# Patient Record
Sex: FEMALE | Race: ASIAN | NOT HISPANIC OR LATINO | Employment: FULL TIME | URBAN - METROPOLITAN AREA
[De-identification: names, ages, dates, MRNs, and addresses within clinical notes are randomized per-mention and may not be internally consistent; named-entity substitution may affect disease eponyms.]

---

## 2020-06-25 ENCOUNTER — OFFICE VISIT (OUTPATIENT)
Dept: OBSTETRICS AND GYNECOLOGY | Facility: CLINIC | Age: 45
End: 2020-06-25
Payer: COMMERCIAL

## 2020-06-25 VITALS
HEIGHT: 61 IN | SYSTOLIC BLOOD PRESSURE: 128 MMHG | WEIGHT: 166 LBS | BODY MASS INDEX: 31.34 KG/M2 | DIASTOLIC BLOOD PRESSURE: 86 MMHG

## 2020-06-25 DIAGNOSIS — Z01.419 ROUTINE GYNECOLOGICAL EXAMINATION: Primary | ICD-10-CM

## 2020-06-25 DIAGNOSIS — E13.9 DIABETES 1.5, MANAGED AS TYPE 2: ICD-10-CM

## 2020-06-25 DIAGNOSIS — I10 HYPERTENSION, UNSPECIFIED TYPE: ICD-10-CM

## 2020-06-25 PROCEDURE — 99386 PREV VISIT NEW AGE 40-64: CPT | Mod: S$GLB,,, | Performed by: OBSTETRICS & GYNECOLOGY

## 2020-06-25 PROCEDURE — 3074F PR MOST RECENT SYSTOLIC BLOOD PRESSURE < 130 MM HG: ICD-10-PCS | Mod: CPTII,S$GLB,, | Performed by: OBSTETRICS & GYNECOLOGY

## 2020-06-25 PROCEDURE — 3074F SYST BP LT 130 MM HG: CPT | Mod: CPTII,S$GLB,, | Performed by: OBSTETRICS & GYNECOLOGY

## 2020-06-25 PROCEDURE — 99386 PR PREVENTIVE VISIT,NEW,40-64: ICD-10-PCS | Mod: S$GLB,,, | Performed by: OBSTETRICS & GYNECOLOGY

## 2020-06-25 PROCEDURE — 3079F PR MOST RECENT DIASTOLIC BLOOD PRESSURE 80-89 MM HG: ICD-10-PCS | Mod: CPTII,S$GLB,, | Performed by: OBSTETRICS & GYNECOLOGY

## 2020-06-25 PROCEDURE — 3079F DIAST BP 80-89 MM HG: CPT | Mod: CPTII,S$GLB,, | Performed by: OBSTETRICS & GYNECOLOGY

## 2020-06-25 RX ORDER — METFORMIN HYDROCHLORIDE 1000 MG/1
TABLET ORAL
COMMUNITY
Start: 2020-06-10

## 2020-06-25 RX ORDER — LOSARTAN POTASSIUM 50 MG/1
TABLET ORAL
COMMUNITY
Start: 2020-06-15

## 2020-06-25 NOTE — PROGRESS NOTES
Subjective:       Patient ID: Reyna Hernandez is a 44 y.o. female.    Chief Complaint:  Well Woman      History of Present Illness  Happy with IUD. My menstruation is light.    Annual Exam-Premenopausal  Patient presents for annual exam. The patient has no complaints today. The patient is sexually active. . The patient wears seatbelts: yes. The patient participates in regular exercise: no. Has the patient ever been transfused or tattooed?: no. The patient reports that there is not domestic violence in her life.    On Losartan, had an episode of hypertensive urgency.      GYN & OB History  Patient's last menstrual period was 2020.   Date of Last Pap: No result found    OB History    Para Term  AB Living   2         2   SAB TAB Ectopic Multiple Live Births                  # Outcome Date GA Lbr Travis/2nd Weight Sex Delivery Anes PTL Lv   2             1                 Review of Systems  Review of Systems   Constitutional: Negative for activity change, appetite change, fatigue, fever and unexpected weight change.   HENT: Negative for nasal congestion and tinnitus.    Eyes: Negative for visual disturbance.   Respiratory: Negative for cough and shortness of breath.    Cardiovascular: Negative for chest pain and leg swelling.   Gastrointestinal: Negative for abdominal pain, bloating, blood in stool, constipation and diarrhea.   Genitourinary: Negative for bladder incontinence, decreased libido, dysmenorrhea, dyspareunia, dysuria, vaginal discharge, vaginal pain, vaginal dryness and vaginal odor.   Musculoskeletal: Negative for arthralgias, back pain and joint swelling.   Integumentary:  Negative for acne.   Neurological: Negative for headaches.   Psychiatric/Behavioral: Negative for depression and sleep disturbance. The patient is not nervous/anxious.            Objective:    Physical Exam:   Constitutional: She is oriented to person, place, and time. She appears well-developed and  well-nourished. She is cooperative.      Neck: No thyroid mass and no thyromegaly present.    Cardiovascular: Normal rate and normal pulses.     Pulmonary/Chest: Effort normal. No respiratory distress. Chest wall is not dull to percussion. She exhibits no mass, no bony tenderness, no laceration, no crepitus, no edema, no deformity, no swelling and no retraction. Right breast exhibits no inverted nipple, no mass, no nipple discharge, no skin change, no tenderness, presence, no bleeding and no swelling. Left breast exhibits no inverted nipple, no mass, no nipple discharge, no skin change, no tenderness, presence, no bleeding and no swelling.        Abdominal: Soft. Normal appearance. She exhibits no distension. There is no abdominal tenderness. No hernia.     Genitourinary:    Vagina, uterus and rectum normal.      Pelvic exam was performed with patient supine.   There is no rash, tenderness, lesion or injury on the right labia. There is no rash, tenderness, lesion or injury on the left labia. Cervix is normal. Right adnexum displays no mass, no tenderness and no fullness. Left adnexum displays no mass, no tenderness and no fullness. No rectocele, cystocele or unspecified prolapse of vaginal walls in the vagina. Labial bartholins normal.          Musculoskeletal: Moves all extremeties.       Neurological: She is alert and oriented to person, place, and time.    Skin: Skin is warm and dry. No rash noted.    Psychiatric: She has a normal mood and affect. Her speech is normal and behavior is normal. Judgment and thought content normal.          Assessment:       Wellness Exam        Plan:    Mammogram  Pap smear  RTC 1 year

## 2020-07-02 ENCOUNTER — TELEPHONE (OUTPATIENT)
Dept: OBSTETRICS AND GYNECOLOGY | Facility: CLINIC | Age: 45
End: 2020-07-02

## 2020-07-02 NOTE — TELEPHONE ENCOUNTER
----- Message from Rosa Taveras NP sent at 7/2/2020 12:48 PM CDT -----  Regarding: Pap results  Please call patient within 24-48 hrs and let them know their pap smear results were normal. Thank you!    -Rosa Taveras NP

## 2020-07-07 ENCOUNTER — TELEPHONE (OUTPATIENT)
Dept: OBSTETRICS AND GYNECOLOGY | Facility: CLINIC | Age: 45
End: 2020-07-07

## 2020-07-07 NOTE — TELEPHONE ENCOUNTER
----- Message from Rosa Taveras NP sent at 7/7/2020  8:44 AM CDT -----  Please call and inform patient recent pap smear testing came back within normal limits.

## 2020-08-13 ENCOUNTER — TELEPHONE (OUTPATIENT)
Dept: OBSTETRICS AND GYNECOLOGY | Facility: CLINIC | Age: 45
End: 2020-08-13

## 2020-08-13 NOTE — TELEPHONE ENCOUNTER
Advised pt mammogram was within normal limits and continue annual screening. Pt verbalized understanding. Bone Density letter was mailed.  AF

## 2020-08-13 NOTE — TELEPHONE ENCOUNTER
----- Message from Rosa Taveras NP sent at 8/13/2020 11:12 AM CDT -----  Please call and inform patient her recent mammogram results were within normal limits and instruct her to continue with annual screening. Be sure breast density letter that was printed is mailed to patient.

## 2021-06-30 ENCOUNTER — OFFICE VISIT (OUTPATIENT)
Dept: OBSTETRICS AND GYNECOLOGY | Facility: CLINIC | Age: 46
End: 2021-06-30
Payer: COMMERCIAL

## 2021-06-30 VITALS
HEIGHT: 61 IN | HEART RATE: 99 BPM | SYSTOLIC BLOOD PRESSURE: 128 MMHG | BODY MASS INDEX: 31.15 KG/M2 | DIASTOLIC BLOOD PRESSURE: 87 MMHG | WEIGHT: 165 LBS

## 2021-06-30 DIAGNOSIS — Z01.419 ROUTINE GYNECOLOGICAL EXAMINATION: Primary | ICD-10-CM

## 2021-06-30 PROCEDURE — 1126F AMNT PAIN NOTED NONE PRSNT: CPT | Mod: S$GLB,,, | Performed by: OBSTETRICS & GYNECOLOGY

## 2021-06-30 PROCEDURE — 99396 PR PREVENTIVE VISIT,EST,40-64: ICD-10-PCS | Mod: S$GLB,,, | Performed by: OBSTETRICS & GYNECOLOGY

## 2021-06-30 PROCEDURE — 3008F PR BODY MASS INDEX (BMI) DOCUMENTED: ICD-10-PCS | Mod: CPTII,S$GLB,, | Performed by: OBSTETRICS & GYNECOLOGY

## 2021-06-30 PROCEDURE — 99396 PREV VISIT EST AGE 40-64: CPT | Mod: S$GLB,,, | Performed by: OBSTETRICS & GYNECOLOGY

## 2021-06-30 PROCEDURE — 3008F BODY MASS INDEX DOCD: CPT | Mod: CPTII,S$GLB,, | Performed by: OBSTETRICS & GYNECOLOGY

## 2021-06-30 PROCEDURE — 1126F PR PAIN SEVERITY QUANTIFIED, NO PAIN PRESENT: ICD-10-PCS | Mod: S$GLB,,, | Performed by: OBSTETRICS & GYNECOLOGY

## 2022-07-05 ENCOUNTER — OFFICE VISIT (OUTPATIENT)
Dept: OBSTETRICS AND GYNECOLOGY | Facility: CLINIC | Age: 47
End: 2022-07-05
Payer: COMMERCIAL

## 2022-07-05 VITALS
SYSTOLIC BLOOD PRESSURE: 134 MMHG | HEIGHT: 61 IN | WEIGHT: 152.69 LBS | DIASTOLIC BLOOD PRESSURE: 83 MMHG | HEART RATE: 83 BPM | BODY MASS INDEX: 28.83 KG/M2

## 2022-07-05 DIAGNOSIS — Z12.31 SCREENING MAMMOGRAM FOR HIGH-RISK PATIENT: Primary | ICD-10-CM

## 2022-07-05 DIAGNOSIS — Z01.419 ROUTINE GYNECOLOGICAL EXAMINATION: ICD-10-CM

## 2022-07-05 PROCEDURE — 99396 PR PREVENTIVE VISIT,EST,40-64: ICD-10-PCS | Mod: S$GLB,,, | Performed by: OBSTETRICS & GYNECOLOGY

## 2022-07-05 PROCEDURE — 4010F ACE/ARB THERAPY RXD/TAKEN: CPT | Mod: CPTII,S$GLB,, | Performed by: OBSTETRICS & GYNECOLOGY

## 2022-07-05 PROCEDURE — 99396 PREV VISIT EST AGE 40-64: CPT | Mod: S$GLB,,, | Performed by: OBSTETRICS & GYNECOLOGY

## 2022-07-05 PROCEDURE — 4010F PR ACE/ARB THEARPY RXD/TAKEN: ICD-10-PCS | Mod: CPTII,S$GLB,, | Performed by: OBSTETRICS & GYNECOLOGY

## 2022-07-05 RX ORDER — ATORVASTATIN CALCIUM 10 MG/1
10 TABLET, FILM COATED ORAL DAILY
COMMUNITY
Start: 2022-05-27

## 2022-07-05 RX ORDER — SEMAGLUTIDE 1.34 MG/ML
INJECTION, SOLUTION SUBCUTANEOUS
COMMUNITY
Start: 2022-06-19

## 2022-07-05 NOTE — PROGRESS NOTES
Subjective:       Patient ID: Reyna Hernandez is a 46 y.o. female.    Chief Complaint:  Well Woman      History of Present Illness  HPI  Annual Exam-Premenopausal  Patient presents for annual exam. The patient has no complaints today.    GYN & OB History  No LMP recorded. (Menstrual status: Birth Control).   Date of Last Pap: No result found    OB History    Para Term  AB Living   2 2       2   SAB IAB Ectopic Multiple Live Births                  # Outcome Date GA Lbr Travis/2nd Weight Sex Delivery Anes PTL Lv   2 Para            1 Para                Review of Systems  Review of Systems   Constitutional: Negative for activity change, appetite change, fatigue, fever and unexpected weight change.   HENT: Negative for nasal congestion and tinnitus.    Eyes: Negative for visual disturbance.   Respiratory: Negative for cough and shortness of breath.    Cardiovascular: Negative for chest pain and leg swelling.   Gastrointestinal: Negative for abdominal pain, bloating, blood in stool, constipation and diarrhea.   Genitourinary: Negative for bladder incontinence, dysuria, vaginal bleeding, vaginal discharge, vaginal pain, vaginal dryness and vaginal odor.   Musculoskeletal: Negative for arthralgias, back pain and joint swelling.   Integumentary:  Negative for acne.   Neurological: Negative for headaches.   Psychiatric/Behavioral: Negative for depression and sleep disturbance. The patient is not nervous/anxious.            Objective:    Physical Exam:   Constitutional: She is oriented to person, place, and time. Vital signs are normal. She appears well-developed and well-nourished. She is cooperative.      Neck: No thyroid mass and no thyromegaly present.    Cardiovascular: Normal rate, regular rhythm and normal pulses.     Pulmonary/Chest: Effort normal. No respiratory distress. Chest wall is not dull to percussion. She exhibits no mass, no bony tenderness, no laceration, no crepitus, no edema, no deformity, no  swelling and no retraction. Right breast exhibits no inverted nipple, no mass, no nipple discharge, no skin change, no tenderness, presence, no bleeding and no swelling. Left breast exhibits no inverted nipple, no mass, no nipple discharge, no skin change, no tenderness, presence, no bleeding and no swelling.        Abdominal: Soft. She exhibits no distension. There is no abdominal tenderness. No hernia.     Genitourinary:    Vagina, uterus and rectum normal.      Pelvic exam was performed with patient supine.   Labial bartholins normal.There is no rash, tenderness, lesion or injury on the right labia. There is no rash, tenderness, lesion or injury on the left labia. Cervix is normal. Right adnexum displays no mass, no tenderness and no fullness. Left adnexum displays no mass, no tenderness and no fullness. No  no vaginal discharge, rectocele, cystocele or unspecified prolapse of vaginal walls in the vagina.           Musculoskeletal: Moves all extremeties.       Neurological: She is alert and oriented to person, place, and time.    Skin: Skin is warm and dry. No rash noted.    Psychiatric: She has a normal mood and affect. Her speech is normal and behavior is normal. Judgment and thought content normal.          Assessment:        1. Screening mammogram for high-risk patient    Well Woman Exam         Plan:      Routine care  Patient starting Ozempic for diabetes.

## 2022-08-17 ENCOUNTER — TELEPHONE (OUTPATIENT)
Dept: OBSTETRICS AND GYNECOLOGY | Facility: CLINIC | Age: 47
End: 2022-08-17
Payer: COMMERCIAL

## 2022-08-17 ENCOUNTER — PATIENT MESSAGE (OUTPATIENT)
Dept: OBSTETRICS AND GYNECOLOGY | Facility: CLINIC | Age: 47
End: 2022-08-17
Payer: COMMERCIAL

## 2022-08-17 DIAGNOSIS — N63.0 BREAST LUMP: Primary | ICD-10-CM

## 2022-08-17 NOTE — TELEPHONE ENCOUNTER
Called to inform patient that additional views are being requested by the radiologist, and advised patient that the provider has sent the order over. Patient understood         kt

## 2022-08-17 NOTE — TELEPHONE ENCOUNTER
----- Message from Shira Pollock MD sent at 8/17/2022  2:09 PM CDT -----  Please notify patient of the need for additional imaging in order as well.

## 2022-08-24 ENCOUNTER — TELEPHONE (OUTPATIENT)
Dept: OBSTETRICS AND GYNECOLOGY | Facility: CLINIC | Age: 47
End: 2022-08-24
Payer: COMMERCIAL

## 2022-08-24 DIAGNOSIS — N63.0 BREAST LUMP: Primary | ICD-10-CM

## 2022-08-29 ENCOUNTER — OFFICE VISIT (OUTPATIENT)
Dept: SURGERY | Facility: CLINIC | Age: 47
End: 2022-08-29
Payer: COMMERCIAL

## 2022-08-29 DIAGNOSIS — N63.20 MASS OF BREAST, LEFT: Primary | ICD-10-CM

## 2022-08-29 DIAGNOSIS — N63.0 BREAST LUMP: ICD-10-CM

## 2022-08-29 PROCEDURE — 1159F PR MEDICATION LIST DOCUMENTED IN MEDICAL RECORD: ICD-10-PCS | Mod: CPTII,S$GLB,, | Performed by: SURGERY

## 2022-08-29 PROCEDURE — 1160F PR REVIEW ALL MEDS BY PRESCRIBER/CLIN PHARMACIST DOCUMENTED: ICD-10-PCS | Mod: CPTII,S$GLB,, | Performed by: SURGERY

## 2022-08-29 PROCEDURE — 99203 PR OFFICE/OUTPT VISIT, NEW, LEVL III, 30-44 MIN: ICD-10-PCS | Mod: S$GLB,,, | Performed by: SURGERY

## 2022-08-29 PROCEDURE — 4010F PR ACE/ARB THEARPY RXD/TAKEN: ICD-10-PCS | Mod: CPTII,S$GLB,, | Performed by: SURGERY

## 2022-08-29 PROCEDURE — 1159F MED LIST DOCD IN RCRD: CPT | Mod: CPTII,S$GLB,, | Performed by: SURGERY

## 2022-08-29 PROCEDURE — 4010F ACE/ARB THERAPY RXD/TAKEN: CPT | Mod: CPTII,S$GLB,, | Performed by: SURGERY

## 2022-08-29 PROCEDURE — 99203 OFFICE O/P NEW LOW 30 MIN: CPT | Mod: S$GLB,,, | Performed by: SURGERY

## 2022-08-29 PROCEDURE — 1160F RVW MEDS BY RX/DR IN RCRD: CPT | Mod: CPTII,S$GLB,, | Performed by: SURGERY

## 2022-08-29 NOTE — PROGRESS NOTES
History & Physical    SUBJECTIVE:     History of Present Illness:    46-year-old female referred by Dr. Shira Pollock for an abnormal screening mammogram and ultrasound.  Patient noted to have 2 lesions in the left breast, the 1st 9 mm left upper outer quadrant with irregular margins at 3:00 a.m..  Ultrasound also identified a 10 mm lesion at the 11 o'clock position.  This was classified as BI-RADS category 4 suspicious findings.  Patient does not take hormonal therapy.  No family history of breast cancer.  Patient relates no suspicious masses or changes in either breast    Chief Complaint   Patient presents with    Breast Problem     Left breast          Review of patient's allergies indicates:  Review of patient's allergies indicates:  No Known Allergies    Current Outpatient Medications on File Prior to Visit   Medication Sig Dispense Refill    atorvastatin (LIPITOR) 10 MG tablet Take 10 mg by mouth once daily.      losartan (COZAAR) 50 MG tablet       metFORMIN (GLUCOPHAGE) 1000 MG tablet       OZEMPIC 0.25 mg or 0.5 mg(2 mg/1.5 mL) pen injector INJECT 0.5 MG UNDER THE SKIN ONCE A WEEK       No current facility-administered medications on file prior to visit.       Past Medical History:   Diagnosis Date    Depression     Diabetes mellitus     DM (diabetes mellitus), type 2     Elderly multigravida     Hypertension      Past Surgical History:   Procedure Laterality Date    LEFT OOPHORECTOMY Left 2012    left ovary removed     Family History   Problem Relation Age of Onset    Hypertension Mother     Diabetes Mother     Hyperlipidemia Mother     Hyperthyroidism Mother     Stroke Maternal Uncle     No Known Problems Father     No Known Problems Sister     No Known Problems Brother     No Known Problems Maternal Grandmother     No Known Problems Maternal Grandfather     No Known Problems Paternal Grandmother     No Known Problems Paternal Grandfather        Social History     Socioeconomic History    Marital status:     Tobacco Use    Smoking status: Never    Smokeless tobacco: Never   Substance and Sexual Activity    Alcohol use: Not Currently    Drug use: Never    Sexual activity: Yes     Partners: Male     Birth control/protection: None     Comment: history of IUD          Review of Systems   Constitutional: Negative.    Respiratory: Negative.     Cardiovascular: Negative.    Gastrointestinal: Negative.    Genitourinary: Negative.    Musculoskeletal: Negative.    Endo/Heme/Allergies: Negative.    Psychiatric/Behavioral: Negative.       OBJECTIVE:     There were no vitals filed for this visit.              Physical Exam:  Physical Exam  Constitutional:       Appearance: She is normal weight.   HENT:      Head: Normocephalic.   Cardiovascular:      Rate and Rhythm: Regular rhythm.      Heart sounds: Normal heart sounds.   Pulmonary:      Effort: Pulmonary effort is normal.   Chest:      Comments: Bilateral breast examination performed.  In the right breast there are no palpable masses or noted skin changes.  Nipple areola complex normal.  Axilla negative.  Left breast shows no abnormal masses palpable.  Skin of the breast is normal.  Nipple areola complex normal.  Axilla negative  Abdominal:      General: Abdomen is flat. Bowel sounds are normal.      Palpations: Abdomen is soft.   Musculoskeletal:         General: Normal range of motion.      Cervical back: Normal range of motion.   Skin:     General: Skin is warm and dry.      Coloration: Skin is not jaundiced.   Neurological:      General: No focal deficit present.      Mental Status: She is alert and oriented to person, place, and time.   Psychiatric:         Mood and Affect: Mood normal.         Behavior: Behavior normal.           ASSESSMENT/PLAN:   BI-RADS category 4 suspicious findings left breast x2.  9 mm 3 o'clock position and 10 mm 11 o'clock position  PLAN:  Recommend ultrasound-guided biopsies of both the left breast lesions noted on the ultrasound.   Further treatment pending pathological diagnosis.

## 2022-09-19 DIAGNOSIS — C50.912 MALIGNANT NEOPLASM OF LEFT FEMALE BREAST, UNSPECIFIED ESTROGEN RECEPTOR STATUS, UNSPECIFIED SITE OF BREAST: Primary | ICD-10-CM

## 2022-09-20 ENCOUNTER — TELEPHONE (OUTPATIENT)
Dept: OBSTETRICS AND GYNECOLOGY | Facility: CLINIC | Age: 47
End: 2022-09-20
Payer: COMMERCIAL

## 2022-09-20 LAB — SPECIMEN TO PATHOLOGY: NORMAL

## 2022-09-20 NOTE — TELEPHONE ENCOUNTER
----- Message from Mira Herron sent at 9/20/2022  8:12 AM CDT -----  Contact: Patient  Patient need the nurse to call her regarding her procedure       #  [phone]

## 2022-09-20 NOTE — TELEPHONE ENCOUNTER
Patient's call was returned, she is inquiring about the status of the referral to Dr. Huerta, gave the patient their office contact information to speak with them regarding status.         Xochitl

## 2022-09-21 ENCOUNTER — TELEPHONE (OUTPATIENT)
Dept: OBSTETRICS AND GYNECOLOGY | Facility: CLINIC | Age: 47
End: 2022-09-21
Payer: COMMERCIAL

## 2022-09-21 NOTE — TELEPHONE ENCOUNTER
Patient with concern regards to follow-up with General surgery.  Feels that she be sooner.  Discussed with patient the next Monday should be okay.  Case discussed with Dr. Huerta and he will advise her at that time.

## 2022-09-23 LAB — SPECIMEN TO PATHOLOGY: NORMAL

## 2022-09-26 ENCOUNTER — OFFICE VISIT (OUTPATIENT)
Dept: SURGERY | Facility: CLINIC | Age: 47
End: 2022-09-26
Payer: COMMERCIAL

## 2022-09-26 DIAGNOSIS — C50.912 MALIGNANT NEOPLASM OF LEFT FEMALE BREAST, UNSPECIFIED ESTROGEN RECEPTOR STATUS, UNSPECIFIED SITE OF BREAST: Primary | ICD-10-CM

## 2022-09-26 LAB
ANION GAP SERPL CALC-SCNC: 10 MMOL/L (ref 3–11)
BASOPHILS NFR BLD: 0.3 % (ref 0–3)
BUN SERPL-MCNC: 8 MG/DL (ref 7–18)
BUN/CREAT SERPL: 12.69 RATIO (ref 7–18)
CALCIUM SERPL-MCNC: 9.1 MG/DL (ref 8.8–10.5)
CHLORIDE SERPL-SCNC: 105 MMOL/L (ref 100–108)
CO2 SERPL-SCNC: 25 MMOL/L (ref 21–32)
CREAT SERPL-MCNC: 0.63 MG/DL (ref 0.55–1.02)
EOSINOPHIL NFR BLD: 0.9 % (ref 1–3)
ERYTHROCYTE [DISTWIDTH] IN BLOOD BY AUTOMATED COUNT: 13.1 % (ref 12.5–18)
GFR ESTIMATION: > 60
GLUCOSE SERPL-MCNC: 91 MG/DL (ref 70–110)
HCG QUALITATIVE: NEGATIVE
HCT VFR BLD AUTO: 39.6 % (ref 37–47)
HGB BLD-MCNC: 13.7 G/DL (ref 12–16)
LYMPHOCYTES NFR BLD: 25.5 % (ref 25–40)
MCH RBC QN AUTO: 29.2 PG (ref 27–31.2)
MCHC RBC AUTO-ENTMCNC: 34.6 G/DL (ref 31.8–35.4)
MCV RBC AUTO: 84.4 FL (ref 80–97)
MONOCYTES NFR BLD: 7 % (ref 1–15)
NEUTROPHILS # BLD AUTO: 6.15 10*3/UL (ref 1.8–7.7)
NEUTROPHILS NFR BLD: 65.9 % (ref 37–80)
NUCLEATED RED BLOOD CELLS: 0 %
PLATELETS: 357 10*3/UL (ref 142–424)
POTASSIUM SERPL-SCNC: 3.9 MMOL/L (ref 3.6–5.2)
RBC # BLD AUTO: 4.69 10*6/UL (ref 4.2–5.4)
SODIUM BLD-SCNC: 140 MMOL/L (ref 135–145)
WBC # BLD: 9.3 10*3/UL (ref 4.6–10.2)

## 2022-09-26 PROCEDURE — 1160F RVW MEDS BY RX/DR IN RCRD: CPT | Mod: CPTII,S$GLB,, | Performed by: SURGERY

## 2022-09-26 PROCEDURE — 4010F PR ACE/ARB THEARPY RXD/TAKEN: ICD-10-PCS | Mod: CPTII,S$GLB,, | Performed by: SURGERY

## 2022-09-26 PROCEDURE — 1160F PR REVIEW ALL MEDS BY PRESCRIBER/CLIN PHARMACIST DOCUMENTED: ICD-10-PCS | Mod: CPTII,S$GLB,, | Performed by: SURGERY

## 2022-09-26 PROCEDURE — 99213 OFFICE O/P EST LOW 20 MIN: CPT | Mod: S$GLB,,, | Performed by: SURGERY

## 2022-09-26 PROCEDURE — 1159F PR MEDICATION LIST DOCUMENTED IN MEDICAL RECORD: ICD-10-PCS | Mod: CPTII,S$GLB,, | Performed by: SURGERY

## 2022-09-26 PROCEDURE — 4010F ACE/ARB THERAPY RXD/TAKEN: CPT | Mod: CPTII,S$GLB,, | Performed by: SURGERY

## 2022-09-26 PROCEDURE — 99213 PR OFFICE/OUTPT VISIT, EST, LEVL III, 20-29 MIN: ICD-10-PCS | Mod: S$GLB,,, | Performed by: SURGERY

## 2022-09-26 PROCEDURE — 1159F MED LIST DOCD IN RCRD: CPT | Mod: CPTII,S$GLB,, | Performed by: SURGERY

## 2022-09-26 NOTE — PROGRESS NOTES
History & Physical    SUBJECTIVE:     History of Present Illness:    [age] year old female returns today after recent ultrasound-guided left breast biopsy of a suspicious lesion 11:00 a.m. left breast showed to be invasive ductal carcinoma, ERPR negative, HER2 negative.  The lesion size was 10 mm on previous ultrasound.  The left axilla was negative on ultrasound.  She is here today to discuss therapy.    Chief Complaint   Patient presents with    Breast Problem     Left breast         Review of patient's allergies indicates:  Review of patient's allergies indicates:  No Known Allergies    Current Outpatient Medications on File Prior to Visit   Medication Sig Dispense Refill    atorvastatin (LIPITOR) 10 MG tablet Take 10 mg by mouth once daily.      losartan (COZAAR) 50 MG tablet       metFORMIN (GLUCOPHAGE) 1000 MG tablet       OZEMPIC 0.25 mg or 0.5 mg(2 mg/1.5 mL) pen injector INJECT 0.5 MG UNDER THE SKIN ONCE A WEEK       No current facility-administered medications on file prior to visit.       Past Medical History:   Diagnosis Date    Depression     Diabetes mellitus     DM (diabetes mellitus), type 2     Elderly multigravida     Hypertension      Past Surgical History:   Procedure Laterality Date    LEFT OOPHORECTOMY Left 2012    left ovary removed     Family History   Problem Relation Age of Onset    Hypertension Mother     Diabetes Mother     Hyperlipidemia Mother     Hyperthyroidism Mother     Stroke Maternal Uncle     No Known Problems Father     No Known Problems Sister     No Known Problems Brother     No Known Problems Maternal Grandmother     No Known Problems Maternal Grandfather     No Known Problems Paternal Grandmother     No Known Problems Paternal Grandfather        Social History     Socioeconomic History    Marital status:    Tobacco Use    Smoking status: Never    Smokeless tobacco: Never   Substance and Sexual Activity    Alcohol use: Not Currently    Drug use: Never    Sexual  activity: Yes     Partners: Male     Birth control/protection: None     Comment: history of IUD          Review of Systems   Constitutional: Negative.    Respiratory: Negative.     Cardiovascular: Negative.    Gastrointestinal: Negative.    Genitourinary: Negative.    Musculoskeletal: Negative.    Neurological: Negative.    Endo/Heme/Allergies: Negative.    Psychiatric/Behavioral: Negative.       OBJECTIVE:     There were no vitals filed for this visit.              Physical Exam:  Physical Exam  Constitutional:       Appearance: Normal appearance. She is normal weight.   HENT:      Head: Normocephalic.   Eyes:      Pupils: Pupils are equal, round, and reactive to light.   Cardiovascular:      Rate and Rhythm: Normal rate and regular rhythm.   Pulmonary:      Effort: Pulmonary effort is normal.      Breath sounds: Normal breath sounds.   Chest:          Comments: The left breast shows the area of the biopsy as marked above.  No masses palpable in the left breast at the site of the lesion.  Overlying skin normal.  Axilla negative  Abdominal:      General: Abdomen is flat. Bowel sounds are normal.      Palpations: Abdomen is soft.   Musculoskeletal:         General: No swelling. Normal range of motion.      Cervical back: Normal range of motion.   Skin:     General: Skin is warm and dry.      Coloration: Skin is not jaundiced.   Neurological:      General: No focal deficit present.      Mental Status: She is alert and oriented to person, place, and time.      Cranial Nerves: No cranial nerve deficit.           ASSESSMENT/PLAN:   Triple negative left breast invasive ductal carcinoma, 10 mm 11:00 a.m. left breast  PLAN:  Recommend left partial mastectomy seed localization with left axillary sentinel lymph node biopsy.  Surgery scheduled for next week.  Procedure, risk and benefits discussed with patient and all questions answered.  Referral to Medical and Radiation Oncology postoperatively.

## 2022-10-04 ENCOUNTER — OUTSIDE PLACE OF SERVICE (OUTPATIENT)
Dept: ADMINISTRATIVE | Facility: OTHER | Age: 47
End: 2022-10-04
Payer: COMMERCIAL

## 2022-10-04 LAB
GLUCOSE SERPL-MCNC: 92 MG/DL (ref 70–105)
GLUCOSE SERPL-MCNC: 96 MG/DL (ref 70–105)

## 2022-10-04 PROCEDURE — 38900 PR INTRAOPERATIVE SENTINEL LYMPH NODE ID W DYE INJECTION: ICD-10-PCS | Mod: LT,,, | Performed by: SURGERY

## 2022-10-04 PROCEDURE — 38900 IO MAP OF SENT LYMPH NODE: CPT | Mod: LT,,, | Performed by: SURGERY

## 2022-10-04 PROCEDURE — 19301 PR MASTECTOMY, PARTIAL: ICD-10-PCS | Mod: LT,,, | Performed by: SURGERY

## 2022-10-04 PROCEDURE — 38525 BIOPSY/REMOVAL LYMPH NODES: CPT | Mod: 51,LT,, | Performed by: SURGERY

## 2022-10-04 PROCEDURE — 19301 PARTIAL MASTECTOMY: CPT | Mod: LT,,, | Performed by: SURGERY

## 2022-10-04 PROCEDURE — 38525 PR BIOPSY/REM LYMPH NODES, AXILLARY: ICD-10-PCS | Mod: 51,LT,, | Performed by: SURGERY

## 2022-10-12 ENCOUNTER — OFFICE VISIT (OUTPATIENT)
Dept: SURGERY | Facility: CLINIC | Age: 47
End: 2022-10-12
Payer: COMMERCIAL

## 2022-10-12 ENCOUNTER — OFFICE VISIT (OUTPATIENT)
Dept: HEMATOLOGY/ONCOLOGY | Facility: CLINIC | Age: 47
End: 2022-10-12
Payer: COMMERCIAL

## 2022-10-12 ENCOUNTER — TELEPHONE (OUTPATIENT)
Dept: HEMATOLOGY/ONCOLOGY | Facility: CLINIC | Age: 47
End: 2022-10-12

## 2022-10-12 VITALS
DIASTOLIC BLOOD PRESSURE: 85 MMHG | BODY MASS INDEX: 29.07 KG/M2 | OXYGEN SATURATION: 98 % | HEIGHT: 61 IN | WEIGHT: 154 LBS | HEART RATE: 94 BPM | RESPIRATION RATE: 15 BRPM | SYSTOLIC BLOOD PRESSURE: 145 MMHG

## 2022-10-12 DIAGNOSIS — Z17.0 MALIGNANT NEOPLASM OF UPPER-INNER QUADRANT OF LEFT BREAST IN FEMALE, ESTROGEN RECEPTOR POSITIVE: ICD-10-CM

## 2022-10-12 DIAGNOSIS — Z98.890 POST-OPERATIVE STATE: ICD-10-CM

## 2022-10-12 DIAGNOSIS — C50.212 MALIGNANT NEOPLASM OF UPPER-INNER QUADRANT OF LEFT BREAST IN FEMALE, ESTROGEN RECEPTOR POSITIVE: ICD-10-CM

## 2022-10-12 DIAGNOSIS — Z17.1 MALIGNANT NEOPLASM OF LEFT BREAST IN FEMALE, ESTROGEN RECEPTOR NEGATIVE, UNSPECIFIED SITE OF BREAST: Primary | ICD-10-CM

## 2022-10-12 DIAGNOSIS — C50.912 MALIGNANT NEOPLASM OF LEFT BREAST IN FEMALE, ESTROGEN RECEPTOR NEGATIVE, UNSPECIFIED SITE OF BREAST: Primary | ICD-10-CM

## 2022-10-12 DIAGNOSIS — Z98.890 POST-OPERATIVE STATE: Primary | ICD-10-CM

## 2022-10-12 PROCEDURE — 1159F MED LIST DOCD IN RCRD: CPT | Mod: CPTII,S$GLB,, | Performed by: SURGERY

## 2022-10-12 PROCEDURE — 1160F RVW MEDS BY RX/DR IN RCRD: CPT | Mod: CPTII,S$GLB,, | Performed by: SURGERY

## 2022-10-12 PROCEDURE — 99024 PR POST-OP FOLLOW-UP VISIT: ICD-10-PCS | Mod: S$GLB,,, | Performed by: SURGERY

## 2022-10-12 PROCEDURE — 4010F PR ACE/ARB THEARPY RXD/TAKEN: ICD-10-PCS | Mod: CPTII,S$GLB,, | Performed by: SURGERY

## 2022-10-12 PROCEDURE — 99205 OFFICE O/P NEW HI 60 MIN: CPT | Mod: S$GLB,,, | Performed by: INTERNAL MEDICINE

## 2022-10-12 PROCEDURE — 3077F PR MOST RECENT SYSTOLIC BLOOD PRESSURE >= 140 MM HG: ICD-10-PCS | Mod: CPTII,S$GLB,, | Performed by: INTERNAL MEDICINE

## 2022-10-12 PROCEDURE — 3079F PR MOST RECENT DIASTOLIC BLOOD PRESSURE 80-89 MM HG: ICD-10-PCS | Mod: CPTII,S$GLB,, | Performed by: INTERNAL MEDICINE

## 2022-10-12 PROCEDURE — 99024 POSTOP FOLLOW-UP VISIT: CPT | Mod: S$GLB,,, | Performed by: SURGERY

## 2022-10-12 PROCEDURE — 4010F PR ACE/ARB THEARPY RXD/TAKEN: ICD-10-PCS | Mod: CPTII,S$GLB,, | Performed by: INTERNAL MEDICINE

## 2022-10-12 PROCEDURE — 1159F PR MEDICATION LIST DOCUMENTED IN MEDICAL RECORD: ICD-10-PCS | Mod: CPTII,S$GLB,, | Performed by: INTERNAL MEDICINE

## 2022-10-12 PROCEDURE — 4010F ACE/ARB THERAPY RXD/TAKEN: CPT | Mod: CPTII,S$GLB,, | Performed by: INTERNAL MEDICINE

## 2022-10-12 PROCEDURE — 1159F MED LIST DOCD IN RCRD: CPT | Mod: CPTII,S$GLB,, | Performed by: INTERNAL MEDICINE

## 2022-10-12 PROCEDURE — 3079F DIAST BP 80-89 MM HG: CPT | Mod: CPTII,S$GLB,, | Performed by: INTERNAL MEDICINE

## 2022-10-12 PROCEDURE — 1160F PR REVIEW ALL MEDS BY PRESCRIBER/CLIN PHARMACIST DOCUMENTED: ICD-10-PCS | Mod: CPTII,S$GLB,, | Performed by: SURGERY

## 2022-10-12 PROCEDURE — 4010F ACE/ARB THERAPY RXD/TAKEN: CPT | Mod: CPTII,S$GLB,, | Performed by: SURGERY

## 2022-10-12 PROCEDURE — 3077F SYST BP >= 140 MM HG: CPT | Mod: CPTII,S$GLB,, | Performed by: INTERNAL MEDICINE

## 2022-10-12 PROCEDURE — 99205 PR OFFICE/OUTPT VISIT, NEW, LEVL V, 60-74 MIN: ICD-10-PCS | Mod: S$GLB,,, | Performed by: INTERNAL MEDICINE

## 2022-10-12 PROCEDURE — 1159F PR MEDICATION LIST DOCUMENTED IN MEDICAL RECORD: ICD-10-PCS | Mod: CPTII,S$GLB,, | Performed by: SURGERY

## 2022-10-12 NOTE — PROGRESS NOTES
HPI:  Postop revisit status post left partial mastectomy and sentinel lymph node biopsy for triple negative invasive ductal breast cancer.  Tumor size was 13 mm and margins were clear.  Dutton lymph node was negative for metastatic carcinoma.  She is doing well with no complaints.    PHYSICAL EXAM:  Left breast examination shows healing left axillary and left breast incisions.  Subcuticular sutures are removed.  No redness or drainage noted.  ASSESSMENT:    Stable status post left partial mastectomy with sentinel lymph node biopsy for triple negative T1 invasive ductal breast cancer  PLAN:      Patient would like to return to work and I am fine with that but we will restrict her from heavy lifting and encouraged her to lift with her right arm at least for another week.  We will also refer her to both Medical and Radiation Oncology for consultation.  Patient has also decided that she would like to go ahead with genetic testing that we had talked about preoperatively.  This will be done today in the office.

## 2022-10-12 NOTE — PROGRESS NOTES
MEDICAL ONCOLOGY INITIAL CONSULTATION NOTE    Patient ID: Tequila Hernandez is a 46 y.o. female.    Chief Complaint:  Breast cancer    HPI:  Patient is a 46-year-old female with past medical history of diabetes mellitus, hypertension with recent diagnosis of triple negative breast cancer for which she underwent left breast partial mastectomy and sentinel lymph node biopsy.  Patient reports recovering well from recent surgery and presents to our clinic today for further evaluation.  Voices no acute complaints.  She also has undergone genetic testing with surgery does of her young age and triple negative status.      Pathology:     10/7/22:  1. LEFT AXILLARY SENTINEL LYMPH NODE, EXCISIONAL BIOPSY:   - BENIGN REACTIVE SENTINEL LYMPH NODE EXAMINED, AND IT IS NEGATIVE FOR   METASTATIC CARCINOMA (0/1).   2. LEFT BREAST, LEFT SIMPLE MASTECTOMY:   - RESIDUAL INFILTRATING DUCTAL CARCINOMA, BOO GRADE 3, SIZE 13 MM, ASSOCIATED WITH PRIOR    BIOPSY SCAR/SEED MARKER, MARGINS UNINVOLVED IN THIS SPECIMEN, SEE TUMOR SUMMARY.   - NO EVIDENCE OF DCIS.   - NONPROLIFERATIVE FIBROCYSTIC CHANGES AND PSEUDOANGIOMATOUS   HYPERPLASIA(PASH) PRESENT      COMPRISING A GROSSLY EVIDENT 8 MM NODULE AS WELL INCIDENTAL SMALL   FIBROADENOMA..   3. BREAST, ADDITIONAL SUPERIOR MARGIN, MARGIN EXCISION:   - BENIGN MAMMARY TISSUE AND ADIPOSE TISSUE, NEGATIVE FOR TUMOR, MARGIN   CLEAR.   4. LEFT BREAST, ADDITIONAL MEDIAL MARGIN, MARGIN EXCISION:   - BENIGN MAMMARY TISSUE AND ADIPOSE TISSUE, NEGATIVE FOR TUMOR, MARGIN   CLEAR.   LEFT  BREAST TUMOR SUMMARY(INCORPORATES PARTS 1 TO 4):   SPECIMEN TYPE AND PROCEDURE:      Simple mastectomy with additional margin   excisions and sentinel node biopsy.   SPECIMEN LATERALITY:         Left.   TUMOR SITE (QUADRANT):         Not specified.   SPECIMEN INTEGRITY:         Intact    HISTOLOGIC TYPE:         Infiltrating ductal carcinoma.   COMBINED HISTOLOGIC GRADE:      Boo Grade 3   BOO HISTOLOGIC  "SCORE:      3,3,,3= total score 9 of 9    SIZE OF INVASIVE COMPONENT:      13 mm   TUMOR FOCALITY:         Unifocal.   TUMOR NECROSIS:         Not identified.   LYMPHOVASCULAR INVASION:      Not identified.   PERINEURAL INVASION:         Not identified   SKIN:               Not applicable (Skin is present and uninvolved.)   NIPPLE:               Not applicable (Nipple is not present.)   SIZE/TYPE/EXTENT INTRADUCTAL CANCER:  Not identified.        EXTENSIVE INTRADUCTAL COMPONENT (EIC): Not applicable (No DCIS).        NECROSIS:            Not applicable.   MICROCALCIFICATION:         Not identified   SURGICAL MARGINS:             INVASIVE:      Negative                IN SITU:      Not applicable.   DISTANCE TO CLOSEST MARGIN:      Invasive carcinoma extends to 3.5 mm of   superior margin.   LYMPH NODES:                 # TOTAL LYMPH NODES EXAMINED:   1  (1 sentinel in part 1)                 # SENTINEL NODES EXAMINED:   1                 # NODES WITH MACROMETASTASIS:   0                 # NODES WITH MICROMETASTASIS:   0    METASTASIS:            Not applicable.   TREATMENT EFFECT:         No known prior therapy.   TNM CODE:            pT1c  pN0(sn)  M-n/a  G3   ANCILLARY STUDIES: BREAST PROGNOSTIC PANEL: Per prior biopsy [id number], the   tumor is ER-, VA-, HER2 negative ("Triple Negative").   ___________________________________________________________________________    Imaging:     Past Medical History:   Diagnosis Date    Depression     Diabetes mellitus     DM (diabetes mellitus), type 2     Elderly multigravida     Hypertension      Family History   Problem Relation Age of Onset    Hypertension Mother     Diabetes Mother     Hyperlipidemia Mother     Hyperthyroidism Mother     Stroke Maternal Uncle     No Known Problems Father     No Known Problems Sister     No Known Problems Brother     No Known Problems Maternal Grandmother     No Known Problems Maternal Grandfather     No Known Problems Paternal Grandmother  "    No Known Problems Paternal Grandfather      Social History     Socioeconomic History    Marital status:    Tobacco Use    Smoking status: Never    Smokeless tobacco: Never   Substance and Sexual Activity    Alcohol use: Not Currently    Drug use: Never    Sexual activity: Yes     Partners: Male     Birth control/protection: None     Comment: history of IUD     Past Surgical History:   Procedure Laterality Date    LEFT OOPHORECTOMY Left 2012    left ovary removed         Review of systems:  Review of Systems   Constitutional:  Negative for activity change, appetite change, chills, diaphoresis, fatigue and unexpected weight change.   HENT:  Negative for congestion, facial swelling, hearing loss, mouth sores, trouble swallowing and voice change.    Eyes:  Negative for photophobia, pain, discharge and itching.   Respiratory:  Negative for apnea, cough, choking, chest tightness and shortness of breath.    Cardiovascular:  Negative for chest pain, palpitations and leg swelling.   Gastrointestinal:  Negative for abdominal distention, abdominal pain, anal bleeding and blood in stool.   Endocrine: Negative for cold intolerance, heat intolerance, polydipsia and polyphagia.   Genitourinary:  Negative for difficulty urinating, dysuria, flank pain and hematuria.   Musculoskeletal:  Negative for arthralgias, back pain, joint swelling, myalgias, neck pain and neck stiffness.        Positive for breast mass. See HPI   Skin:  Negative for color change, pallor and wound.   Allergic/Immunologic: Negative for environmental allergies, food allergies and immunocompromised state.   Neurological:  Negative for dizziness, seizures, facial asymmetry, speech difficulty, light-headedness, numbness and headaches.   Hematological:  Negative for adenopathy. Does not bruise/bleed easily.   Psychiatric/Behavioral:  Negative for agitation, behavioral problems, confusion, decreased concentration and sleep disturbance.        Physical  Exam  Vitals and nursing note reviewed.   Constitutional:       General: She is not in acute distress.     Appearance: Normal appearance. She is not ill-appearing.   HENT:      Head: Normocephalic and atraumatic.      Nose: No congestion or rhinorrhea.   Eyes:      General: No scleral icterus.     Extraocular Movements: Extraocular movements intact.      Pupils: Pupils are equal, round, and reactive to light.   Cardiovascular:      Rate and Rhythm: Normal rate and regular rhythm.      Pulses: Normal pulses.      Heart sounds: Normal heart sounds. No murmur heard.    No gallop.   Pulmonary:      Effort: Pulmonary effort is normal. No respiratory distress.      Breath sounds: Normal breath sounds. No stridor. No wheezing or rhonchi.   Abdominal:      General: Bowel sounds are normal. There is no distension.      Palpations: There is no mass.      Tenderness: There is no abdominal tenderness. There is no guarding.   Musculoskeletal:         General: No swelling, tenderness, deformity or signs of injury. Normal range of motion.      Cervical back: Normal range of motion and neck supple. No rigidity. No muscular tenderness.      Right lower leg: No edema.      Left lower leg: No edema.   Skin:     General: Skin is warm.      Coloration: Skin is not jaundiced or pale.      Findings: No bruising or lesion.      Comments: Status post left breast lumpectomy.  Healing well   Neurological:      General: No focal deficit present.      Mental Status: She is alert and oriented to person, place, and time.      Cranial Nerves: No cranial nerve deficit.      Sensory: No sensory deficit.      Motor: No weakness.      Gait: Gait normal.   Psychiatric:         Mood and Affect: Mood normal.         Behavior: Behavior normal.         Thought Content: Thought content normal.                       Vitals:    10/12/22 1309   BP: (!) 145/85   Pulse: 94   Resp: 15   Body surface area is 1.73 meters squared.    Assessment/Plan:      ECOG  1    Infiltrating ductal carcinoma arising from the left breast in female:  == Triple negative.  Status post left breast lumpectomy and sentinel lymph node evaluation  ==  pT1c  pN0(sn)  M-n/a  G3.  Size of invasive component is more than 1 cm (13 mm).  Based on her triple negative status and young age my recommendation would be for adjuvant chemotherapy with dose dense AC x4 followed by weekly paclitaxel for 12 cycles.  Post lumpectomy and after completion of chemotherapy patient will be followed by radiation oncology for evaluation of radiation treatment.   == Patient will also need genetic testing and based on documentation from surgery she is agreeable to that.  == will obtain prior authorization request and obtain echocardiogram prior.  Patient will also need port placement and referral has been made in this regard     Plan:    Return to clinic in 1-2 weeks for chemo education and to start treatment  Port placement  2D echocardiogram        Total time spent in counseling and discussion about further management options including relevant lab work, treatment,  prognosis, medications and intended side effects was more than 60 minutes. More than 50% of the time was spent on counseling and coordination of care.  I spent a total of 60 minutes on the day of the visit.This includes face to face time and non-face to face time preparing to see the patient (eg, review of tests), Obtaining and/or reviewing separately obtained history, Documenting clinical information in the electronic or other health record, Independently interpreting resultsand communicating results to the patient/family/caregiver, or Care coordination.

## 2022-10-14 PROBLEM — Z17.0 MALIGNANT NEOPLASM OF UPPER-INNER QUADRANT OF LEFT BREAST IN FEMALE, ESTROGEN RECEPTOR POSITIVE: Status: ACTIVE | Noted: 2022-10-14

## 2022-10-14 PROBLEM — C50.212 MALIGNANT NEOPLASM OF UPPER-INNER QUADRANT OF LEFT BREAST IN FEMALE, ESTROGEN RECEPTOR POSITIVE: Status: ACTIVE | Noted: 2022-10-14

## 2022-10-18 DIAGNOSIS — Z17.1 MALIGNANT NEOPLASM OF LEFT BREAST IN FEMALE, ESTROGEN RECEPTOR NEGATIVE, UNSPECIFIED SITE OF BREAST: Primary | ICD-10-CM

## 2022-10-18 DIAGNOSIS — C50.912 MALIGNANT NEOPLASM OF LEFT BREAST IN FEMALE, ESTROGEN RECEPTOR NEGATIVE, UNSPECIFIED SITE OF BREAST: Primary | ICD-10-CM

## 2022-10-19 ENCOUNTER — OFFICE VISIT (OUTPATIENT)
Dept: HEMATOLOGY/ONCOLOGY | Facility: CLINIC | Age: 47
End: 2022-10-19
Payer: COMMERCIAL

## 2022-10-19 ENCOUNTER — PATIENT MESSAGE (OUTPATIENT)
Dept: HEMATOLOGY/ONCOLOGY | Facility: CLINIC | Age: 47
End: 2022-10-19

## 2022-10-19 VITALS
DIASTOLIC BLOOD PRESSURE: 90 MMHG | TEMPERATURE: 99 F | WEIGHT: 154 LBS | BODY MASS INDEX: 29.07 KG/M2 | OXYGEN SATURATION: 98 % | HEIGHT: 61 IN | HEART RATE: 91 BPM | RESPIRATION RATE: 16 BRPM | SYSTOLIC BLOOD PRESSURE: 162 MMHG

## 2022-10-19 DIAGNOSIS — C50.919 TRIPLE NEGATIVE BREAST CANCER: ICD-10-CM

## 2022-10-19 DIAGNOSIS — Z71.9 ENCOUNTER FOR EDUCATION: ICD-10-CM

## 2022-10-19 DIAGNOSIS — R11.2 CINV (CHEMOTHERAPY-INDUCED NAUSEA AND VOMITING): Primary | ICD-10-CM

## 2022-10-19 DIAGNOSIS — T45.1X5A CINV (CHEMOTHERAPY-INDUCED NAUSEA AND VOMITING): Primary | ICD-10-CM

## 2022-10-19 PROCEDURE — 1160F RVW MEDS BY RX/DR IN RCRD: CPT | Mod: CPTII,S$GLB,, | Performed by: NURSE PRACTITIONER

## 2022-10-19 PROCEDURE — 99215 PR OFFICE/OUTPT VISIT, EST, LEVL V, 40-54 MIN: ICD-10-PCS | Mod: S$GLB,,, | Performed by: NURSE PRACTITIONER

## 2022-10-19 PROCEDURE — 99215 OFFICE O/P EST HI 40 MIN: CPT | Mod: S$GLB,,, | Performed by: NURSE PRACTITIONER

## 2022-10-19 PROCEDURE — 1159F MED LIST DOCD IN RCRD: CPT | Mod: CPTII,S$GLB,, | Performed by: NURSE PRACTITIONER

## 2022-10-19 PROCEDURE — 1160F PR REVIEW ALL MEDS BY PRESCRIBER/CLIN PHARMACIST DOCUMENTED: ICD-10-PCS | Mod: CPTII,S$GLB,, | Performed by: NURSE PRACTITIONER

## 2022-10-19 PROCEDURE — 1159F PR MEDICATION LIST DOCUMENTED IN MEDICAL RECORD: ICD-10-PCS | Mod: CPTII,S$GLB,, | Performed by: NURSE PRACTITIONER

## 2022-10-19 PROCEDURE — 3080F DIAST BP >= 90 MM HG: CPT | Mod: CPTII,S$GLB,, | Performed by: NURSE PRACTITIONER

## 2022-10-19 PROCEDURE — 3077F SYST BP >= 140 MM HG: CPT | Mod: CPTII,S$GLB,, | Performed by: NURSE PRACTITIONER

## 2022-10-19 PROCEDURE — 3080F PR MOST RECENT DIASTOLIC BLOOD PRESSURE >= 90 MM HG: ICD-10-PCS | Mod: CPTII,S$GLB,, | Performed by: NURSE PRACTITIONER

## 2022-10-19 PROCEDURE — 4010F PR ACE/ARB THEARPY RXD/TAKEN: ICD-10-PCS | Mod: CPTII,S$GLB,, | Performed by: NURSE PRACTITIONER

## 2022-10-19 PROCEDURE — 3077F PR MOST RECENT SYSTOLIC BLOOD PRESSURE >= 140 MM HG: ICD-10-PCS | Mod: CPTII,S$GLB,, | Performed by: NURSE PRACTITIONER

## 2022-10-19 PROCEDURE — 4010F ACE/ARB THERAPY RXD/TAKEN: CPT | Mod: CPTII,S$GLB,, | Performed by: NURSE PRACTITIONER

## 2022-10-19 RX ORDER — ONDANSETRON 8 MG/1
8 TABLET, ORALLY DISINTEGRATING ORAL EVERY 6 HOURS PRN
Qty: 30 TABLET | Refills: 3 | Status: SHIPPED | OUTPATIENT
Start: 2022-10-19 | End: 2023-10-19

## 2022-10-19 RX ORDER — PROMETHAZINE HYDROCHLORIDE 25 MG/1
25 TABLET ORAL EVERY 8 HOURS
Qty: 30 TABLET | Refills: 3 | Status: SHIPPED | OUTPATIENT
Start: 2022-10-19

## 2022-10-19 NOTE — PROGRESS NOTES
MEDICAL ONCOLOGY INITIAL CONSULTATION NOTE    Patient ID: Tequila Hernandez is a 46 y.o. female.    Chief Complaint:  Breast cancer    HPI:  Patient is a 46-year-old female with past medical history of diabetes mellitus, hypertension with recent diagnosis of triple negative breast cancer for which she underwent left breast partial mastectomy and sentinel lymph node biopsy.  Patient reports recovering well from recent surgery and presents to our clinic today for further evaluation.  Voices no acute complaints.  She also has undergone genetic testing with surgery does of her young age and triple negative status.      Pathology:     10/7/22:  1. LEFT AXILLARY SENTINEL LYMPH NODE, EXCISIONAL BIOPSY:   - BENIGN REACTIVE SENTINEL LYMPH NODE EXAMINED, AND IT IS NEGATIVE FOR   METASTATIC CARCINOMA (0/1).   2. LEFT BREAST, LEFT SIMPLE MASTECTOMY:   - RESIDUAL INFILTRATING DUCTAL CARCINOMA, BOO GRADE 3, SIZE 13 MM, ASSOCIATED WITH PRIOR    BIOPSY SCAR/SEED MARKER, MARGINS UNINVOLVED IN THIS SPECIMEN, SEE TUMOR SUMMARY.   - NO EVIDENCE OF DCIS.   - NONPROLIFERATIVE FIBROCYSTIC CHANGES AND PSEUDOANGIOMATOUS   HYPERPLASIA(PASH) PRESENT      COMPRISING A GROSSLY EVIDENT 8 MM NODULE AS WELL INCIDENTAL SMALL   FIBROADENOMA..   3. BREAST, ADDITIONAL SUPERIOR MARGIN, MARGIN EXCISION:   - BENIGN MAMMARY TISSUE AND ADIPOSE TISSUE, NEGATIVE FOR TUMOR, MARGIN   CLEAR.   4. LEFT BREAST, ADDITIONAL MEDIAL MARGIN, MARGIN EXCISION:   - BENIGN MAMMARY TISSUE AND ADIPOSE TISSUE, NEGATIVE FOR TUMOR, MARGIN   CLEAR.   LEFT  BREAST TUMOR SUMMARY(INCORPORATES PARTS 1 TO 4):   SPECIMEN TYPE AND PROCEDURE:      Simple mastectomy with additional margin   excisions and sentinel node biopsy.   SPECIMEN LATERALITY:         Left.   TUMOR SITE (QUADRANT):         Not specified.   SPECIMEN INTEGRITY:         Intact    HISTOLOGIC TYPE:         Infiltrating ductal carcinoma.   COMBINED HISTOLOGIC GRADE:      Boo Grade 3   BOO HISTOLOGIC  "SCORE:      3,3,,3= total score 9 of 9    SIZE OF INVASIVE COMPONENT:      13 mm   TUMOR FOCALITY:         Unifocal.   TUMOR NECROSIS:         Not identified.   LYMPHOVASCULAR INVASION:      Not identified.   PERINEURAL INVASION:         Not identified   SKIN:               Not applicable (Skin is present and uninvolved.)   NIPPLE:               Not applicable (Nipple is not present.)   SIZE/TYPE/EXTENT INTRADUCTAL CANCER:  Not identified.        EXTENSIVE INTRADUCTAL COMPONENT (EIC): Not applicable (No DCIS).        NECROSIS:            Not applicable.   MICROCALCIFICATION:         Not identified   SURGICAL MARGINS:             INVASIVE:      Negative                IN SITU:      Not applicable.   DISTANCE TO CLOSEST MARGIN:      Invasive carcinoma extends to 3.5 mm of   superior margin.   LYMPH NODES:                 # TOTAL LYMPH NODES EXAMINED:   1  (1 sentinel in part 1)                 # SENTINEL NODES EXAMINED:   1                 # NODES WITH MACROMETASTASIS:   0                 # NODES WITH MICROMETASTASIS:   0    METASTASIS:            Not applicable.   TREATMENT EFFECT:         No known prior therapy.   TNM CODE:            pT1c  pN0(sn)  M-n/a  G3   ANCILLARY STUDIES: BREAST PROGNOSTIC PANEL: Per prior biopsy [id number], the   tumor is ER-, SC-, HER2 negative ("Triple Negative").   ___________________________________________________________________________    Imaging:     Past Medical History:   Diagnosis Date    Depression     Diabetes mellitus     DM (diabetes mellitus), type 2     Elderly multigravida     Hypertension      Family History   Problem Relation Age of Onset    Hypertension Mother     Diabetes Mother     Hyperlipidemia Mother     Hyperthyroidism Mother     Stroke Maternal Uncle     No Known Problems Father     No Known Problems Sister     No Known Problems Brother     No Known Problems Maternal Grandmother     No Known Problems Maternal Grandfather     No Known Problems Paternal Grandmother  "    No Known Problems Paternal Grandfather      Social History     Socioeconomic History    Marital status:    Tobacco Use    Smoking status: Never    Smokeless tobacco: Never   Substance and Sexual Activity    Alcohol use: Not Currently    Drug use: Never    Sexual activity: Yes     Partners: Male     Birth control/protection: None     Comment: history of IUD     Past Surgical History:   Procedure Laterality Date    LEFT OOPHORECTOMY Left 2012    left ovary removed         Review of systems:  Review of Systems   Constitutional:  Negative for activity change, appetite change, chills, diaphoresis, fatigue and unexpected weight change.   HENT:  Negative for congestion, facial swelling, hearing loss, mouth sores, trouble swallowing and voice change.    Eyes:  Negative for photophobia, pain, discharge and itching.   Respiratory:  Negative for apnea, cough, choking, chest tightness and shortness of breath.    Cardiovascular:  Negative for chest pain, palpitations and leg swelling.   Gastrointestinal:  Negative for abdominal distention, abdominal pain, anal bleeding and blood in stool.   Endocrine: Negative for cold intolerance, heat intolerance, polydipsia and polyphagia.   Genitourinary:  Negative for difficulty urinating, dysuria, flank pain and hematuria.   Musculoskeletal:  Negative for arthralgias, back pain, joint swelling, myalgias, neck pain and neck stiffness.        Positive for breast mass. See HPI   Skin:  Negative for color change, pallor and wound.   Allergic/Immunologic: Negative for environmental allergies, food allergies and immunocompromised state.   Neurological:  Negative for dizziness, seizures, facial asymmetry, speech difficulty, light-headedness, numbness and headaches.   Hematological:  Negative for adenopathy. Does not bruise/bleed easily.   Psychiatric/Behavioral:  Negative for agitation, behavioral problems, confusion, decreased concentration and sleep disturbance.        Physical  Exam  Vitals and nursing note reviewed.   Constitutional:       General: She is not in acute distress.     Appearance: Normal appearance. She is not ill-appearing.   HENT:      Head: Normocephalic and atraumatic.      Nose: No congestion or rhinorrhea.   Eyes:      General: No scleral icterus.     Extraocular Movements: Extraocular movements intact.      Pupils: Pupils are equal, round, and reactive to light.   Cardiovascular:      Rate and Rhythm: Normal rate and regular rhythm.      Pulses: Normal pulses.      Heart sounds: Normal heart sounds. No murmur heard.    No gallop.   Pulmonary:      Effort: Pulmonary effort is normal. No respiratory distress.      Breath sounds: Normal breath sounds. No stridor. No wheezing or rhonchi.   Abdominal:      General: Bowel sounds are normal. There is no distension.      Palpations: There is no mass.      Tenderness: There is no abdominal tenderness. There is no guarding.   Musculoskeletal:         General: No swelling, tenderness, deformity or signs of injury. Normal range of motion.      Cervical back: Normal range of motion and neck supple. No rigidity. No muscular tenderness.      Right lower leg: No edema.      Left lower leg: No edema.   Skin:     General: Skin is warm.      Coloration: Skin is not jaundiced or pale.      Findings: No bruising or lesion.      Comments: Status post left breast lumpectomy.  Healing well   Neurological:      General: No focal deficit present.      Mental Status: She is alert and oriented to person, place, and time.      Cranial Nerves: No cranial nerve deficit.      Sensory: No sensory deficit.      Motor: No weakness.      Gait: Gait normal.   Psychiatric:         Mood and Affect: Mood normal.         Behavior: Behavior normal.         Thought Content: Thought content normal.                       Vitals:    10/19/22 0928   BP: (!) 162/90   Pulse: 91   Resp: 16   Temp: 99.3 °F (37.4 °C)   Body surface area is 1.73 meters  squared.    Assessment/Plan:      ECOG 1    Infiltrating ductal carcinoma arising from the left breast in female: Stage IB  == Triple negative.  Status post left breast lumpectomy and sentinel lymph node evaluation  ==  pT1c  pN0(sn)  M-n/a  G3.  Size of invasive component is more than 1 cm (13 mm).  Based on her triple negative status and young age my recommendation would be for adjuvant chemotherapy with dose dense AC x4 followed by weekly paclitaxel for 12 cycles.  Post lumpectomy and after completion of chemotherapy patient will be followed by radiation oncology for evaluation of radiation treatment.   == Patient will also need genetic testing and based on documentation from surgery she is agreeable to that.  == will obtain prior authorization request and obtain echocardiogram prior.  Patient will also need port placement and referral has been made in this regard   10/19/22:  Patient here today to discuss upcoming chemotherapy, side effect profile, risk versus benefits, and expected outcomes have been discussed today. All questions and concerns answered and consents have been signed. Echo planned for tomorrow, port placement TBD. Plan to start treatment on 11/3/22 as she had lumpectomy 10/4/22.       Plan:  To start treatment 11/3/2022  Port placement IV with [facility] on Monday   2D echocardiogram tomorrow   Genetic testing completed in [facility] clinic last week    RTC on 113/322 with [facility] cmp upt to start treatmetnt    Total time spent in counseling and discussion about further management options including relevant lab work, treatment,  prognosis, medications and intended side effects was more than 60 minutes. More than 50% of the time was spent on counseling and coordination of care.  I spent a total of 60 minutes on the day of the visit.This includes face to face time and non-face to face time preparing to see the patient (eg, review of tests), Obtaining and/or reviewing separately obtained history, Documenting  clinical information in the electronic or other health record, Independently interpreting resultsand communicating results to the patient/family/caregiver, or Care coordination.

## 2022-10-21 LAB — SPECIMEN TO PATHOLOGY: NORMAL

## 2022-10-24 ENCOUNTER — OFFICE VISIT (OUTPATIENT)
Dept: SURGERY | Facility: CLINIC | Age: 47
End: 2022-10-24
Payer: COMMERCIAL

## 2022-10-24 DIAGNOSIS — C50.912 MALIGNANT NEOPLASM OF LEFT BREAST IN FEMALE, ESTROGEN RECEPTOR NEGATIVE, UNSPECIFIED SITE OF BREAST: Primary | ICD-10-CM

## 2022-10-24 DIAGNOSIS — Z17.1 MALIGNANT NEOPLASM OF LEFT BREAST IN FEMALE, ESTROGEN RECEPTOR NEGATIVE, UNSPECIFIED SITE OF BREAST: Primary | ICD-10-CM

## 2022-10-24 PROCEDURE — 1160F RVW MEDS BY RX/DR IN RCRD: CPT | Mod: CPTII,S$GLB,, | Performed by: SURGERY

## 2022-10-24 PROCEDURE — 4010F PR ACE/ARB THEARPY RXD/TAKEN: ICD-10-PCS | Mod: CPTII,S$GLB,, | Performed by: SURGERY

## 2022-10-24 PROCEDURE — 1160F PR REVIEW ALL MEDS BY PRESCRIBER/CLIN PHARMACIST DOCUMENTED: ICD-10-PCS | Mod: CPTII,S$GLB,, | Performed by: SURGERY

## 2022-10-24 PROCEDURE — 99213 OFFICE O/P EST LOW 20 MIN: CPT | Mod: 24,S$GLB,, | Performed by: SURGERY

## 2022-10-24 PROCEDURE — 99213 PR OFFICE/OUTPT VISIT, EST, LEVL III, 20-29 MIN: ICD-10-PCS | Mod: 24,S$GLB,, | Performed by: SURGERY

## 2022-10-24 PROCEDURE — 4010F ACE/ARB THERAPY RXD/TAKEN: CPT | Mod: CPTII,S$GLB,, | Performed by: SURGERY

## 2022-10-24 PROCEDURE — 1159F PR MEDICATION LIST DOCUMENTED IN MEDICAL RECORD: ICD-10-PCS | Mod: CPTII,S$GLB,, | Performed by: SURGERY

## 2022-10-24 PROCEDURE — 1159F MED LIST DOCD IN RCRD: CPT | Mod: CPTII,S$GLB,, | Performed by: SURGERY

## 2022-10-24 NOTE — PROGRESS NOTES
History & Physical    SUBJECTIVE:     History of Present Illness:    46-year-old female known to me from previous left partial mastectomy with sentinel node biopsy for triple negative invasive ductal breast cancer, stage I B.  She has seen Medical Oncology with plans for starting chemotherapy November 3, 2022.  I have been asked for placement of MediPort for venous access    Chief Complaint   Patient presents with    Other     Port placement          Review of patient's allergies indicates:  Review of patient's allergies indicates:  No Known Allergies    Current Outpatient Medications on File Prior to Visit   Medication Sig Dispense Refill    atorvastatin (LIPITOR) 10 MG tablet Take 10 mg by mouth once daily.      losartan (COZAAR) 50 MG tablet       metFORMIN (GLUCOPHAGE) 1000 MG tablet       ondansetron (ZOFRAN-ODT) 8 MG TbDL Take 1 tablet (8 mg total) by mouth every 6 (six) hours as needed. 30 tablet 3    OZEMPIC 0.25 mg or 0.5 mg(2 mg/1.5 mL) pen injector INJECT 0.5 MG UNDER THE SKIN ONCE A WEEK      promethazine (PHENERGAN) 25 MG tablet Take 1 tablet (25 mg total) by mouth every 8 (eight) hours. 30 tablet 3     No current facility-administered medications on file prior to visit.       Past Medical History:   Diagnosis Date    Depression     Diabetes mellitus     DM (diabetes mellitus), type 2     Elderly multigravida     Hypertension      Past Surgical History:   Procedure Laterality Date    LEFT OOPHORECTOMY Left 2012    left ovary removed     Family History   Problem Relation Age of Onset    Hypertension Mother     Diabetes Mother     Hyperlipidemia Mother     Hyperthyroidism Mother     Stroke Maternal Uncle     No Known Problems Father     No Known Problems Sister     No Known Problems Brother     No Known Problems Maternal Grandmother     No Known Problems Maternal Grandfather     No Known Problems Paternal Grandmother     No Known Problems Paternal Grandfather        Social History     Socioeconomic  History    Marital status:    Tobacco Use    Smoking status: Never    Smokeless tobacco: Never   Substance and Sexual Activity    Alcohol use: Not Currently    Drug use: Never    Sexual activity: Yes     Partners: Male     Birth control/protection: None     Comment: history of IUD          Review of Systems   Constitutional: Negative.    Cardiovascular: Negative.    Gastrointestinal: Negative.    Musculoskeletal: Negative.    Neurological: Negative.    Endo/Heme/Allergies: Negative.    Psychiatric/Behavioral: Negative.       OBJECTIVE:     There were no vitals filed for this visit.              Physical Exam:  Physical Exam  HENT:      Head: Normocephalic.   Eyes:      Pupils: Pupils are equal, round, and reactive to light.   Cardiovascular:      Rate and Rhythm: Normal rate and regular rhythm.   Pulmonary:      Breath sounds: Normal breath sounds.   Abdominal:      General: Abdomen is flat. Bowel sounds are normal.      Palpations: Abdomen is soft.   Musculoskeletal:         General: No swelling. Normal range of motion.      Cervical back: Normal range of motion.   Skin:     General: Skin is warm and dry.   Neurological:      General: No focal deficit present.      Mental Status: She is alert and oriented to person, place, and time.      Cranial Nerves: No cranial nerve deficit.   Psychiatric:         Mood and Affect: Mood normal.         Behavior: Behavior normal.           ASSESSMENT/PLAN:   Infiltrating ductal carcinoma left breast, triple negative, stage I B preparing for chemotherapy  PLAN:  Discussed placement of MediPort right subclavian vein, procedure risk and benefits all discussed.  Surgery scheduled for November 1, 2022.  Chemo scheduled for November 3rd.

## 2022-10-25 LAB
ANION GAP SERPL CALC-SCNC: 12 MMOL/L (ref 3–11)
BASOPHILS NFR BLD: 0.3 % (ref 0–3)
BUN SERPL-MCNC: 11 MG/DL (ref 7–18)
BUN/CREAT SERPL: 18.03 RATIO (ref 7–18)
CALCIUM SERPL-MCNC: 9.1 MG/DL (ref 8.8–10.5)
CHLORIDE SERPL-SCNC: 101 MMOL/L (ref 100–108)
CO2 SERPL-SCNC: 25 MMOL/L (ref 21–32)
CREAT SERPL-MCNC: 0.61 MG/DL (ref 0.55–1.02)
EOSINOPHIL NFR BLD: 3.8 % (ref 1–3)
ERYTHROCYTE [DISTWIDTH] IN BLOOD BY AUTOMATED COUNT: 13.2 % (ref 12.5–18)
GFR ESTIMATION: > 60
GLUCOSE SERPL-MCNC: 161 MG/DL (ref 70–110)
HCG QUALITATIVE: NEGATIVE
HCT VFR BLD AUTO: 39.2 % (ref 37–47)
HGB BLD-MCNC: 13.5 G/DL (ref 12–16)
LYMPHOCYTES NFR BLD: 18.4 % (ref 25–40)
MCH RBC QN AUTO: 29.5 PG (ref 27–31.2)
MCHC RBC AUTO-ENTMCNC: 34.4 G/DL (ref 31.8–35.4)
MCV RBC AUTO: 85.6 FL (ref 80–97)
MONOCYTES NFR BLD: 8.9 % (ref 1–15)
NEUTROPHILS # BLD AUTO: 4.84 10*3/UL (ref 1.8–7.7)
NEUTROPHILS NFR BLD: 68.3 % (ref 37–80)
NUCLEATED RED BLOOD CELLS: 0 %
PLATELETS: 354 10*3/UL (ref 142–424)
POTASSIUM SERPL-SCNC: 3.7 MMOL/L (ref 3.6–5.2)
RBC # BLD AUTO: 4.58 10*6/UL (ref 4.2–5.4)
SODIUM BLD-SCNC: 138 MMOL/L (ref 135–145)
WBC # BLD: 7.1 10*3/UL (ref 4.6–10.2)

## 2022-10-26 PROBLEM — Z17.421 TRIPLE NEGATIVE BREAST CANCER: Status: ACTIVE | Noted: 2022-10-26

## 2022-10-26 PROBLEM — C50.919 TRIPLE NEGATIVE BREAST CANCER: Status: ACTIVE | Noted: 2022-10-26

## 2022-11-01 ENCOUNTER — OUTSIDE PLACE OF SERVICE (OUTPATIENT)
Dept: ADMINISTRATIVE | Facility: OTHER | Age: 47
End: 2022-11-01
Payer: COMMERCIAL

## 2022-11-01 LAB
GLUCOSE SERPL-MCNC: 101 MG/DL (ref 70–105)
GLUCOSE SERPL-MCNC: 103 MG/DL (ref 70–105)

## 2022-11-01 PROCEDURE — 36561 PR INSERT TUNNELED CV CATH WITH PORT: ICD-10-PCS | Mod: 79,RT,, | Performed by: SURGERY

## 2022-11-01 PROCEDURE — 77001 FLUOROGUIDE FOR VEIN DEVICE: CPT | Mod: 26,,, | Performed by: SURGERY

## 2022-11-01 PROCEDURE — 77001 CHG FLUOROGUIDE CNTRL VEN ACCESS,PLACE,REPLACE,REMOVE: ICD-10-PCS | Mod: 26,,, | Performed by: SURGERY

## 2022-11-01 PROCEDURE — 36561 INSERT TUNNELED CV CATH: CPT | Mod: 79,RT,, | Performed by: SURGERY

## 2022-11-03 ENCOUNTER — OFFICE VISIT (OUTPATIENT)
Dept: HEMATOLOGY/ONCOLOGY | Facility: CLINIC | Age: 47
End: 2022-11-03
Payer: COMMERCIAL

## 2022-11-03 VITALS
DIASTOLIC BLOOD PRESSURE: 88 MMHG | HEART RATE: 77 BPM | SYSTOLIC BLOOD PRESSURE: 139 MMHG | TEMPERATURE: 99 F | HEIGHT: 61 IN | OXYGEN SATURATION: 98 % | RESPIRATION RATE: 15 BRPM | BODY MASS INDEX: 29.64 KG/M2 | WEIGHT: 157 LBS

## 2022-11-03 DIAGNOSIS — C50.919 TRIPLE NEGATIVE BREAST CANCER: Primary | ICD-10-CM

## 2022-11-03 PROCEDURE — 3079F DIAST BP 80-89 MM HG: CPT | Mod: CPTII,S$GLB,, | Performed by: NURSE PRACTITIONER

## 2022-11-03 PROCEDURE — 3008F BODY MASS INDEX DOCD: CPT | Mod: CPTII,S$GLB,, | Performed by: NURSE PRACTITIONER

## 2022-11-03 PROCEDURE — 1160F RVW MEDS BY RX/DR IN RCRD: CPT | Mod: CPTII,S$GLB,, | Performed by: NURSE PRACTITIONER

## 2022-11-03 PROCEDURE — 1159F PR MEDICATION LIST DOCUMENTED IN MEDICAL RECORD: ICD-10-PCS | Mod: CPTII,S$GLB,, | Performed by: NURSE PRACTITIONER

## 2022-11-03 PROCEDURE — 99214 OFFICE O/P EST MOD 30 MIN: CPT | Mod: S$GLB,,, | Performed by: NURSE PRACTITIONER

## 2022-11-03 PROCEDURE — 3075F PR MOST RECENT SYSTOLIC BLOOD PRESS GE 130-139MM HG: ICD-10-PCS | Mod: CPTII,S$GLB,, | Performed by: NURSE PRACTITIONER

## 2022-11-03 PROCEDURE — 99214 PR OFFICE/OUTPT VISIT, EST, LEVL IV, 30-39 MIN: ICD-10-PCS | Mod: S$GLB,,, | Performed by: NURSE PRACTITIONER

## 2022-11-03 PROCEDURE — 4010F ACE/ARB THERAPY RXD/TAKEN: CPT | Mod: CPTII,S$GLB,, | Performed by: NURSE PRACTITIONER

## 2022-11-03 PROCEDURE — 3075F SYST BP GE 130 - 139MM HG: CPT | Mod: CPTII,S$GLB,, | Performed by: NURSE PRACTITIONER

## 2022-11-03 PROCEDURE — 1159F MED LIST DOCD IN RCRD: CPT | Mod: CPTII,S$GLB,, | Performed by: NURSE PRACTITIONER

## 2022-11-03 PROCEDURE — 3079F PR MOST RECENT DIASTOLIC BLOOD PRESSURE 80-89 MM HG: ICD-10-PCS | Mod: CPTII,S$GLB,, | Performed by: NURSE PRACTITIONER

## 2022-11-03 PROCEDURE — 3008F PR BODY MASS INDEX (BMI) DOCUMENTED: ICD-10-PCS | Mod: CPTII,S$GLB,, | Performed by: NURSE PRACTITIONER

## 2022-11-03 PROCEDURE — 4010F PR ACE/ARB THEARPY RXD/TAKEN: ICD-10-PCS | Mod: CPTII,S$GLB,, | Performed by: NURSE PRACTITIONER

## 2022-11-03 PROCEDURE — 1160F PR REVIEW ALL MEDS BY PRESCRIBER/CLIN PHARMACIST DOCUMENTED: ICD-10-PCS | Mod: CPTII,S$GLB,, | Performed by: NURSE PRACTITIONER

## 2022-11-03 RX ORDER — DOXORUBICIN HYDROCHLORIDE 2 MG/ML
60 INJECTION, SOLUTION INTRAVENOUS
Status: CANCELLED | OUTPATIENT
Start: 2022-11-03

## 2022-11-03 RX ORDER — HEPARIN 100 UNIT/ML
500 SYRINGE INTRAVENOUS
Status: CANCELLED | OUTPATIENT
Start: 2022-11-03

## 2022-11-03 RX ORDER — SODIUM CHLORIDE 0.9 % (FLUSH) 0.9 %
10 SYRINGE (ML) INJECTION
Status: CANCELLED | OUTPATIENT
Start: 2022-11-03

## 2022-11-03 NOTE — PROGRESS NOTES
MEDICAL ONCOLOGY INITIAL CONSULTATION NOTE    Patient ID: Tequila Hernandez is a 46 y.o. female.    Chief Complaint:  Breast cancer    HPI:  Patient is a 46-year-old female with past medical history of diabetes mellitus, hypertension with recent diagnosis of triple negative breast cancer for which she underwent left breast partial mastectomy and sentinel lymph node biopsy.  Patient reports recovering well from recent surgery and presents to our clinic today for further evaluation.  Voices no acute complaints.  She also has undergone genetic testing with surgery does of her young age and triple negative status.      Pathology:     10/7/22:  1. LEFT AXILLARY SENTINEL LYMPH NODE, EXCISIONAL BIOPSY:   - BENIGN REACTIVE SENTINEL LYMPH NODE EXAMINED, AND IT IS NEGATIVE FOR   METASTATIC CARCINOMA (0/1).   2. LEFT BREAST, LEFT SIMPLE MASTECTOMY:   - RESIDUAL INFILTRATING DUCTAL CARCINOMA, BOO GRADE 3, SIZE 13 MM, ASSOCIATED WITH PRIOR    BIOPSY SCAR/SEED MARKER, MARGINS UNINVOLVED IN THIS SPECIMEN, SEE TUMOR SUMMARY.   - NO EVIDENCE OF DCIS.   - NONPROLIFERATIVE FIBROCYSTIC CHANGES AND PSEUDOANGIOMATOUS   HYPERPLASIA(PASH) PRESENT      COMPRISING A GROSSLY EVIDENT 8 MM NODULE AS WELL INCIDENTAL SMALL   FIBROADENOMA..   3. BREAST, ADDITIONAL SUPERIOR MARGIN, MARGIN EXCISION:   - BENIGN MAMMARY TISSUE AND ADIPOSE TISSUE, NEGATIVE FOR TUMOR, MARGIN   CLEAR.   4. LEFT BREAST, ADDITIONAL MEDIAL MARGIN, MARGIN EXCISION:   - BENIGN MAMMARY TISSUE AND ADIPOSE TISSUE, NEGATIVE FOR TUMOR, MARGIN   CLEAR.   LEFT  BREAST TUMOR SUMMARY(INCORPORATES PARTS 1 TO 4):   SPECIMEN TYPE AND PROCEDURE:      Simple mastectomy with additional margin   excisions and sentinel node biopsy.   SPECIMEN LATERALITY:         Left.   TUMOR SITE (QUADRANT):         Not specified.   SPECIMEN INTEGRITY:         Intact    HISTOLOGIC TYPE:         Infiltrating ductal carcinoma.   COMBINED HISTOLOGIC GRADE:      Boo Grade 3   BOO HISTOLOGIC  "SCORE:      3,3,,3= total score 9 of 9    SIZE OF INVASIVE COMPONENT:      13 mm   TUMOR FOCALITY:         Unifocal.   TUMOR NECROSIS:         Not identified.   LYMPHOVASCULAR INVASION:      Not identified.   PERINEURAL INVASION:         Not identified   SKIN:               Not applicable (Skin is present and uninvolved.)   NIPPLE:               Not applicable (Nipple is not present.)   SIZE/TYPE/EXTENT INTRADUCTAL CANCER:  Not identified.        EXTENSIVE INTRADUCTAL COMPONENT (EIC): Not applicable (No DCIS).        NECROSIS:            Not applicable.   MICROCALCIFICATION:         Not identified   SURGICAL MARGINS:             INVASIVE:      Negative                IN SITU:      Not applicable.   DISTANCE TO CLOSEST MARGIN:      Invasive carcinoma extends to 3.5 mm of   superior margin.   LYMPH NODES:                 # TOTAL LYMPH NODES EXAMINED:   1  (1 sentinel in part 1)                 # SENTINEL NODES EXAMINED:   1                 # NODES WITH MACROMETASTASIS:   0                 # NODES WITH MICROMETASTASIS:   0    METASTASIS:            Not applicable.   TREATMENT EFFECT:         No known prior therapy.   TNM CODE:            pT1c  pN0(sn)  M-n/a  G3   ANCILLARY STUDIES: BREAST PROGNOSTIC PANEL: Per prior biopsy [id number], the   tumor is ER-, CT-, HER2 negative ("Triple Negative").   ___________________________________________________________________________    Imaging:     Past Medical History:   Diagnosis Date    Depression     Diabetes mellitus     DM (diabetes mellitus), type 2     Elderly multigravida     Hypertension      Family History   Problem Relation Age of Onset    Hypertension Mother     Diabetes Mother     Hyperlipidemia Mother     Hyperthyroidism Mother     Stroke Maternal Uncle     No Known Problems Father     No Known Problems Sister     No Known Problems Brother     No Known Problems Maternal Grandmother     No Known Problems Maternal Grandfather     No Known Problems Paternal Grandmother  "    No Known Problems Paternal Grandfather      Social History     Socioeconomic History    Marital status:    Tobacco Use    Smoking status: Never    Smokeless tobacco: Never   Substance and Sexual Activity    Alcohol use: Not Currently    Drug use: Never    Sexual activity: Yes     Partners: Male     Birth control/protection: None     Comment: history of IUD     Past Surgical History:   Procedure Laterality Date    LEFT OOPHORECTOMY Left 2012    left ovary removed         Review of systems:  Review of Systems   Constitutional:  Negative for activity change, appetite change, chills, diaphoresis, fatigue and unexpected weight change.   HENT:  Negative for congestion, facial swelling, hearing loss, mouth sores, trouble swallowing and voice change.    Eyes:  Negative for photophobia, pain, discharge and itching.   Respiratory:  Negative for apnea, cough, choking, chest tightness and shortness of breath.    Cardiovascular:  Negative for chest pain, palpitations and leg swelling.   Gastrointestinal:  Negative for abdominal distention, abdominal pain, anal bleeding and blood in stool.   Endocrine: Negative for cold intolerance, heat intolerance, polydipsia and polyphagia.   Genitourinary:  Negative for difficulty urinating, dysuria, flank pain and hematuria.   Musculoskeletal:  Negative for arthralgias, back pain, joint swelling, myalgias, neck pain and neck stiffness.        Positive for breast mass. See HPI   Skin:  Negative for color change, pallor and wound.   Allergic/Immunologic: Negative for environmental allergies, food allergies and immunocompromised state.   Neurological:  Negative for dizziness, seizures, facial asymmetry, speech difficulty, light-headedness, numbness and headaches.   Hematological:  Negative for adenopathy. Does not bruise/bleed easily.   Psychiatric/Behavioral:  Negative for agitation, behavioral problems, confusion, decreased concentration and sleep disturbance.        Physical  Exam  Vitals and nursing note reviewed.   Constitutional:       General: She is not in acute distress.     Appearance: Normal appearance. She is not ill-appearing.   HENT:      Head: Normocephalic and atraumatic.      Nose: No congestion or rhinorrhea.   Eyes:      General: No scleral icterus.     Extraocular Movements: Extraocular movements intact.      Pupils: Pupils are equal, round, and reactive to light.   Cardiovascular:      Rate and Rhythm: Normal rate and regular rhythm.      Pulses: Normal pulses.      Heart sounds: Normal heart sounds. No murmur heard.    No gallop.   Pulmonary:      Effort: Pulmonary effort is normal. No respiratory distress.      Breath sounds: Normal breath sounds. No stridor. No wheezing or rhonchi.   Abdominal:      General: Bowel sounds are normal. There is no distension.      Palpations: There is no mass.      Tenderness: There is no abdominal tenderness. There is no guarding.   Musculoskeletal:         General: No swelling, tenderness, deformity or signs of injury. Normal range of motion.      Cervical back: Normal range of motion and neck supple. No rigidity. No muscular tenderness.      Right lower leg: No edema.      Left lower leg: No edema.   Skin:     General: Skin is warm.      Coloration: Skin is not jaundiced or pale.      Findings: No bruising or lesion.      Comments: Status post left breast lumpectomy.  Healing well   Neurological:      General: No focal deficit present.      Mental Status: She is alert and oriented to person, place, and time.      Cranial Nerves: No cranial nerve deficit.      Sensory: No sensory deficit.      Motor: No weakness.      Gait: Gait normal.   Psychiatric:         Mood and Affect: Mood normal.         Behavior: Behavior normal.         Thought Content: Thought content normal.                       Vitals:    11/03/22 0812   BP: 139/88   Pulse: 77   Resp: 15   Temp: 98.7 °F (37.1 °C)   Body surface area is 1.75 meters  squared.    Assessment/Plan:      ECOG 1    Infiltrating ductal carcinoma arising from the left breast in female: Stage IB  == Triple negative.  Status post left breast lumpectomy and sentinel lymph node evaluation  ==  pT1c  pN0(sn)  M-n/a  G3.  Size of invasive component is more than 1 cm (13 mm).  Based on her triple negative status and young age my recommendation would be for adjuvant chemotherapy with dose dense AC x4 followed by weekly paclitaxel for 12 cycles.  Post lumpectomy and after completion of chemotherapy patient will be followed by radiation oncology for evaluation of radiation treatment.   == Patient will also need genetic testing and based on documentation from surgery she is agreeable to that.  == will obtain prior authorization request and obtain echocardiogram prior.  Patient will also need port placement and referral has been made in this regard   10/19/22:  Patient here today to discuss upcoming chemotherapy, side effect profile, risk versus benefits, and expected outcomes have been discussed today. All questions and concerns answered and consents have been signed. Echo planned for tomorrow, port placement TBD. Plan to start treatment on 11/3/22 as she had lumpectomy 10/4/22.   11/3/22:  Status post port placement on Tuesday with Dr. Huerta, review of echo on 10/22/2022 shows EF> 55%, labs reviewed and patient is cleared to begin cycle 1 dose dense AC with Neulasta support as planned for today    Plan:  Okay to proceed with cycle 1 dose dense AC  Genetic testing completed with Dr Huerta PENDING    RTC in 2 weeks with cbc cmp upt     Total time spent in counseling and discussion about further management options including relevant lab work, treatment,  prognosis, medications and intended side effects was more than 35 minutes. More than 50% of the time was spent on counseling and coordination of care.  I spent a total of 35 minutes on the day of the visit.This includes face to face time and non-face  to face time preparing to see the patient (eg, review of tests), Obtaining and/or reviewing separately obtained history, Documenting clinical information in the electronic or other health record, Independently interpreting resultsand communicating results to the patient/family/caregiver, or Care coordination.

## 2022-11-09 ENCOUNTER — OFFICE VISIT (OUTPATIENT)
Dept: SURGERY | Facility: CLINIC | Age: 47
End: 2022-11-09
Payer: COMMERCIAL

## 2022-11-09 DIAGNOSIS — Z98.890 POST-OPERATIVE STATE: Primary | ICD-10-CM

## 2022-11-09 PROCEDURE — 99024 POSTOP FOLLOW-UP VISIT: CPT | Mod: S$GLB,,, | Performed by: SURGERY

## 2022-11-09 PROCEDURE — 1160F PR REVIEW ALL MEDS BY PRESCRIBER/CLIN PHARMACIST DOCUMENTED: ICD-10-PCS | Mod: CPTII,S$GLB,, | Performed by: SURGERY

## 2022-11-09 PROCEDURE — 4010F ACE/ARB THERAPY RXD/TAKEN: CPT | Mod: CPTII,S$GLB,, | Performed by: SURGERY

## 2022-11-09 PROCEDURE — 1159F MED LIST DOCD IN RCRD: CPT | Mod: CPTII,S$GLB,, | Performed by: SURGERY

## 2022-11-09 PROCEDURE — 99024 PR POST-OP FOLLOW-UP VISIT: ICD-10-PCS | Mod: S$GLB,,, | Performed by: SURGERY

## 2022-11-09 PROCEDURE — 4010F PR ACE/ARB THEARPY RXD/TAKEN: ICD-10-PCS | Mod: CPTII,S$GLB,, | Performed by: SURGERY

## 2022-11-09 PROCEDURE — 1159F PR MEDICATION LIST DOCUMENTED IN MEDICAL RECORD: ICD-10-PCS | Mod: CPTII,S$GLB,, | Performed by: SURGERY

## 2022-11-09 PROCEDURE — 1160F RVW MEDS BY RX/DR IN RCRD: CPT | Mod: CPTII,S$GLB,, | Performed by: SURGERY

## 2022-11-09 NOTE — PROGRESS NOTES
HPI:  Postop revisit status post right subclavian venous access port for chemotherapy.  Port has been used and functioned appropriately.    PHYSICAL EXAM:  Incisions are healing well.  All sutures are removed.  Steri-Strips remain intact  ASSESSMENT:    Stable status post right subclavian venous access port  PLAN:      Revisit as needed

## 2022-11-17 ENCOUNTER — OFFICE VISIT (OUTPATIENT)
Dept: HEMATOLOGY/ONCOLOGY | Facility: CLINIC | Age: 47
End: 2022-11-17
Payer: COMMERCIAL

## 2022-11-17 VITALS
HEART RATE: 83 BPM | TEMPERATURE: 98 F | BODY MASS INDEX: 28.92 KG/M2 | WEIGHT: 153.19 LBS | SYSTOLIC BLOOD PRESSURE: 154 MMHG | OXYGEN SATURATION: 99 % | HEIGHT: 61 IN | DIASTOLIC BLOOD PRESSURE: 92 MMHG | RESPIRATION RATE: 16 BRPM

## 2022-11-17 DIAGNOSIS — C50.919 TRIPLE NEGATIVE BREAST CANCER: Primary | ICD-10-CM

## 2022-11-17 PROCEDURE — 4010F PR ACE/ARB THEARPY RXD/TAKEN: ICD-10-PCS | Mod: CPTII,S$GLB,, | Performed by: NURSE PRACTITIONER

## 2022-11-17 PROCEDURE — 3080F PR MOST RECENT DIASTOLIC BLOOD PRESSURE >= 90 MM HG: ICD-10-PCS | Mod: CPTII,S$GLB,, | Performed by: NURSE PRACTITIONER

## 2022-11-17 PROCEDURE — 4010F ACE/ARB THERAPY RXD/TAKEN: CPT | Mod: CPTII,S$GLB,, | Performed by: NURSE PRACTITIONER

## 2022-11-17 PROCEDURE — 99214 PR OFFICE/OUTPT VISIT, EST, LEVL IV, 30-39 MIN: ICD-10-PCS | Mod: S$GLB,,, | Performed by: NURSE PRACTITIONER

## 2022-11-17 PROCEDURE — 1159F PR MEDICATION LIST DOCUMENTED IN MEDICAL RECORD: ICD-10-PCS | Mod: CPTII,S$GLB,, | Performed by: NURSE PRACTITIONER

## 2022-11-17 PROCEDURE — 1160F PR REVIEW ALL MEDS BY PRESCRIBER/CLIN PHARMACIST DOCUMENTED: ICD-10-PCS | Mod: CPTII,S$GLB,, | Performed by: NURSE PRACTITIONER

## 2022-11-17 PROCEDURE — 3008F PR BODY MASS INDEX (BMI) DOCUMENTED: ICD-10-PCS | Mod: CPTII,S$GLB,, | Performed by: NURSE PRACTITIONER

## 2022-11-17 PROCEDURE — 3080F DIAST BP >= 90 MM HG: CPT | Mod: CPTII,S$GLB,, | Performed by: NURSE PRACTITIONER

## 2022-11-17 PROCEDURE — 3077F PR MOST RECENT SYSTOLIC BLOOD PRESSURE >= 140 MM HG: ICD-10-PCS | Mod: CPTII,S$GLB,, | Performed by: NURSE PRACTITIONER

## 2022-11-17 PROCEDURE — 3077F SYST BP >= 140 MM HG: CPT | Mod: CPTII,S$GLB,, | Performed by: NURSE PRACTITIONER

## 2022-11-17 PROCEDURE — 1160F RVW MEDS BY RX/DR IN RCRD: CPT | Mod: CPTII,S$GLB,, | Performed by: NURSE PRACTITIONER

## 2022-11-17 PROCEDURE — 99214 OFFICE O/P EST MOD 30 MIN: CPT | Mod: S$GLB,,, | Performed by: NURSE PRACTITIONER

## 2022-11-17 PROCEDURE — 1159F MED LIST DOCD IN RCRD: CPT | Mod: CPTII,S$GLB,, | Performed by: NURSE PRACTITIONER

## 2022-11-17 PROCEDURE — 3008F BODY MASS INDEX DOCD: CPT | Mod: CPTII,S$GLB,, | Performed by: NURSE PRACTITIONER

## 2022-11-17 RX ORDER — HEPARIN 100 UNIT/ML
500 SYRINGE INTRAVENOUS
Status: CANCELLED | OUTPATIENT
Start: 2022-11-17

## 2022-11-17 RX ORDER — DOXORUBICIN HYDROCHLORIDE 2 MG/ML
60 INJECTION, SOLUTION INTRAVENOUS
Status: CANCELLED | OUTPATIENT
Start: 2022-11-17

## 2022-11-17 RX ORDER — SODIUM CHLORIDE 0.9 % (FLUSH) 0.9 %
10 SYRINGE (ML) INJECTION
Status: CANCELLED | OUTPATIENT
Start: 2022-11-17

## 2022-11-17 NOTE — PROGRESS NOTES
MEDICAL ONCOLOGY INITIAL CONSULTATION NOTE    Patient ID: Tequila Hernandez is a 46 y.o. female.    Chief Complaint:  Breast cancer    HPI:  Patient is a 46-year-old female with past medical history of diabetes mellitus, hypertension with recent diagnosis of triple negative breast cancer for which she underwent left breast partial mastectomy and sentinel lymph node biopsy.  Patient reports recovering well from recent surgery and presents to our clinic today for further evaluation.  Voices no acute complaints.  She also has undergone genetic testing with surgery does of her young age and triple negative status.      Pathology:     10/7/22:  1. LEFT AXILLARY SENTINEL LYMPH NODE, EXCISIONAL BIOPSY:   - BENIGN REACTIVE SENTINEL LYMPH NODE EXAMINED, AND IT IS NEGATIVE FOR   METASTATIC CARCINOMA (0/1).   2. LEFT BREAST, LEFT SIMPLE MASTECTOMY:   - RESIDUAL INFILTRATING DUCTAL CARCINOMA, BOO GRADE 3, SIZE 13 MM, ASSOCIATED WITH PRIOR    BIOPSY SCAR/SEED MARKER, MARGINS UNINVOLVED IN THIS SPECIMEN, SEE TUMOR SUMMARY.   - NO EVIDENCE OF DCIS.   - NONPROLIFERATIVE FIBROCYSTIC CHANGES AND PSEUDOANGIOMATOUS   HYPERPLASIA(PASH) PRESENT      COMPRISING A GROSSLY EVIDENT 8 MM NODULE AS WELL INCIDENTAL SMALL   FIBROADENOMA..   3. BREAST, ADDITIONAL SUPERIOR MARGIN, MARGIN EXCISION:   - BENIGN MAMMARY TISSUE AND ADIPOSE TISSUE, NEGATIVE FOR TUMOR, MARGIN   CLEAR.   4. LEFT BREAST, ADDITIONAL MEDIAL MARGIN, MARGIN EXCISION:   - BENIGN MAMMARY TISSUE AND ADIPOSE TISSUE, NEGATIVE FOR TUMOR, MARGIN   CLEAR.   LEFT  BREAST TUMOR SUMMARY(INCORPORATES PARTS 1 TO 4):   SPECIMEN TYPE AND PROCEDURE:      Simple mastectomy with additional margin   excisions and sentinel node biopsy.   SPECIMEN LATERALITY:         Left.   TUMOR SITE (QUADRANT):         Not specified.   SPECIMEN INTEGRITY:         Intact    HISTOLOGIC TYPE:         Infiltrating ductal carcinoma.   COMBINED HISTOLOGIC GRADE:      Boo Grade 3   BOO HISTOLOGIC  "SCORE:      3,3,,3= total score 9 of 9    SIZE OF INVASIVE COMPONENT:      13 mm   TUMOR FOCALITY:         Unifocal.   TUMOR NECROSIS:         Not identified.   LYMPHOVASCULAR INVASION:      Not identified.   PERINEURAL INVASION:         Not identified   SKIN:               Not applicable (Skin is present and uninvolved.)   NIPPLE:               Not applicable (Nipple is not present.)   SIZE/TYPE/EXTENT INTRADUCTAL CANCER:  Not identified.        EXTENSIVE INTRADUCTAL COMPONENT (EIC): Not applicable (No DCIS).        NECROSIS:            Not applicable.   MICROCALCIFICATION:         Not identified   SURGICAL MARGINS:             INVASIVE:      Negative                IN SITU:      Not applicable.   DISTANCE TO CLOSEST MARGIN:      Invasive carcinoma extends to 3.5 mm of   superior margin.   LYMPH NODES:                 # TOTAL LYMPH NODES EXAMINED:   1  (1 sentinel in part 1)                 # SENTINEL NODES EXAMINED:   1                 # NODES WITH MACROMETASTASIS:   0                 # NODES WITH MICROMETASTASIS:   0    METASTASIS:            Not applicable.   TREATMENT EFFECT:         No known prior therapy.   TNM CODE:            pT1c  pN0(sn)  M-n/a  G3   ANCILLARY STUDIES: BREAST PROGNOSTIC PANEL: Per prior biopsy [id number], the   tumor is ER-, AL-, HER2 negative ("Triple Negative").   ___________________________________________________________________________    Imaging:     Past Medical History:   Diagnosis Date    Depression     Diabetes mellitus     DM (diabetes mellitus), type 2     Elderly multigravida     Hypertension      Family History   Problem Relation Age of Onset    Hypertension Mother     Diabetes Mother     Hyperlipidemia Mother     Hyperthyroidism Mother     Stroke Maternal Uncle     No Known Problems Father     No Known Problems Sister     No Known Problems Brother     No Known Problems Maternal Grandmother     No Known Problems Maternal Grandfather     No Known Problems Paternal Grandmother  "    No Known Problems Paternal Grandfather      Social History     Socioeconomic History    Marital status:    Tobacco Use    Smoking status: Never    Smokeless tobacco: Never   Substance and Sexual Activity    Alcohol use: Not Currently    Drug use: Never    Sexual activity: Yes     Partners: Male     Birth control/protection: None     Comment: history of IUD     Past Surgical History:   Procedure Laterality Date    LEFT OOPHORECTOMY Left 2012    left ovary removed         Review of systems:  Review of Systems   Constitutional:  Negative for activity change, appetite change, chills, diaphoresis, fatigue and unexpected weight change.   HENT:  Negative for congestion, facial swelling, hearing loss, mouth sores, trouble swallowing and voice change.    Eyes:  Negative for photophobia, pain, discharge and itching.   Respiratory:  Negative for apnea, cough, choking, chest tightness and shortness of breath.    Cardiovascular:  Negative for chest pain, palpitations and leg swelling.   Gastrointestinal:  Negative for abdominal distention, abdominal pain, anal bleeding and blood in stool.   Endocrine: Negative for cold intolerance, heat intolerance, polydipsia and polyphagia.   Genitourinary:  Negative for difficulty urinating, dysuria, flank pain and hematuria.   Musculoskeletal:  Negative for arthralgias, back pain, joint swelling, myalgias, neck pain and neck stiffness.        Positive for breast mass. See HPI   Skin:  Negative for color change, pallor and wound.   Allergic/Immunologic: Negative for environmental allergies, food allergies and immunocompromised state.   Neurological:  Negative for dizziness, seizures, facial asymmetry, speech difficulty, light-headedness, numbness and headaches.   Hematological:  Negative for adenopathy. Does not bruise/bleed easily.   Psychiatric/Behavioral:  Negative for agitation, behavioral problems, confusion, decreased concentration and sleep disturbance.        Physical  Exam  Vitals and nursing note reviewed.   Constitutional:       General: She is not in acute distress.     Appearance: Normal appearance. She is not ill-appearing.   HENT:      Head: Normocephalic and atraumatic.      Nose: No congestion or rhinorrhea.   Eyes:      General: No scleral icterus.     Extraocular Movements: Extraocular movements intact.      Pupils: Pupils are equal, round, and reactive to light.   Cardiovascular:      Rate and Rhythm: Normal rate and regular rhythm.      Pulses: Normal pulses.      Heart sounds: Normal heart sounds. No murmur heard.    No gallop.   Pulmonary:      Effort: Pulmonary effort is normal. No respiratory distress.      Breath sounds: Normal breath sounds. No stridor. No wheezing or rhonchi.   Abdominal:      General: Bowel sounds are normal. There is no distension.      Palpations: There is no mass.      Tenderness: There is no abdominal tenderness. There is no guarding.   Musculoskeletal:         General: No swelling, tenderness, deformity or signs of injury. Normal range of motion.      Cervical back: Normal range of motion and neck supple. No rigidity. No muscular tenderness.      Right lower leg: No edema.      Left lower leg: No edema.   Skin:     General: Skin is warm.      Coloration: Skin is not jaundiced or pale.      Findings: No bruising or lesion.      Comments: Status post left breast lumpectomy.  Healing well   Neurological:      General: No focal deficit present.      Mental Status: She is alert and oriented to person, place, and time.      Cranial Nerves: No cranial nerve deficit.      Sensory: No sensory deficit.      Motor: No weakness.      Gait: Gait normal.   Psychiatric:         Mood and Affect: Mood normal.         Behavior: Behavior normal.         Thought Content: Thought content normal.                       Vitals:    11/17/22 0916   BP: (!) 154/92   Pulse: 83   Resp: 16   Temp: 97.7 °F (36.5 °C)   Body surface area is 1.73 meters  squared.    Assessment/Plan:      ECOG 1    Infiltrating ductal carcinoma arising from the left breast in female: Stage IB  == Triple negative.  Status post left breast lumpectomy and sentinel lymph node evaluation  ==  pT1c  pN0(sn)  M-n/a  G3.  Size of invasive component is more than 1 cm (13 mm).  Based on her triple negative status and young age my recommendation would be for adjuvant chemotherapy with dose dense AC x4 followed by weekly paclitaxel for 12 cycles.  Post lumpectomy and after completion of chemotherapy patient will be followed by radiation oncology for evaluation of radiation treatment.   == Patient will also need genetic testing and based on documentation from surgery she is agreeable to that.  == will obtain prior authorization request and obtain echocardiogram prior.  Patient will also need port placement and referral has been made in this regard   10/19/22:  Patient here today to discuss upcoming chemotherapy, side effect profile, risk versus benefits, and expected outcomes have been discussed today. All questions and concerns answered and consents have been signed. Echo planned for tomorrow, port placement TBD. Plan to start treatment on 11/3/22 as she had lumpectomy 10/4/22.   11/3/22:  Status post port placement on Tuesday with Dr. Huerta, review of echo on 10/22/2022 shows EF> 55%, labs reviewed and patient is cleared to begin cycle 1 dose dense AC with Neulasta support as planned for today  11/17/22:  Tolerated cycle 1 of ddAC very well with no complaints other than mild nausea x1 day.  Patient was able to take Phenergan without any lingering effects.  Labs reviewed and patient is cleared for for today review of recent genetic testing showing no mutation identified.  Plan:  Okay to proceed with cycle 2 dose dense AC  Genetic testing completed with Dr Huerta NEGATIVE   RTC in 2 weeks with cbc cmp upt     Total time spent in counseling and discussion about further management options including  relevant lab work, treatment,  prognosis, medications and intended side effects was more than 35 minutes. More than 50% of the time was spent on counseling and coordination of care.  I spent a total of 35 minutes on the day of the visit.This includes face to face time and non-face to face time preparing to see the patient (eg, review of tests), Obtaining and/or reviewing separately obtained history, Documenting clinical information in the electronic or other health record, Independently interpreting resultsand communicating results to the patient/family/caregiver, or Care coordination.

## 2022-11-29 DIAGNOSIS — C50.919 TRIPLE NEGATIVE BREAST CANCER: Primary | ICD-10-CM

## 2022-11-30 LAB
ALBUMIN SERPL BCP-MCNC: 4.1 G/DL (ref 3.4–5)
ALBUMIN/GLOBULIN RATIO: 1.37 RATIO (ref 1.1–1.8)
ALP SERPL-CCNC: 114 U/L (ref 46–116)
ALT SERPL W P-5'-P-CCNC: 31 U/L (ref 12–78)
ANION GAP SERPL CALC-SCNC: 6 MMOL/L (ref 3–11)
AST SERPL-CCNC: 21 U/L (ref 15–37)
B-HCG UR QL: NEGATIVE
BANDS: 15 % (ref 0–5)
BILIRUB SERPL-MCNC: 0.3 MG/DL (ref 0–1)
BUN SERPL-MCNC: 9 MG/DL (ref 7–18)
BUN/CREAT SERPL: 15.78 RATIO (ref 7–18)
CALCIUM SERPL-MCNC: 8.4 MG/DL (ref 8.8–10.5)
CELLS COUNTED: 100
CHLORIDE SERPL-SCNC: 105 MMOL/L (ref 100–108)
CO2 SERPL-SCNC: 27 MMOL/L (ref 21–32)
CREAT SERPL-MCNC: 0.57 MG/DL (ref 0.55–1.02)
ERYTHROCYTE [DISTWIDTH] IN BLOOD BY AUTOMATED COUNT: 13.4 % (ref 12.5–18)
GFR ESTIMATION: > 60
GLOBULIN: 3 G/DL (ref 2.3–3.5)
GLUCOSE SERPL-MCNC: 125 MG/DL (ref 70–110)
HCT VFR BLD AUTO: 35.9 % (ref 37–47)
HGB BLD-MCNC: 12.1 G/DL (ref 12–16)
LYMPHOCYTES NFR BLD: 10 % (ref 25–40)
MCH RBC QN AUTO: 29.7 PG (ref 27–31.2)
MCHC RBC AUTO-ENTMCNC: 33.7 G/DL (ref 31.8–35.4)
MCV RBC AUTO: 88 FL (ref 80–97)
METAMYELOCYTES # BLD MANUAL: 3 % (ref 0–0)
MONOCYTES NFR BLD: 5 % (ref 1–15)
MYELOCYTES: 4 % (ref 0–0)
NEUTROPHILS # BLD AUTO: 20.4 10*3/UL (ref 1.8–7.7)
NEUTROPHILS NFR BLD: 63 % (ref 37–80)
NUCLEATED RED BLOOD CELLS: 0.3 %
PLATELETS: 243 10*3/UL (ref 142–424)
POTASSIUM SERPL-SCNC: 3.4 MMOL/L (ref 3.6–5.2)
PROT SERPL-MCNC: 7.1 G/DL (ref 6.4–8.2)
RBC # BLD AUTO: 4.08 10*6/UL (ref 4.2–5.4)
RBC MORPH BLD: ABNORMAL
SMALL PLATELETS BLD QL SMEAR: ADEQUATE
SODIUM BLD-SCNC: 138 MMOL/L (ref 135–145)
WBC # BLD: 26.2 10*3/UL (ref 4.6–10.2)

## 2022-12-01 ENCOUNTER — OFFICE VISIT (OUTPATIENT)
Dept: HEMATOLOGY/ONCOLOGY | Facility: CLINIC | Age: 47
End: 2022-12-01
Payer: COMMERCIAL

## 2022-12-01 VITALS
DIASTOLIC BLOOD PRESSURE: 95 MMHG | RESPIRATION RATE: 18 BRPM | WEIGHT: 155.31 LBS | SYSTOLIC BLOOD PRESSURE: 175 MMHG | HEART RATE: 85 BPM | HEIGHT: 61 IN | OXYGEN SATURATION: 98 % | BODY MASS INDEX: 29.32 KG/M2

## 2022-12-01 DIAGNOSIS — C50.212 MALIGNANT NEOPLASM OF UPPER-INNER QUADRANT OF LEFT BREAST IN FEMALE, ESTROGEN RECEPTOR POSITIVE: ICD-10-CM

## 2022-12-01 DIAGNOSIS — C50.919 TRIPLE NEGATIVE BREAST CANCER: Primary | ICD-10-CM

## 2022-12-01 DIAGNOSIS — Z17.0 MALIGNANT NEOPLASM OF UPPER-INNER QUADRANT OF LEFT BREAST IN FEMALE, ESTROGEN RECEPTOR POSITIVE: ICD-10-CM

## 2022-12-01 PROCEDURE — 3008F BODY MASS INDEX DOCD: CPT | Mod: CPTII,S$GLB,, | Performed by: NURSE PRACTITIONER

## 2022-12-01 PROCEDURE — 4010F PR ACE/ARB THEARPY RXD/TAKEN: ICD-10-PCS | Mod: CPTII,S$GLB,, | Performed by: NURSE PRACTITIONER

## 2022-12-01 PROCEDURE — 3080F PR MOST RECENT DIASTOLIC BLOOD PRESSURE >= 90 MM HG: ICD-10-PCS | Mod: CPTII,S$GLB,, | Performed by: NURSE PRACTITIONER

## 2022-12-01 PROCEDURE — 3077F SYST BP >= 140 MM HG: CPT | Mod: CPTII,S$GLB,, | Performed by: NURSE PRACTITIONER

## 2022-12-01 PROCEDURE — 1160F RVW MEDS BY RX/DR IN RCRD: CPT | Mod: CPTII,S$GLB,, | Performed by: NURSE PRACTITIONER

## 2022-12-01 PROCEDURE — 1159F PR MEDICATION LIST DOCUMENTED IN MEDICAL RECORD: ICD-10-PCS | Mod: CPTII,S$GLB,, | Performed by: NURSE PRACTITIONER

## 2022-12-01 PROCEDURE — 4010F ACE/ARB THERAPY RXD/TAKEN: CPT | Mod: CPTII,S$GLB,, | Performed by: NURSE PRACTITIONER

## 2022-12-01 PROCEDURE — 3008F PR BODY MASS INDEX (BMI) DOCUMENTED: ICD-10-PCS | Mod: CPTII,S$GLB,, | Performed by: NURSE PRACTITIONER

## 2022-12-01 PROCEDURE — 3077F PR MOST RECENT SYSTOLIC BLOOD PRESSURE >= 140 MM HG: ICD-10-PCS | Mod: CPTII,S$GLB,, | Performed by: NURSE PRACTITIONER

## 2022-12-01 PROCEDURE — 3080F DIAST BP >= 90 MM HG: CPT | Mod: CPTII,S$GLB,, | Performed by: NURSE PRACTITIONER

## 2022-12-01 PROCEDURE — 99214 PR OFFICE/OUTPT VISIT, EST, LEVL IV, 30-39 MIN: ICD-10-PCS | Mod: S$GLB,,, | Performed by: NURSE PRACTITIONER

## 2022-12-01 PROCEDURE — 1160F PR REVIEW ALL MEDS BY PRESCRIBER/CLIN PHARMACIST DOCUMENTED: ICD-10-PCS | Mod: CPTII,S$GLB,, | Performed by: NURSE PRACTITIONER

## 2022-12-01 PROCEDURE — 99214 OFFICE O/P EST MOD 30 MIN: CPT | Mod: S$GLB,,, | Performed by: NURSE PRACTITIONER

## 2022-12-01 PROCEDURE — 1159F MED LIST DOCD IN RCRD: CPT | Mod: CPTII,S$GLB,, | Performed by: NURSE PRACTITIONER

## 2022-12-01 RX ORDER — HEPARIN 100 UNIT/ML
500 SYRINGE INTRAVENOUS
Status: CANCELLED | OUTPATIENT
Start: 2022-12-01

## 2022-12-01 RX ORDER — SODIUM CHLORIDE 0.9 % (FLUSH) 0.9 %
10 SYRINGE (ML) INJECTION
Status: CANCELLED | OUTPATIENT
Start: 2022-12-01

## 2022-12-01 RX ORDER — DOXORUBICIN HYDROCHLORIDE 2 MG/ML
60 INJECTION, SOLUTION INTRAVENOUS
Status: CANCELLED | OUTPATIENT
Start: 2022-12-01

## 2022-12-01 NOTE — PROGRESS NOTES
MEDICAL ONCOLOGY INITIAL CONSULTATION NOTE    Patient ID: Tequila Hernandez is a 46 y.o. female.    Chief Complaint:  Breast cancer    HPI:  Patient is a 46-year-old female with past medical history of diabetes mellitus, hypertension with recent diagnosis of triple negative breast cancer for which she underwent left breast partial mastectomy and sentinel lymph node biopsy.  Patient reports recovering well from recent surgery and presents to our clinic today for further evaluation.  Voices no acute complaints.  She also has undergone genetic testing with surgery does of her young age and triple negative status.      Pathology:     10/7/22:  1. LEFT AXILLARY SENTINEL LYMPH NODE, EXCISIONAL BIOPSY:   - BENIGN REACTIVE SENTINEL LYMPH NODE EXAMINED, AND IT IS NEGATIVE FOR   METASTATIC CARCINOMA (0/1).   2. LEFT BREAST, LEFT SIMPLE MASTECTOMY:   - RESIDUAL INFILTRATING DUCTAL CARCINOMA, BOO GRADE 3, SIZE 13 MM, ASSOCIATED WITH PRIOR    BIOPSY SCAR/SEED MARKER, MARGINS UNINVOLVED IN THIS SPECIMEN, SEE TUMOR SUMMARY.   - NO EVIDENCE OF DCIS.   - NONPROLIFERATIVE FIBROCYSTIC CHANGES AND PSEUDOANGIOMATOUS   HYPERPLASIA(PASH) PRESENT      COMPRISING A GROSSLY EVIDENT 8 MM NODULE AS WELL INCIDENTAL SMALL   FIBROADENOMA..   3. BREAST, ADDITIONAL SUPERIOR MARGIN, MARGIN EXCISION:   - BENIGN MAMMARY TISSUE AND ADIPOSE TISSUE, NEGATIVE FOR TUMOR, MARGIN   CLEAR.   4. LEFT BREAST, ADDITIONAL MEDIAL MARGIN, MARGIN EXCISION:   - BENIGN MAMMARY TISSUE AND ADIPOSE TISSUE, NEGATIVE FOR TUMOR, MARGIN   CLEAR.   LEFT  BREAST TUMOR SUMMARY(INCORPORATES PARTS 1 TO 4):   SPECIMEN TYPE AND PROCEDURE:      Simple mastectomy with additional margin   excisions and sentinel node biopsy.   SPECIMEN LATERALITY:         Left.   TUMOR SITE (QUADRANT):         Not specified.   SPECIMEN INTEGRITY:         Intact    HISTOLOGIC TYPE:         Infiltrating ductal carcinoma.   COMBINED HISTOLOGIC GRADE:      Boo Grade 3   BOO HISTOLOGIC  "SCORE:      3,3,,3= total score 9 of 9    SIZE OF INVASIVE COMPONENT:      13 mm   TUMOR FOCALITY:         Unifocal.   TUMOR NECROSIS:         Not identified.   LYMPHOVASCULAR INVASION:      Not identified.   PERINEURAL INVASION:         Not identified   SKIN:               Not applicable (Skin is present and uninvolved.)   NIPPLE:               Not applicable (Nipple is not present.)   SIZE/TYPE/EXTENT INTRADUCTAL CANCER:  Not identified.        EXTENSIVE INTRADUCTAL COMPONENT (EIC): Not applicable (No DCIS).        NECROSIS:            Not applicable.   MICROCALCIFICATION:         Not identified   SURGICAL MARGINS:             INVASIVE:      Negative                IN SITU:      Not applicable.   DISTANCE TO CLOSEST MARGIN:      Invasive carcinoma extends to 3.5 mm of   superior margin.   LYMPH NODES:                 # TOTAL LYMPH NODES EXAMINED:   1  (1 sentinel in part 1)                 # SENTINEL NODES EXAMINED:   1                 # NODES WITH MACROMETASTASIS:   0                 # NODES WITH MICROMETASTASIS:   0    METASTASIS:            Not applicable.   TREATMENT EFFECT:         No known prior therapy.   TNM CODE:            pT1c  pN0(sn)  M-n/a  G3   ANCILLARY STUDIES: BREAST PROGNOSTIC PANEL: Per prior biopsy [id number], the   tumor is ER-, [place]-, HER2 negative ("Triple Negative").   ___________________________________________________________________________    Imaging:     Past Medical History:   Diagnosis Date    Depression     Diabetes mellitus     DM (diabetes mellitus), type 2     Elderly multigravida     Hypertension      Family History   Problem Relation Age of Onset    Hypertension Mother     Diabetes Mother     Hyperlipidemia Mother     Hyperthyroidism Mother     Stroke Maternal Uncle     No Known Problems Father     No Known Problems Sister     No Known Problems Brother     No Known Problems Maternal Grandmother     No Known Problems Maternal Grandfather     No Known Problems Paternal Grandmother  "    No Known Problems Paternal Grandfather      Social History     Socioeconomic History    Marital status:    Tobacco Use    Smoking status: Never    Smokeless tobacco: Never   Substance and Sexual Activity    Alcohol use: Not Currently    Drug use: Never    Sexual activity: Yes     Partners: Male     Birth control/protection: None     Comment: history of IUD     Past Surgical History:   Procedure Laterality Date    LEFT OOPHORECTOMY Left 2012    left ovary removed         Review of systems:  Review of Systems   Constitutional:  Negative for activity change, appetite change, chills, diaphoresis, fatigue and unexpected weight change.   HENT:  Negative for congestion, facial swelling, hearing loss, mouth sores, trouble swallowing and voice change.    Eyes:  Negative for photophobia, pain, discharge and itching.   Respiratory:  Negative for apnea, cough, choking, chest tightness and shortness of breath.    Cardiovascular:  Negative for chest pain, palpitations and leg swelling.   Gastrointestinal:  Positive for nausea. Negative for abdominal distention, abdominal pain, anal bleeding and blood in stool.   Endocrine: Negative for cold intolerance, heat intolerance, polydipsia and polyphagia.   Genitourinary:  Negative for difficulty urinating, dysuria, flank pain and hematuria.   Musculoskeletal:  Negative for arthralgias, back pain, joint swelling, myalgias, neck pain and neck stiffness.        Positive for breast mass. See HPI   Skin:  Negative for color change, pallor and wound.   Allergic/Immunologic: Negative for environmental allergies, food allergies and immunocompromised state.   Neurological:  Negative for dizziness, seizures, facial asymmetry, speech difficulty, light-headedness, numbness and headaches.   Hematological:  Negative for adenopathy. Does not bruise/bleed easily.   Psychiatric/Behavioral:  Negative for agitation, behavioral problems, confusion, decreased concentration and sleep disturbance.         Physical Exam  Vitals and nursing note reviewed.   Constitutional:       General: She is not in acute distress.     Appearance: Normal appearance. She is not ill-appearing.   HENT:      Head: Normocephalic and atraumatic.      Nose: No congestion or rhinorrhea.   Eyes:      General: No scleral icterus.     Extraocular Movements: Extraocular movements intact.      Pupils: Pupils are equal, round, and reactive to light.   Cardiovascular:      Rate and Rhythm: Normal rate and regular rhythm.      Pulses: Normal pulses.      Heart sounds: Normal heart sounds. No murmur heard.    No gallop.   Pulmonary:      Effort: Pulmonary effort is normal. No respiratory distress.      Breath sounds: Normal breath sounds. No stridor. No wheezing or rhonchi.   Abdominal:      General: Bowel sounds are normal. There is no distension.      Palpations: There is no mass.      Tenderness: There is no abdominal tenderness. There is no guarding.   Musculoskeletal:         General: No swelling, tenderness, deformity or signs of injury. Normal range of motion.      Cervical back: Normal range of motion and neck supple. No rigidity. No muscular tenderness.      Right lower leg: No edema.      Left lower leg: No edema.   Skin:     General: Skin is warm.      Coloration: Skin is not jaundiced or pale.      Findings: No bruising or lesion.      Comments: Status post left breast lumpectomy.  Healing well   Neurological:      General: No focal deficit present.      Mental Status: She is alert and oriented to person, place, and time.      Cranial Nerves: No cranial nerve deficit.      Sensory: No sensory deficit.      Motor: No weakness.      Gait: Gait normal.   Psychiatric:         Mood and Affect: Mood normal.         Behavior: Behavior normal.         Thought Content: Thought content normal.                       Vitals:    12/01/22 0810   BP: (!) 175/95   Pulse: 85   Resp: 18   Body surface area is 1.74 meters squared.    Assessment/Plan:       ECOG 1    Infiltrating ductal carcinoma arising from the left breast in female: Stage IB  == Triple negative.  Status post left breast lumpectomy and sentinel lymph node evaluation  ==  pT1c  pN0(sn)  M-n/a  G3.  Size of invasive component is more than 1 cm (13 mm).  Based on her triple negative status and young age my recommendation would be for adjuvant chemotherapy with dose dense AC x4 followed by weekly paclitaxel for 12 cycles.  Post lumpectomy and after completion of chemotherapy patient will be followed by radiation oncology for evaluation of radiation treatment.   == Patient will also need genetic testing and based on documentation from surgery she is agreeable to that.  == will obtain prior authorization request and obtain echocardiogram prior.  Patient will also need port placement and referral has been made in this regard   10/19/22:  Patient here today to discuss upcoming chemotherapy, side effect profile, risk versus benefits, and expected outcomes have been discussed today. All questions and concerns answered and consents have been signed. Echo planned for tomorrow, port placement TBD. Plan to start treatment on 11/3/22 as she had lumpectomy 10/4/22.   11/3/22:  Status post port placement on Tuesday with Dr. Huerta, review of echo on 10/22/2022 shows EF> 55%, labs reviewed and patient is cleared to begin cycle 1 dose dense AC with Neulasta support as planned for today  11/17/22:  Tolerated cycle 1 of ddAC very well with no complaints other than mild nausea x1 day.  Patient was able to take Phenergan without any lingering effects.  Labs reviewed and patient is cleared for for today review of recent genetic testing showing no mutation identified.  12/1/22:  Tolerated cycle 2 dose dense AC fairly well with complaints of prolonged nausea which was relieved with Phenergan and Zofran.  Patient is still able to work and is feeling good today.  Labs reviewed and patient is cleared for cycle 3 treatment as  planned for today    Plan:  Okay to proceed with cycle 3 dose dense AC  Genetic testing completed with Dr Huerta NEGATIVE   RTC in 2 weeks with cbc cmp upt     Total time spent in counseling and discussion about further management options including relevant lab work, treatment,  prognosis, medications and intended side effects was more than 35 minutes. More than 50% of the time was spent on counseling and coordination of care.  I spent a total of 35 minutes on the day of the visit.This includes face to face time and non-face to face time preparing to see the patient (eg, review of tests), Obtaining and/or reviewing separately obtained history, Documenting clinical information in the electronic or other health record, Independently interpreting resultsand communicating results to the patient/family/caregiver, or Care coordination.

## 2022-12-13 DIAGNOSIS — C50.919 TRIPLE NEGATIVE BREAST CANCER: Primary | ICD-10-CM

## 2022-12-14 LAB — B-HCG UR QL: NEGATIVE

## 2022-12-15 ENCOUNTER — OFFICE VISIT (OUTPATIENT)
Dept: HEMATOLOGY/ONCOLOGY | Facility: CLINIC | Age: 47
End: 2022-12-15
Payer: COMMERCIAL

## 2022-12-15 VITALS
TEMPERATURE: 99 F | WEIGHT: 152 LBS | OXYGEN SATURATION: 98 % | BODY MASS INDEX: 28.7 KG/M2 | SYSTOLIC BLOOD PRESSURE: 147 MMHG | DIASTOLIC BLOOD PRESSURE: 87 MMHG | HEART RATE: 81 BPM | HEIGHT: 61 IN | RESPIRATION RATE: 16 BRPM

## 2022-12-15 DIAGNOSIS — C50.919 TRIPLE NEGATIVE BREAST CANCER: Primary | ICD-10-CM

## 2022-12-15 PROCEDURE — 3008F PR BODY MASS INDEX (BMI) DOCUMENTED: ICD-10-PCS | Mod: CPTII,S$GLB,, | Performed by: NURSE PRACTITIONER

## 2022-12-15 PROCEDURE — 1159F MED LIST DOCD IN RCRD: CPT | Mod: CPTII,S$GLB,, | Performed by: NURSE PRACTITIONER

## 2022-12-15 PROCEDURE — 4010F ACE/ARB THERAPY RXD/TAKEN: CPT | Mod: CPTII,S$GLB,, | Performed by: NURSE PRACTITIONER

## 2022-12-15 PROCEDURE — 1160F RVW MEDS BY RX/DR IN RCRD: CPT | Mod: CPTII,S$GLB,, | Performed by: NURSE PRACTITIONER

## 2022-12-15 PROCEDURE — 3008F BODY MASS INDEX DOCD: CPT | Mod: CPTII,S$GLB,, | Performed by: NURSE PRACTITIONER

## 2022-12-15 PROCEDURE — 99214 OFFICE O/P EST MOD 30 MIN: CPT | Mod: S$GLB,,, | Performed by: NURSE PRACTITIONER

## 2022-12-15 PROCEDURE — 4010F PR ACE/ARB THEARPY RXD/TAKEN: ICD-10-PCS | Mod: CPTII,S$GLB,, | Performed by: NURSE PRACTITIONER

## 2022-12-15 PROCEDURE — 3077F SYST BP >= 140 MM HG: CPT | Mod: CPTII,S$GLB,, | Performed by: NURSE PRACTITIONER

## 2022-12-15 PROCEDURE — 3077F PR MOST RECENT SYSTOLIC BLOOD PRESSURE >= 140 MM HG: ICD-10-PCS | Mod: CPTII,S$GLB,, | Performed by: NURSE PRACTITIONER

## 2022-12-15 PROCEDURE — 1160F PR REVIEW ALL MEDS BY PRESCRIBER/CLIN PHARMACIST DOCUMENTED: ICD-10-PCS | Mod: CPTII,S$GLB,, | Performed by: NURSE PRACTITIONER

## 2022-12-15 PROCEDURE — 1159F PR MEDICATION LIST DOCUMENTED IN MEDICAL RECORD: ICD-10-PCS | Mod: CPTII,S$GLB,, | Performed by: NURSE PRACTITIONER

## 2022-12-15 PROCEDURE — 3079F DIAST BP 80-89 MM HG: CPT | Mod: CPTII,S$GLB,, | Performed by: NURSE PRACTITIONER

## 2022-12-15 PROCEDURE — 3079F PR MOST RECENT DIASTOLIC BLOOD PRESSURE 80-89 MM HG: ICD-10-PCS | Mod: CPTII,S$GLB,, | Performed by: NURSE PRACTITIONER

## 2022-12-15 PROCEDURE — 99214 PR OFFICE/OUTPT VISIT, EST, LEVL IV, 30-39 MIN: ICD-10-PCS | Mod: S$GLB,,, | Performed by: NURSE PRACTITIONER

## 2022-12-15 RX ORDER — DOXORUBICIN HYDROCHLORIDE 2 MG/ML
60 INJECTION, SOLUTION INTRAVENOUS
Status: CANCELLED | OUTPATIENT
Start: 2022-12-15

## 2022-12-15 RX ORDER — HEPARIN 100 UNIT/ML
500 SYRINGE INTRAVENOUS
Status: CANCELLED | OUTPATIENT
Start: 2022-12-15

## 2022-12-15 RX ORDER — PROCHLORPERAZINE MALEATE 10 MG
10 TABLET ORAL 3 TIMES DAILY
Qty: 90 TABLET | Refills: 1 | Status: SHIPPED | OUTPATIENT
Start: 2022-12-15 | End: 2023-12-15

## 2022-12-15 RX ORDER — SODIUM CHLORIDE 0.9 % (FLUSH) 0.9 %
10 SYRINGE (ML) INJECTION
Status: CANCELLED | OUTPATIENT
Start: 2022-12-15

## 2022-12-15 NOTE — PROGRESS NOTES
MEDICAL ONCOLOGY INITIAL CONSULTATION NOTE    Patient ID: Tequila Hernandez is a 47 y.o. female.    Chief Complaint:  Breast cancer    HPI:  Patient is a 46-year-old female with past medical history of diabetes mellitus, hypertension with recent diagnosis of triple negative breast cancer for which she underwent left breast partial mastectomy and sentinel lymph node biopsy.  Patient reports recovering well from recent surgery and presents to our clinic today for further evaluation.  Voices no acute complaints.  She also has undergone genetic testing with surgery does of her young age and triple negative status.      Pathology:     10/7/22:  1. LEFT AXILLARY SENTINEL LYMPH NODE, EXCISIONAL BIOPSY:   - BENIGN REACTIVE SENTINEL LYMPH NODE EXAMINED, AND IT IS NEGATIVE FOR   METASTATIC CARCINOMA (0/1).   2. LEFT BREAST, LEFT SIMPLE MASTECTOMY:   - RESIDUAL INFILTRATING DUCTAL CARCINOMA, BOO GRADE 3, SIZE 13 MM, ASSOCIATED WITH PRIOR    BIOPSY SCAR/SEED MARKER, MARGINS UNINVOLVED IN THIS SPECIMEN, SEE TUMOR SUMMARY.   - NO EVIDENCE OF DCIS.   - NONPROLIFERATIVE FIBROCYSTIC CHANGES AND PSEUDOANGIOMATOUS   HYPERPLASIA(PASH) PRESENT      COMPRISING A GROSSLY EVIDENT 8 MM NODULE AS WELL INCIDENTAL SMALL   FIBROADENOMA..   3. BREAST, ADDITIONAL SUPERIOR MARGIN, MARGIN EXCISION:   - BENIGN MAMMARY TISSUE AND ADIPOSE TISSUE, NEGATIVE FOR TUMOR, MARGIN   CLEAR.   4. LEFT BREAST, ADDITIONAL MEDIAL MARGIN, MARGIN EXCISION:   - BENIGN MAMMARY TISSUE AND ADIPOSE TISSUE, NEGATIVE FOR TUMOR, MARGIN   CLEAR.   LEFT  BREAST TUMOR SUMMARY(INCORPORATES PARTS 1 TO 4):   SPECIMEN TYPE AND PROCEDURE:      Simple mastectomy with additional margin   excisions and sentinel node biopsy.   SPECIMEN LATERALITY:         Left.   TUMOR SITE (QUADRANT):         Not specified.   SPECIMEN INTEGRITY:         Intact    HISTOLOGIC TYPE:         Infiltrating ductal carcinoma.   COMBINED HISTOLOGIC GRADE:      Boo Grade 3   BOO HISTOLOGIC  "SCORE:      3,3,,3= total score 9 of 9    SIZE OF INVASIVE COMPONENT:      13 mm   TUMOR FOCALITY:         Unifocal.   TUMOR NECROSIS:         Not identified.   LYMPHOVASCULAR INVASION:      Not identified.   PERINEURAL INVASION:         Not identified   SKIN:               Not applicable (Skin is present and uninvolved.)   NIPPLE:               Not applicable (Nipple is not present.)   SIZE/TYPE/EXTENT INTRADUCTAL CANCER:  Not identified.        EXTENSIVE INTRADUCTAL COMPONENT (EIC): Not applicable (No DCIS).        NECROSIS:            Not applicable.   MICROCALCIFICATION:         Not identified   SURGICAL MARGINS:             INVASIVE:      Negative                IN SITU:      Not applicable.   DISTANCE TO CLOSEST MARGIN:      Invasive carcinoma extends to 3.5 mm of   superior margin.   LYMPH NODES:                 # TOTAL LYMPH NODES EXAMINED:   1  (1 sentinel in part 1)                 # SENTINEL NODES EXAMINED:   1                 # NODES WITH MACROMETASTASIS:   0                 # NODES WITH MICROMETASTASIS:   0    METASTASIS:            Not applicable.   TREATMENT EFFECT:         No known prior therapy.   TNM CODE:            pT1c  pN0(sn)  M-n/a  G3   ANCILLARY STUDIES: BREAST PROGNOSTIC PANEL: Per prior biopsy [id number], the   tumor is ER-, [place]-, HER2 negative ("Triple Negative").   ___________________________________________________________________________    Imaging:     Past Medical History:   Diagnosis Date    Depression     Diabetes mellitus     DM (diabetes mellitus), type 2     Elderly multigravida     Hypertension      Family History   Problem Relation Age of Onset    Hypertension Mother     Diabetes Mother     Hyperlipidemia Mother     Hyperthyroidism Mother     Stroke Maternal Uncle     No Known Problems Father     No Known Problems Sister     No Known Problems Brother     No Known Problems Maternal Grandmother     No Known Problems Maternal Grandfather     No Known Problems Paternal Grandmother  "    No Known Problems Paternal Grandfather      Social History     Socioeconomic History    Marital status:    Tobacco Use    Smoking status: Never    Smokeless tobacco: Never   Substance and Sexual Activity    Alcohol use: Not Currently    Drug use: Never    Sexual activity: Yes     Partners: Male     Birth control/protection: None     Comment: history of IUD     Past Surgical History:   Procedure Laterality Date    LEFT OOPHORECTOMY Left 2012    left ovary removed         Review of systems:  Review of Systems   Constitutional:  Negative for activity change, appetite change, chills, diaphoresis, fatigue and unexpected weight change.   HENT:  Negative for congestion, facial swelling, hearing loss, mouth sores, trouble swallowing and voice change.    Eyes:  Negative for photophobia, pain, discharge and itching.   Respiratory:  Negative for apnea, cough, choking, chest tightness and shortness of breath.    Cardiovascular:  Negative for chest pain, palpitations and leg swelling.   Gastrointestinal:  Positive for nausea. Negative for abdominal distention, abdominal pain, anal bleeding and blood in stool.   Endocrine: Negative for cold intolerance, heat intolerance, polydipsia and polyphagia.   Genitourinary:  Negative for difficulty urinating, dysuria, flank pain and hematuria.   Musculoskeletal:  Negative for arthralgias, back pain, joint swelling, myalgias, neck pain and neck stiffness.        Positive for breast mass. See HPI   Skin:  Negative for color change, pallor and wound.   Allergic/Immunologic: Negative for environmental allergies, food allergies and immunocompromised state.   Neurological:  Negative for dizziness, seizures, facial asymmetry, speech difficulty, light-headedness, numbness and headaches.   Hematological:  Negative for adenopathy. Does not bruise/bleed easily.   Psychiatric/Behavioral:  Negative for agitation, behavioral problems, confusion, decreased concentration and sleep disturbance.         Physical Exam  Vitals and nursing note reviewed.   Constitutional:       General: She is not in acute distress.     Appearance: Normal appearance. She is not ill-appearing.   HENT:      Head: Normocephalic and atraumatic.      Nose: No congestion or rhinorrhea.   Eyes:      General: No scleral icterus.     Extraocular Movements: Extraocular movements intact.      Pupils: Pupils are equal, round, and reactive to light.   Cardiovascular:      Rate and Rhythm: Normal rate and regular rhythm.      Pulses: Normal pulses.      Heart sounds: Normal heart sounds. No murmur heard.    No gallop.   Pulmonary:      Effort: Pulmonary effort is normal. No respiratory distress.      Breath sounds: Normal breath sounds. No stridor. No wheezing or rhonchi.   Abdominal:      General: Bowel sounds are normal. There is no distension.      Palpations: There is no mass.      Tenderness: There is no abdominal tenderness. There is no guarding.   Musculoskeletal:         General: No swelling, tenderness, deformity or signs of injury. Normal range of motion.      Cervical back: Normal range of motion and neck supple. No rigidity. No muscular tenderness.      Right lower leg: No edema.      Left lower leg: No edema.   Skin:     General: Skin is warm.      Coloration: Skin is not jaundiced or pale.      Findings: No bruising or lesion.      Comments: Status post left breast lumpectomy.  Healing well   Neurological:      General: No focal deficit present.      Mental Status: She is alert and oriented to person, place, and time.      Cranial Nerves: No cranial nerve deficit.      Sensory: No sensory deficit.      Motor: No weakness.      Gait: Gait normal.   Psychiatric:         Mood and Affect: Mood normal.         Behavior: Behavior normal.         Thought Content: Thought content normal.                       Vitals:    12/15/22 0812   BP: (!) 147/87   Pulse: 81   Resp: 16   Temp: 99.3 °F (37.4 °C)   Body surface area is 1.72 meters  squared.    Assessment/Plan:      ECOG 1    Infiltrating ductal carcinoma arising from the left breast in female: Stage IB  == Triple negative.  Status post left breast lumpectomy and sentinel lymph node evaluation  ==  pT1c  pN0(sn)  M-n/a  G3.  Size of invasive component is more than 1 cm (13 mm).  Based on her triple negative status and young age my recommendation would be for adjuvant chemotherapy with dose dense AC x4 followed by weekly paclitaxel for 12 cycles.  Post lumpectomy and after completion of chemotherapy patient will be followed by radiation oncology for evaluation of radiation treatment.   == Patient will also need genetic testing and based on documentation from surgery she is agreeable to that.  == will obtain prior authorization request and obtain echocardiogram prior.  Patient will also need port placement and referral has been made in this regard   10/19/22:  Patient here today to discuss upcoming chemotherapy, side effect profile, risk versus benefits, and expected outcomes have been discussed today. All questions and concerns answered and consents have been signed. Echo planned for tomorrow, port placement TBD. Plan to start treatment on 11/3/22 as she had lumpectomy 10/4/22.   11/3/22:  Status post port placement on Tuesday with Dr. Huerta, review of echo on 10/22/2022 shows EF> 55%, labs reviewed and patient is cleared to begin cycle 1 dose dense AC with Neulasta support as planned for today  11/17/22:  Tolerated cycle 1 of ddAC very well with no complaints other than mild nausea x1 day.  Patient was able to take Phenergan without any lingering effects.  Labs reviewed and patient is cleared for for today review of recent genetic testing showing no mutation identified.  12/15/22:  Tolerated cycle 3 dose dense AC fairly well with complaints of prolonged nausea which was not relieved with Phenergan and Zofran. Will send out compazine.  Patient is still able to work and is feeling good today.   Labs reviewed and patient is cleared for cycle 4 of 4 AC treatment as planned for today    Plan:  Okay to proceed with cycle 4 dose dense AC  Genetic testing completed with Dr Bradley YOUSIF   RTC in 2 weeks with cbc cmp upt  to start weekly taxol    Total time spent in counseling and discussion about further management options including relevant lab work, treatment,  prognosis, medications and intended side effects was more than 35 minutes. More than 50% of the time was spent on counseling and coordination of care.  I spent a total of 35 minutes on the day of the visit.This includes face to face time and non-face to face time preparing to see the patient (eg, review of tests), Obtaining and/or reviewing separately obtained history, Documenting clinical information in the electronic or other health record, Independently interpreting resultsand communicating results to the patient/family/caregiver, or Care coordination.

## 2022-12-29 ENCOUNTER — OFFICE VISIT (OUTPATIENT)
Dept: HEMATOLOGY/ONCOLOGY | Facility: CLINIC | Age: 47
End: 2022-12-29
Payer: COMMERCIAL

## 2022-12-29 VITALS
HEART RATE: 91 BPM | TEMPERATURE: 99 F | BODY MASS INDEX: 29.29 KG/M2 | RESPIRATION RATE: 16 BRPM | DIASTOLIC BLOOD PRESSURE: 91 MMHG | WEIGHT: 155 LBS | SYSTOLIC BLOOD PRESSURE: 148 MMHG | OXYGEN SATURATION: 98 %

## 2022-12-29 DIAGNOSIS — C50.919 TRIPLE NEGATIVE BREAST CANCER: Primary | ICD-10-CM

## 2022-12-29 DIAGNOSIS — R11.2 CINV (CHEMOTHERAPY-INDUCED NAUSEA AND VOMITING): ICD-10-CM

## 2022-12-29 DIAGNOSIS — E11.9 DIABETES MELLITUS WITHOUT COMPLICATION: ICD-10-CM

## 2022-12-29 DIAGNOSIS — C50.212 MALIGNANT NEOPLASM OF UPPER-INNER QUADRANT OF LEFT BREAST IN FEMALE, ESTROGEN RECEPTOR POSITIVE: ICD-10-CM

## 2022-12-29 DIAGNOSIS — Z71.9 ENCOUNTER FOR EDUCATION: ICD-10-CM

## 2022-12-29 DIAGNOSIS — Z17.0 MALIGNANT NEOPLASM OF UPPER-INNER QUADRANT OF LEFT BREAST IN FEMALE, ESTROGEN RECEPTOR POSITIVE: ICD-10-CM

## 2022-12-29 DIAGNOSIS — K12.32 ORAL MUCOSITIS (ULCERATIVE) DUE TO OTHER DRUGS: ICD-10-CM

## 2022-12-29 DIAGNOSIS — T45.1X5A CHEMOTHERAPY-INDUCED NAUSEA AND VOMITING: ICD-10-CM

## 2022-12-29 DIAGNOSIS — R11.2 CHEMOTHERAPY-INDUCED NAUSEA AND VOMITING: ICD-10-CM

## 2022-12-29 DIAGNOSIS — T45.1X5A CINV (CHEMOTHERAPY-INDUCED NAUSEA AND VOMITING): ICD-10-CM

## 2022-12-29 PROCEDURE — 99215 PR OFFICE/OUTPT VISIT, EST, LEVL V, 40-54 MIN: ICD-10-PCS | Mod: S$GLB,,, | Performed by: NURSE PRACTITIONER

## 2022-12-29 PROCEDURE — 3008F PR BODY MASS INDEX (BMI) DOCUMENTED: ICD-10-PCS | Mod: CPTII,S$GLB,, | Performed by: NURSE PRACTITIONER

## 2022-12-29 PROCEDURE — 3080F PR MOST RECENT DIASTOLIC BLOOD PRESSURE >= 90 MM HG: ICD-10-PCS | Mod: CPTII,S$GLB,, | Performed by: NURSE PRACTITIONER

## 2022-12-29 PROCEDURE — 4010F ACE/ARB THERAPY RXD/TAKEN: CPT | Mod: CPTII,S$GLB,, | Performed by: NURSE PRACTITIONER

## 2022-12-29 PROCEDURE — 99215 OFFICE O/P EST HI 40 MIN: CPT | Mod: S$GLB,,, | Performed by: NURSE PRACTITIONER

## 2022-12-29 PROCEDURE — 4010F PR ACE/ARB THEARPY RXD/TAKEN: ICD-10-PCS | Mod: CPTII,S$GLB,, | Performed by: NURSE PRACTITIONER

## 2022-12-29 PROCEDURE — 3077F PR MOST RECENT SYSTOLIC BLOOD PRESSURE >= 140 MM HG: ICD-10-PCS | Mod: CPTII,S$GLB,, | Performed by: NURSE PRACTITIONER

## 2022-12-29 PROCEDURE — 3080F DIAST BP >= 90 MM HG: CPT | Mod: CPTII,S$GLB,, | Performed by: NURSE PRACTITIONER

## 2022-12-29 PROCEDURE — 1159F MED LIST DOCD IN RCRD: CPT | Mod: CPTII,S$GLB,, | Performed by: NURSE PRACTITIONER

## 2022-12-29 PROCEDURE — 3008F BODY MASS INDEX DOCD: CPT | Mod: CPTII,S$GLB,, | Performed by: NURSE PRACTITIONER

## 2022-12-29 PROCEDURE — 3077F SYST BP >= 140 MM HG: CPT | Mod: CPTII,S$GLB,, | Performed by: NURSE PRACTITIONER

## 2022-12-29 PROCEDURE — 1159F PR MEDICATION LIST DOCUMENTED IN MEDICAL RECORD: ICD-10-PCS | Mod: CPTII,S$GLB,, | Performed by: NURSE PRACTITIONER

## 2022-12-29 RX ORDER — FAMOTIDINE 10 MG/ML
20 INJECTION INTRAVENOUS
Status: CANCELLED | OUTPATIENT
Start: 2022-12-29

## 2022-12-29 RX ORDER — SODIUM CHLORIDE 0.9 % (FLUSH) 0.9 %
10 SYRINGE (ML) INJECTION
Status: CANCELLED | OUTPATIENT
Start: 2022-12-29

## 2022-12-29 RX ORDER — HEPARIN 100 UNIT/ML
500 SYRINGE INTRAVENOUS
Status: CANCELLED | OUTPATIENT
Start: 2022-12-29

## 2022-12-29 RX ORDER — EPINEPHRINE 0.3 MG/.3ML
0.3 INJECTION SUBCUTANEOUS ONCE AS NEEDED
Status: CANCELLED | OUTPATIENT
Start: 2022-12-29

## 2022-12-29 RX ORDER — DIPHENHYDRAMINE HYDROCHLORIDE 50 MG/ML
50 INJECTION INTRAMUSCULAR; INTRAVENOUS ONCE AS NEEDED
Status: CANCELLED | OUTPATIENT
Start: 2022-12-29

## 2022-12-29 NOTE — PROGRESS NOTES
MEDICAL ONCOLOGY INITIAL CONSULTATION NOTE    Patient ID: Tequila Hernandez is a 47 y.o. female.    Chief Complaint:  Breast cancer    HPI:  Patient is a 46-year-old female with past medical history of diabetes mellitus, hypertension with recent diagnosis of triple negative breast cancer for which she underwent left breast partial mastectomy and sentinel lymph node biopsy.  Patient reports recovering well from recent surgery and presents to our clinic today for further evaluation.  Voices no acute complaints.  She also has undergone genetic testing with surgery does of her young age and triple negative status.      Pathology:     10/7/22:  1. LEFT AXILLARY SENTINEL LYMPH NODE, EXCISIONAL BIOPSY:   - BENIGN REACTIVE SENTINEL LYMPH NODE EXAMINED, AND IT IS NEGATIVE FOR   METASTATIC CARCINOMA (0/1).   2. LEFT BREAST, LEFT SIMPLE MASTECTOMY:   - RESIDUAL INFILTRATING DUCTAL CARCINOMA, BOO GRADE 3, SIZE 13 MM, ASSOCIATED WITH PRIOR    BIOPSY SCAR/SEED MARKER, MARGINS UNINVOLVED IN THIS SPECIMEN, SEE TUMOR SUMMARY.   - NO EVIDENCE OF DCIS.   - NONPROLIFERATIVE FIBROCYSTIC CHANGES AND PSEUDOANGIOMATOUS   HYPERPLASIA(PASH) PRESENT      COMPRISING A GROSSLY EVIDENT 8 MM NODULE AS WELL INCIDENTAL SMALL   FIBROADENOMA..   3. BREAST, ADDITIONAL SUPERIOR MARGIN, MARGIN EXCISION:   - BENIGN MAMMARY TISSUE AND ADIPOSE TISSUE, NEGATIVE FOR TUMOR, MARGIN   CLEAR.   4. LEFT BREAST, ADDITIONAL MEDIAL MARGIN, MARGIN EXCISION:   - BENIGN MAMMARY TISSUE AND ADIPOSE TISSUE, NEGATIVE FOR TUMOR, MARGIN   CLEAR.   LEFT  BREAST TUMOR SUMMARY(INCORPORATES PARTS 1 TO 4):   SPECIMEN TYPE AND PROCEDURE:      Simple mastectomy with additional margin   excisions and sentinel node biopsy.   SPECIMEN LATERALITY:         Left.   TUMOR SITE (QUADRANT):         Not specified.   SPECIMEN INTEGRITY:         Intact    HISTOLOGIC TYPE:         Infiltrating ductal carcinoma.   COMBINED HISTOLOGIC GRADE:      [place] Grade 3   BOO HISTOLOGIC  "SCORE:      3,3,,3= total score 9 of 9    SIZE OF INVASIVE COMPONENT:      13 mm   TUMOR FOCALITY:         Unifocal.   TUMOR NECROSIS:         Not identified.   LYMPHOVASCULAR INVASION:      Not identified.   PERINEURAL INVASION:         Not identified   SKIN:               Not applicable (Skin is present and uninvolved.)   NIPPLE:               Not applicable (Nipple is not present.)   SIZE/TYPE/EXTENT INTRADUCTAL CANCER:  Not identified.        EXTENSIVE INTRADUCTAL COMPONENT (EIC): Not applicable (No DCIS).        NECROSIS:            Not applicable.   MICROCALCIFICATION:         Not identified   SURGICAL MARGINS:             INVASIVE:      Negative                IN SITU:      Not applicable.   DISTANCE TO CLOSEST MARGIN:      Invasive carcinoma extends to 3.5 mm of   superior margin.   LYMPH NODES:                 # TOTAL LYMPH NODES EXAMINED:   1  (1 sentinel in part 1)                 # SENTINEL NODES EXAMINED:   1                 # NODES WITH MACROMETASTASIS:   0                 # NODES WITH MICROMETASTASIS:   0    METASTASIS:            Not applicable.   TREATMENT EFFECT:         No known prior therapy.   TNM CODE:            pT1c  pN0(sn)  M-n/a  G3   ANCILLARY STUDIES: BREAST PROGNOSTIC PANEL: Per prior biopsy [id number], the   tumor is ER-, [place]-, HER2 negative ("Triple Negative").   ___________________________________________________________________________    Imaging:     Past Medical History:   Diagnosis Date    Depression     Diabetes mellitus     DM (diabetes mellitus), type 2     Elderly multigravida     Hypertension      Family History   Problem Relation Age of Onset    Hypertension Mother     Diabetes Mother     Hyperlipidemia Mother     Hyperthyroidism Mother     Stroke Maternal Uncle     No Known Problems Father     No Known Problems Sister     No Known Problems Brother     No Known Problems Maternal Grandmother     No Known Problems Maternal Grandfather     No Known Problems Paternal Grandmother  "    No Known Problems Paternal Grandfather      Social History     Socioeconomic History    Marital status:    Tobacco Use    Smoking status: Never    Smokeless tobacco: Never   Substance and Sexual Activity    Alcohol use: Not Currently    Drug use: Never    Sexual activity: Yes     Partners: Male     Birth control/protection: None     Comment: history of IUD     Past Surgical History:   Procedure Laterality Date    LEFT OOPHORECTOMY Left 2012    left ovary removed         Review of systems:  Review of Systems   Constitutional:  Negative for activity change, appetite change, chills, diaphoresis, fatigue and unexpected weight change.   HENT:  Negative for congestion, facial swelling, hearing loss, mouth sores, trouble swallowing and voice change.    Eyes:  Negative for photophobia, pain, discharge and itching.   Respiratory:  Negative for apnea, cough, choking, chest tightness and shortness of breath.    Cardiovascular:  Negative for chest pain, palpitations and leg swelling.   Gastrointestinal:  Positive for nausea. Negative for abdominal distention, abdominal pain, anal bleeding and blood in stool.   Endocrine: Negative for cold intolerance, heat intolerance, polydipsia and polyphagia.   Genitourinary:  Negative for difficulty urinating, dysuria, flank pain and hematuria.   Musculoskeletal:  Negative for arthralgias, back pain, joint swelling, myalgias, neck pain and neck stiffness.        Positive for breast mass. See HPI   Skin:  Negative for color change, pallor and wound.   Allergic/Immunologic: Negative for environmental allergies, food allergies and immunocompromised state.   Neurological:  Negative for dizziness, seizures, facial asymmetry, speech difficulty, light-headedness, numbness and headaches.   Hematological:  Negative for adenopathy. Does not bruise/bleed easily.   Psychiatric/Behavioral:  Negative for agitation, behavioral problems, confusion, decreased concentration and sleep disturbance.         Physical Exam  Vitals and nursing note reviewed.   Constitutional:       General: She is not in acute distress.     Appearance: Normal appearance. She is not ill-appearing.   HENT:      Head: Normocephalic and atraumatic.      Nose: No congestion or rhinorrhea.   Eyes:      General: No scleral icterus.     Extraocular Movements: Extraocular movements intact.      Pupils: Pupils are equal, round, and reactive to light.   Cardiovascular:      Rate and Rhythm: Normal rate and regular rhythm.      Pulses: Normal pulses.      Heart sounds: Normal heart sounds. No murmur heard.    No gallop.   Pulmonary:      Effort: Pulmonary effort is normal. No respiratory distress.      Breath sounds: Normal breath sounds. No stridor. No wheezing or rhonchi.   Abdominal:      General: Bowel sounds are normal. There is no distension.      Palpations: There is no mass.      Tenderness: There is no abdominal tenderness. There is no guarding.   Musculoskeletal:         General: No swelling, tenderness, deformity or signs of injury. Normal range of motion.      Cervical back: Normal range of motion and neck supple. No rigidity. No muscular tenderness.      Right lower leg: No edema.      Left lower leg: No edema.   Skin:     General: Skin is warm.      Coloration: Skin is not jaundiced or pale.      Findings: No bruising or lesion.      Comments: Status post left breast lumpectomy.  Healing well   Neurological:      General: No focal deficit present.      Mental Status: She is alert and oriented to person, place, and time.      Cranial Nerves: No cranial nerve deficit.      Sensory: No sensory deficit.      Motor: No weakness.      Gait: Gait normal.   Psychiatric:         Mood and Affect: Mood normal.         Behavior: Behavior normal.         Thought Content: Thought content normal.                       Vitals:    12/29/22 0803   BP: (!) 148/91   Pulse: 91   Resp: 16   Temp: 98.7 °F (37.1 °C)   Body surface area is 1.74 meters  squared.    Assessment/Plan:      ECOG 1    Infiltrating ductal carcinoma arising from the left breast in female: Stage IB  == Triple negative.  Status post left breast lumpectomy and sentinel lymph node evaluation  ==  pT1c  pN0(sn)  M-n/a  G3.  Size of invasive component is more than 1 cm (13 mm).  Based on her triple negative status and young age my recommendation would be for adjuvant chemotherapy with dose dense AC x4 followed by weekly paclitaxel for 12 cycles.  Post lumpectomy and after completion of chemotherapy patient will be followed by radiation oncology for evaluation of radiation treatment.   == Patient will also need genetic testing and based on documentation from surgery she is agreeable to that.  == will obtain prior authorization request and obtain echocardiogram prior.  Patient will also need port placement and referral has been made in this regard   10/19/22:  Patient here today to discuss upcoming chemotherapy, side effect profile, risk versus benefits, and expected outcomes have been discussed today. All questions and concerns answered and consents have been signed. Echo planned for tomorrow, port placement TBD. Plan to start treatment on 11/3/22 as she had lumpectomy 10/4/22.   11/3/22:  Status post port placement on Tuesday with Dr. uHerta, review of echo on 10/22/2022 shows EF> 55%, labs reviewed and patient is cleared to begin cycle 1 dose dense AC with Neulasta support as planned for today  11/17/22:  Tolerated cycle 1 of ddAC very well with no complaints other than mild nausea x1 day.  Patient was able to take Phenergan without any lingering effects.  Labs reviewed and patient is cleared for for today review of recent genetic testing showing no mutation identified.  12/15/22:  Tolerated cycle 3 dose dense AC fairly well with complaints of prolonged nausea which was not relieved with Phenergan and Zofran. Will send out compazine.  Patient is still able to work and is feeling good today.   Labs reviewed and patient is cleared for cycle 4 of 4 AC treatment as planned for today  12/29/22: Starting weekly taxol x 12, reviewed side effect profile including neuropathy. Patient has DMII, but  no baseline neuropathy.  She tolerated AC fairly well with some nausea and she has mild mucositis, encouraged salt/soda rinse may rx magic mouthwash if needed in future, pt will notify clinic if needed. Consent for Taxol previously signed with AC consent.  Will see back in 1 week, if well tolerated may consider weekly chemo with every other week office visits.      2. DMII  ==Continue to follow up with pcp  ==Continue Metformin per pcp  ==Will monitor for any neuropathy with Taxol, no baseline neuropathy reported    3. CINV  ==Continue Ondansetron, may alternate with phenergan for breakthrough nausea    4. Mucositis  ==Salt and soda rinse  ==Notify clinic if increase in mucositis and will send out magic mouthwash    Plan:  Rtc 1 week cbc cmp upt      Addendum: Patient had reaction to Taxol. During first few minutes of infusion she had flushing, ears felt hot, blood pressure increased to 174/90.  Taxol stopped, benadryl and solumedrol administered, patient was monitored per [facility] nurses. Will have her rtc to discuss further treatment plans with MD    Total time spent in counseling and discussion about further management options including relevant lab work, treatment,  prognosis, medications and intended side effects was more than 35 minutes. More than 50% of the time was spent on counseling and coordination of care.  I spent a total of 35 minutes on the day of the visit.This includes face to face time and non-face to face time preparing to see the patient (eg, review of tests), Obtaining and/or reviewing separately obtained history, Documenting clinical information in the electronic or other health record, Independently interpreting resultsand communicating results to the patient/family/caregiver, or Care  coordination.

## 2022-12-30 DIAGNOSIS — C50.919 TRIPLE NEGATIVE BREAST CANCER: Primary | ICD-10-CM

## 2023-01-04 LAB
ALBUMIN SERPL BCP-MCNC: 4.2 G/DL (ref 3.4–5)
ALBUMIN/GLOBULIN RATIO: 1.31 RATIO (ref 1.1–1.8)
ALP SERPL-CCNC: 80 U/L (ref 46–116)
ALT SERPL W P-5'-P-CCNC: 37 U/L (ref 12–78)
ANION GAP SERPL CALC-SCNC: 8 MMOL/L (ref 3–11)
AST SERPL-CCNC: 20 U/L (ref 15–37)
B-HCG UR QL: NEGATIVE
BASOPHILS NFR BLD: 0.6 % (ref 0–3)
BILIRUB SERPL-MCNC: 0.5 MG/DL (ref 0–1)
BUN SERPL-MCNC: 9 MG/DL (ref 7–18)
BUN/CREAT SERPL: 15.78 RATIO (ref 7–18)
CALCIUM SERPL-MCNC: 9.1 MG/DL (ref 8.8–10.5)
CHLORIDE SERPL-SCNC: 107 MMOL/L (ref 100–108)
CO2 SERPL-SCNC: 28 MMOL/L (ref 21–32)
CREAT SERPL-MCNC: 0.57 MG/DL (ref 0.55–1.02)
EOSINOPHIL NFR BLD: 1.7 % (ref 1–3)
ERYTHROCYTE [DISTWIDTH] IN BLOOD BY AUTOMATED COUNT: 16.2 % (ref 12.5–18)
GFR ESTIMATION: > 60
GLOBULIN: 3.2 G/DL (ref 2.3–3.5)
GLUCOSE SERPL-MCNC: 89 MG/DL (ref 70–110)
HCT VFR BLD AUTO: 36.6 % (ref 37–47)
HGB BLD-MCNC: 12.7 G/DL (ref 12–16)
LYMPHOCYTES NFR BLD: 15.5 % (ref 25–40)
MCH RBC QN AUTO: 30.8 PG (ref 27–31.2)
MCHC RBC AUTO-ENTMCNC: 34.7 G/DL (ref 31.8–35.4)
MCV RBC AUTO: 88.8 FL (ref 80–97)
MONOCYTES NFR BLD: 10.6 % (ref 1–15)
NEUTROPHILS # BLD AUTO: 5.05 10*3/UL (ref 1.8–7.7)
NEUTROPHILS NFR BLD: 70.5 % (ref 37–80)
NUCLEATED RED BLOOD CELLS: 0 %
PLATELETS: 299 10*3/UL (ref 142–424)
POTASSIUM SERPL-SCNC: 4 MMOL/L (ref 3.6–5.2)
PROT SERPL-MCNC: 7.4 G/DL (ref 6.4–8.2)
RBC # BLD AUTO: 4.12 10*6/UL (ref 4.2–5.4)
SODIUM BLD-SCNC: 143 MMOL/L (ref 135–145)
WBC # BLD: 7.2 10*3/UL (ref 4.6–10.2)

## 2023-01-05 ENCOUNTER — OFFICE VISIT (OUTPATIENT)
Dept: HEMATOLOGY/ONCOLOGY | Facility: CLINIC | Age: 48
End: 2023-01-05
Payer: COMMERCIAL

## 2023-01-05 VITALS
DIASTOLIC BLOOD PRESSURE: 92 MMHG | RESPIRATION RATE: 16 BRPM | WEIGHT: 153 LBS | BODY MASS INDEX: 28.89 KG/M2 | HEIGHT: 61 IN | HEART RATE: 94 BPM | OXYGEN SATURATION: 98 % | SYSTOLIC BLOOD PRESSURE: 187 MMHG | TEMPERATURE: 99 F

## 2023-01-05 DIAGNOSIS — C50.919 TRIPLE NEGATIVE BREAST CANCER: Primary | ICD-10-CM

## 2023-01-05 PROCEDURE — 3077F SYST BP >= 140 MM HG: CPT | Mod: CPTII,S$GLB,, | Performed by: INTERNAL MEDICINE

## 2023-01-05 PROCEDURE — 99417 PR PROLONGED SVC, OUTPT, W/WO DIRECT PT CONTACT,  EA ADDTL 15 MIN: ICD-10-PCS | Mod: S$GLB,,, | Performed by: INTERNAL MEDICINE

## 2023-01-05 PROCEDURE — 1159F MED LIST DOCD IN RCRD: CPT | Mod: CPTII,S$GLB,, | Performed by: INTERNAL MEDICINE

## 2023-01-05 PROCEDURE — 99215 OFFICE O/P EST HI 40 MIN: CPT | Mod: S$GLB,,, | Performed by: INTERNAL MEDICINE

## 2023-01-05 PROCEDURE — 3080F PR MOST RECENT DIASTOLIC BLOOD PRESSURE >= 90 MM HG: ICD-10-PCS | Mod: CPTII,S$GLB,, | Performed by: INTERNAL MEDICINE

## 2023-01-05 PROCEDURE — 3008F BODY MASS INDEX DOCD: CPT | Mod: CPTII,S$GLB,, | Performed by: INTERNAL MEDICINE

## 2023-01-05 PROCEDURE — 99417 PROLNG OP E/M EACH 15 MIN: CPT | Mod: S$GLB,,, | Performed by: INTERNAL MEDICINE

## 2023-01-05 PROCEDURE — 99215 PR OFFICE/OUTPT VISIT, EST, LEVL V, 40-54 MIN: ICD-10-PCS | Mod: S$GLB,,, | Performed by: INTERNAL MEDICINE

## 2023-01-05 PROCEDURE — 3008F PR BODY MASS INDEX (BMI) DOCUMENTED: ICD-10-PCS | Mod: CPTII,S$GLB,, | Performed by: INTERNAL MEDICINE

## 2023-01-05 PROCEDURE — 3080F DIAST BP >= 90 MM HG: CPT | Mod: CPTII,S$GLB,, | Performed by: INTERNAL MEDICINE

## 2023-01-05 PROCEDURE — 1159F PR MEDICATION LIST DOCUMENTED IN MEDICAL RECORD: ICD-10-PCS | Mod: CPTII,S$GLB,, | Performed by: INTERNAL MEDICINE

## 2023-01-05 PROCEDURE — 3077F PR MOST RECENT SYSTOLIC BLOOD PRESSURE >= 140 MM HG: ICD-10-PCS | Mod: CPTII,S$GLB,, | Performed by: INTERNAL MEDICINE

## 2023-01-05 NOTE — PROGRESS NOTES
MEDICAL ONCOLOGY FOLLOW UP CONSULTATION NOTE    Patient ID: Tequila Hernandez is a 47 y.o. female.    Chief Complaint:  Breast cancer    HPI:  Patient is a 46-year-old female with past medical history of diabetes mellitus, hypertension with recent diagnosis of triple negative breast cancer for which she underwent left breast partial mastectomy and sentinel lymph node biopsy.  Patient reports recovering well from recent surgery and presents to our clinic today for further evaluation.  Voices no acute complaints.  She also has undergone genetic testing with surgery does of her young age and triple negative status.      Pathology:     10/7/22:  1. LEFT AXILLARY SENTINEL LYMPH NODE, EXCISIONAL BIOPSY:   - BENIGN REACTIVE SENTINEL LYMPH NODE EXAMINED, AND IT IS NEGATIVE FOR   METASTATIC CARCINOMA (0/1).   2. LEFT BREAST, LEFT SIMPLE MASTECTOMY:   - RESIDUAL INFILTRATING DUCTAL CARCINOMA, BOO GRADE 3, SIZE 13 MM, ASSOCIATED WITH PRIOR    BIOPSY SCAR/SEED MARKER, MARGINS UNINVOLVED IN THIS SPECIMEN, SEE TUMOR SUMMARY.   - NO EVIDENCE OF DCIS.   - NONPROLIFERATIVE FIBROCYSTIC CHANGES AND PSEUDOANGIOMATOUS   HYPERPLASIA(PASH) PRESENT      COMPRISING A GROSSLY EVIDENT 8 MM NODULE AS WELL INCIDENTAL SMALL   FIBROADENOMA..   3. BREAST, ADDITIONAL SUPERIOR MARGIN, MARGIN EXCISION:   - BENIGN MAMMARY TISSUE AND ADIPOSE TISSUE, NEGATIVE FOR TUMOR, MARGIN   CLEAR.   4. LEFT BREAST, ADDITIONAL MEDIAL MARGIN, MARGIN EXCISION:   - BENIGN MAMMARY TISSUE AND ADIPOSE TISSUE, NEGATIVE FOR TUMOR, MARGIN   CLEAR.   LEFT  BREAST TUMOR SUMMARY(INCORPORATES PARTS 1 TO 4):   SPECIMEN TYPE AND PROCEDURE:      Simple mastectomy with additional margin   excisions and sentinel node biopsy.   SPECIMEN LATERALITY:         Left.   TUMOR SITE (QUADRANT):         Not specified.   SPECIMEN INTEGRITY:         Intact    HISTOLOGIC TYPE:         Infiltrating ductal carcinoma.   COMBINED HISTOLOGIC GRADE:      [place] Grade 3   BOO HISTOLOGIC  "SCORE:      3,3,,3= total score 9 of 9    SIZE OF INVASIVE COMPONENT:      13 mm   TUMOR FOCALITY:         Unifocal.   TUMOR NECROSIS:         Not identified.   LYMPHOVASCULAR INVASION:      Not identified.   PERINEURAL INVASION:         Not identified   SKIN:               Not applicable (Skin is present and uninvolved.)   NIPPLE:               Not applicable (Nipple is not present.)   SIZE/TYPE/EXTENT INTRADUCTAL CANCER:  Not identified.        EXTENSIVE INTRADUCTAL COMPONENT (EIC): Not applicable (No DCIS).        NECROSIS:            Not applicable.   MICROCALCIFICATION:         Not identified   SURGICAL MARGINS:             INVASIVE:      Negative                IN SITU:      Not applicable.   DISTANCE TO CLOSEST MARGIN:      Invasive carcinoma extends to 3.5 mm of   superior margin.   LYMPH NODES:                 # TOTAL LYMPH NODES EXAMINED:   1  (1 sentinel in part 1)                 # SENTINEL NODES EXAMINED:   1                 # NODES WITH MACROMETASTASIS:   0                 # NODES WITH MICROMETASTASIS:   0    METASTASIS:            Not applicable.   TREATMENT EFFECT:         No known prior therapy.   TNM CODE:            pT1c  pN0(sn)  M-n/a  G3   ANCILLARY STUDIES: BREAST PROGNOSTIC PANEL: Per prior biopsy [id number], the   tumor is ER-, [place]-, HER2 negative ("Triple Negative").   ___________________________________________________________________________    Imaging:     Past Medical History:   Diagnosis Date    Depression     Diabetes mellitus     DM (diabetes mellitus), type 2     Elderly multigravida     Hypertension      Family History   Problem Relation Age of Onset    Hypertension Mother     Diabetes Mother     Hyperlipidemia Mother     Hyperthyroidism Mother     Stroke Maternal Uncle     No Known Problems Father     No Known Problems Sister     No Known Problems Brother     No Known Problems Maternal Grandmother     No Known Problems Maternal Grandfather     No Known Problems Paternal Grandmother  "    No Known Problems Paternal Grandfather      Social History     Socioeconomic History    Marital status:    Tobacco Use    Smoking status: Never    Smokeless tobacco: Never   Substance and Sexual Activity    Alcohol use: Not Currently    Drug use: Never    Sexual activity: Yes     Partners: Male     Birth control/protection: None     Comment: history of IUD     Past Surgical History:   Procedure Laterality Date    LEFT OOPHORECTOMY Left 2012    left ovary removed         Review of systems:  Review of Systems   Constitutional:  Negative for activity change, appetite change, chills, diaphoresis, fatigue and unexpected weight change.   HENT:  Negative for congestion, facial swelling, hearing loss, mouth sores, trouble swallowing and voice change.    Eyes:  Negative for photophobia, pain, discharge and itching.   Respiratory:  Negative for apnea, cough, choking, chest tightness and shortness of breath.    Cardiovascular:  Negative for chest pain, palpitations and leg swelling.   Gastrointestinal:  Positive for nausea. Negative for abdominal distention, abdominal pain, anal bleeding and blood in stool.   Endocrine: Negative for cold intolerance, heat intolerance, polydipsia and polyphagia.   Genitourinary:  Negative for difficulty urinating, dysuria, flank pain and hematuria.   Musculoskeletal:  Negative for arthralgias, back pain, joint swelling, myalgias, neck pain and neck stiffness.        Positive for breast mass. See HPI   Skin:  Negative for color change, pallor and wound.   Allergic/Immunologic: Negative for environmental allergies, food allergies and immunocompromised state.   Neurological:  Negative for dizziness, seizures, facial asymmetry, speech difficulty, light-headedness, numbness and headaches.   Hematological:  Negative for adenopathy. Does not bruise/bleed easily.   Psychiatric/Behavioral:  Negative for agitation, behavioral problems, confusion, decreased concentration and sleep disturbance.         Physical Exam  Vitals and nursing note reviewed.   Constitutional:       General: She is not in acute distress.     Appearance: Normal appearance. She is not ill-appearing.   HENT:      Head: Normocephalic and atraumatic.      Nose: No congestion or rhinorrhea.   Eyes:      General: No scleral icterus.     Extraocular Movements: Extraocular movements intact.      Pupils: Pupils are equal, round, and reactive to light.   Cardiovascular:      Rate and Rhythm: Normal rate and regular rhythm.      Pulses: Normal pulses.      Heart sounds: Normal heart sounds. No murmur heard.    No gallop.   Pulmonary:      Effort: Pulmonary effort is normal. No respiratory distress.      Breath sounds: Normal breath sounds. No stridor. No wheezing or rhonchi.   Abdominal:      General: Bowel sounds are normal. There is no distension.      Palpations: There is no mass.      Tenderness: There is no abdominal tenderness. There is no guarding.   Musculoskeletal:         General: No swelling, tenderness, deformity or signs of injury. Normal range of motion.      Cervical back: Normal range of motion and neck supple. No rigidity. No muscular tenderness.      Right lower leg: No edema.      Left lower leg: No edema.   Skin:     General: Skin is warm.      Coloration: Skin is not jaundiced or pale.      Findings: No bruising or lesion.      Comments: Status post left breast lumpectomy.  Healing well   Neurological:      General: No focal deficit present.      Mental Status: She is alert and oriented to person, place, and time.      Cranial Nerves: No cranial nerve deficit.      Sensory: No sensory deficit.      Motor: No weakness.      Gait: Gait normal.   Psychiatric:         Mood and Affect: Mood normal.         Behavior: Behavior normal.         Thought Content: Thought content normal.                       Vitals:    01/05/23 0846   BP: (!) 187/92   Pulse: 94   Resp: 16   Temp: 98.5 °F (36.9 °C)   Body surface area is 1.73 meters  squared.    Assessment/Plan:      ECOG 1    Infiltrating ductal carcinoma arising from the left breast in female: Stage IB  == Triple negative.  Status post left breast lumpectomy and sentinel lymph node evaluation  ==  pT1c  pN0(sn)  M-n/a  G3.  Size of invasive component is more than 1 cm (13 mm).  Based on her triple negative status and young age my recommendation would be for adjuvant chemotherapy with dose dense AC x4 followed by weekly paclitaxel for 12 cycles.  Post lumpectomy and after completion of chemotherapy patient will be followed by radiation oncology for evaluation of radiation treatment.   == Patient will also need genetic testing and based on documentation from surgery she is agreeable to that.  == 10/19/22:  Patient here today to discuss upcoming chemotherapy, side effect profile, risk versus benefits, and expected outcomes have been discussed today. All questions and concerns answered and consents have been signed. Echo planned for tomorrow, port placement TBD. Plan to start treatment on 11/3/22 as she had lumpectomy 10/4/22.   ==11/3/22:  Status post port placement on Tuesday with Dr. Huerta, review of echo on 10/22/2022 shows EF> 55%, labs reviewed and patient is cleared to begin cycle 1 dose dense AC with Neulasta support as planned for today  ==11/17/22:  Tolerated cycle 1 of ddAC very well with no complaints other than mild nausea x1 day.  Patient was able to take Phenergan without any lingering effects.  Labs reviewed and patient is cleared for for today review of recent genetic testing showing no mutation identified.  ==12/15/22:  Tolerated cycle 3 dose dense AC fairly well with complaints of prolonged nausea which was not relieved with Phenergan and Zofran. Will send out compazine.  Patient is still able to work and is feeling good today.  Labs reviewed and patient is cleared for cycle 4 of 4 AC treatment as planned for today  ==12/29/22: Patient had reaction to Taxol. During first few  minutes of infusion she had flushing, ears felt hot, blood pressure increased to 174/90.  Taxol stopped, benadryl and solumedrol administered, patient was monitored per [facility] nurses. Will hence change her chemotherapy to Abraxane. PA request sent for weekly abraxane 80mg/m2 for 12 cycles    2. DMII  ==Continue to follow up with pcp  ==Continue Metformin per pcp  ==Will monitor for any neuropathy with Taxol, no baseline neuropathy reported    3. CINV  ==Continue Ondansetron, may alternate with phenergan for breakthrough nausea    4. Mucositis  ==Salt and soda rinse  ==Notify clinic if increase in mucositis and will send out magic mouthwash    Plan:  RTC in 1 week CBC, CMP, UPT and to start C1/12 of Abraxane      Total time spent in counseling and discussion about further management options including relevant lab work, treatment,  prognosis, medications and intended side effects was more than 60 minutes. More than 50% of the time was spent on counseling and coordination of care.  I spent a total of 60 minutes on the day of the visit.This includes face to face time and non-face to face time preparing to see the patient (eg, review of tests), Obtaining and/or reviewing separately obtained history, Documenting clinical information in the electronic or other health record, Independently interpreting resultsand communicating results to the patient/family/caregiver, or Care coordination.

## 2023-01-10 ENCOUNTER — PATIENT MESSAGE (OUTPATIENT)
Dept: HEMATOLOGY/ONCOLOGY | Facility: CLINIC | Age: 48
End: 2023-01-10
Payer: COMMERCIAL

## 2023-01-19 ENCOUNTER — OFFICE VISIT (OUTPATIENT)
Dept: HEMATOLOGY/ONCOLOGY | Facility: CLINIC | Age: 48
End: 2023-01-19
Payer: COMMERCIAL

## 2023-01-19 VITALS
OXYGEN SATURATION: 98 % | TEMPERATURE: 99 F | RESPIRATION RATE: 16 BRPM | HEIGHT: 61 IN | WEIGHT: 153 LBS | BODY MASS INDEX: 28.89 KG/M2 | HEART RATE: 99 BPM | SYSTOLIC BLOOD PRESSURE: 162 MMHG | DIASTOLIC BLOOD PRESSURE: 81 MMHG

## 2023-01-19 DIAGNOSIS — Z71.9 ENCOUNTER FOR EDUCATION: Primary | ICD-10-CM

## 2023-01-19 DIAGNOSIS — C50.919 TRIPLE NEGATIVE BREAST CANCER: ICD-10-CM

## 2023-01-19 DIAGNOSIS — T45.1X5A CHEMOTHERAPY-INDUCED NAUSEA AND VOMITING: ICD-10-CM

## 2023-01-19 DIAGNOSIS — R11.2 CHEMOTHERAPY-INDUCED NAUSEA AND VOMITING: ICD-10-CM

## 2023-01-19 DIAGNOSIS — E11.9 DIABETES MELLITUS WITHOUT COMPLICATION: ICD-10-CM

## 2023-01-19 DIAGNOSIS — T45.1X5A CINV (CHEMOTHERAPY-INDUCED NAUSEA AND VOMITING): ICD-10-CM

## 2023-01-19 DIAGNOSIS — R11.2 CINV (CHEMOTHERAPY-INDUCED NAUSEA AND VOMITING): ICD-10-CM

## 2023-01-19 PROCEDURE — 99215 OFFICE O/P EST HI 40 MIN: CPT | Mod: S$GLB,,, | Performed by: NURSE PRACTITIONER

## 2023-01-19 PROCEDURE — 3079F PR MOST RECENT DIASTOLIC BLOOD PRESSURE 80-89 MM HG: ICD-10-PCS | Mod: CPTII,S$GLB,, | Performed by: NURSE PRACTITIONER

## 2023-01-19 PROCEDURE — 3008F PR BODY MASS INDEX (BMI) DOCUMENTED: ICD-10-PCS | Mod: CPTII,S$GLB,, | Performed by: NURSE PRACTITIONER

## 2023-01-19 PROCEDURE — 3079F DIAST BP 80-89 MM HG: CPT | Mod: CPTII,S$GLB,, | Performed by: NURSE PRACTITIONER

## 2023-01-19 PROCEDURE — 1159F PR MEDICATION LIST DOCUMENTED IN MEDICAL RECORD: ICD-10-PCS | Mod: CPTII,S$GLB,, | Performed by: NURSE PRACTITIONER

## 2023-01-19 PROCEDURE — 3077F PR MOST RECENT SYSTOLIC BLOOD PRESSURE >= 140 MM HG: ICD-10-PCS | Mod: CPTII,S$GLB,, | Performed by: NURSE PRACTITIONER

## 2023-01-19 PROCEDURE — 3077F SYST BP >= 140 MM HG: CPT | Mod: CPTII,S$GLB,, | Performed by: NURSE PRACTITIONER

## 2023-01-19 PROCEDURE — 99215 PR OFFICE/OUTPT VISIT, EST, LEVL V, 40-54 MIN: ICD-10-PCS | Mod: S$GLB,,, | Performed by: NURSE PRACTITIONER

## 2023-01-19 PROCEDURE — 3008F BODY MASS INDEX DOCD: CPT | Mod: CPTII,S$GLB,, | Performed by: NURSE PRACTITIONER

## 2023-01-19 PROCEDURE — 1159F MED LIST DOCD IN RCRD: CPT | Mod: CPTII,S$GLB,, | Performed by: NURSE PRACTITIONER

## 2023-01-19 RX ORDER — FAMOTIDINE 20 MG/1
20 TABLET, FILM COATED ORAL
Status: CANCELLED
Start: 2023-01-19

## 2023-01-19 RX ORDER — HEPARIN 100 UNIT/ML
500 SYRINGE INTRAVENOUS
Status: CANCELLED | OUTPATIENT
Start: 2023-01-19

## 2023-01-19 RX ORDER — DIPHENHYDRAMINE HCL 25 MG
25 CAPSULE ORAL
Status: CANCELLED
Start: 2023-01-19

## 2023-01-19 RX ORDER — SODIUM CHLORIDE 0.9 % (FLUSH) 0.9 %
10 SYRINGE (ML) INJECTION
Status: CANCELLED | OUTPATIENT
Start: 2023-01-19

## 2023-01-19 RX ORDER — DEXAMETHASONE SODIUM PHOSPHATE 4 MG/ML
20 INJECTION, SOLUTION INTRA-ARTICULAR; INTRALESIONAL; INTRAMUSCULAR; INTRAVENOUS; SOFT TISSUE
Status: CANCELLED
Start: 2023-01-19

## 2023-01-19 NOTE — PROGRESS NOTES
MEDICAL ONCOLOGY FOLLOW UP CONSULTATION NOTE    Patient ID: Tequila Hernandez is a 47 y.o. female.    Chief Complaint:  Breast cancer    HPI:  Patient is a 46-year-old female with past medical history of diabetes mellitus, hypertension with recent diagnosis of triple negative breast cancer for which she underwent left breast partial mastectomy and sentinel lymph node biopsy.  Patient reports recovering well from recent surgery and presents to our clinic today for further evaluation.  Voices no acute complaints.  She also has undergone genetic testing with surgery does of her young age and triple negative status.      Pathology:     10/7/22:  1. LEFT AXILLARY SENTINEL LYMPH NODE, EXCISIONAL BIOPSY:   - BENIGN REACTIVE SENTINEL LYMPH NODE EXAMINED, AND IT IS NEGATIVE FOR   METASTATIC CARCINOMA (0/1).   2. LEFT BREAST, LEFT SIMPLE MASTECTOMY:   - RESIDUAL INFILTRATING DUCTAL CARCINOMA, BOO GRADE 3, SIZE 13 MM, ASSOCIATED WITH PRIOR    BIOPSY SCAR/SEED MARKER, MARGINS UNINVOLVED IN THIS SPECIMEN, SEE TUMOR SUMMARY.   - NO EVIDENCE OF DCIS.   - NONPROLIFERATIVE FIBROCYSTIC CHANGES AND PSEUDOANGIOMATOUS   HYPERPLASIA(PASH) PRESENT      COMPRISING A GROSSLY EVIDENT 8 MM NODULE AS WELL INCIDENTAL SMALL   FIBROADENOMA..   3. BREAST, ADDITIONAL SUPERIOR MARGIN, MARGIN EXCISION:   - BENIGN MAMMARY TISSUE AND ADIPOSE TISSUE, NEGATIVE FOR TUMOR, MARGIN   CLEAR.   4. LEFT BREAST, ADDITIONAL MEDIAL MARGIN, MARGIN EXCISION:   - BENIGN MAMMARY TISSUE AND ADIPOSE TISSUE, NEGATIVE FOR TUMOR, MARGIN   CLEAR.   LEFT  BREAST TUMOR SUMMARY(INCORPORATES PARTS 1 TO 4):   SPECIMEN TYPE AND PROCEDURE:      Simple mastectomy with additional margin   excisions and sentinel node biopsy.   SPECIMEN LATERALITY:         Left.   TUMOR SITE (QUADRANT):         Not specified.   SPECIMEN INTEGRITY:         Intact    HISTOLOGIC TYPE:         Infiltrating ductal carcinoma.   COMBINED HISTOLOGIC GRADE:      [place] Grade 3   BOO HISTOLOGIC  "SCORE:      3,3,,3= total score 9 of 9    SIZE OF INVASIVE COMPONENT:      13 mm   TUMOR FOCALITY:         Unifocal.   TUMOR NECROSIS:         Not identified.   LYMPHOVASCULAR INVASION:      Not identified.   PERINEURAL INVASION:         Not identified   SKIN:               Not applicable (Skin is present and uninvolved.)   NIPPLE:               Not applicable (Nipple is not present.)   SIZE/TYPE/EXTENT INTRADUCTAL CANCER:  Not identified.        EXTENSIVE INTRADUCTAL COMPONENT (EIC): Not applicable (No DCIS).        NECROSIS:            Not applicable.   MICROCALCIFICATION:         Not identified   SURGICAL MARGINS:             INVASIVE:      Negative                IN SITU:      Not applicable.   DISTANCE TO CLOSEST MARGIN:      Invasive carcinoma extends to 3.5 mm of   superior margin.   LYMPH NODES:                 # TOTAL LYMPH NODES EXAMINED:   1  (1 sentinel in part 1)                 # SENTINEL NODES EXAMINED:   1                 # NODES WITH MACROMETASTASIS:   0                 # NODES WITH MICROMETASTASIS:   0    METASTASIS:            Not applicable.   TREATMENT EFFECT:         No known prior therapy.   TNM CODE:            pT1c  pN0(sn)  M-n/a  G3   ANCILLARY STUDIES: BREAST PROGNOSTIC PANEL: Per prior biopsy [id number], the   tumor is ER-, CO-, HER2 negative ("Triple Negative").   ___________________________________________________________________________    Imaging:     Past Medical History:   Diagnosis Date    Depression     Diabetes mellitus     DM (diabetes mellitus), type 2     Elderly multigravida     Hypertension      Family History   Problem Relation Age of Onset    Hypertension Mother     Diabetes Mother     Hyperlipidemia Mother     Hyperthyroidism Mother     Stroke Maternal Uncle     No Known Problems Father     No Known Problems Sister     No Known Problems Brother     No Known Problems Maternal Grandmother     No Known Problems Maternal Grandfather     No Known Problems Paternal Grandmother  "    No Known Problems Paternal Grandfather      Social History     Socioeconomic History    Marital status:    Tobacco Use    Smoking status: Never    Smokeless tobacco: Never   Substance and Sexual Activity    Alcohol use: Not Currently    Drug use: Never    Sexual activity: Yes     Partners: Male     Birth control/protection: None     Comment: history of IUD     Past Surgical History:   Procedure Laterality Date    LEFT OOPHORECTOMY Left 2012    left ovary removed         Review of systems:  Review of Systems   Constitutional:  Negative for activity change, appetite change, chills, diaphoresis, fatigue and unexpected weight change.   HENT:  Negative for congestion, facial swelling, hearing loss, mouth sores, trouble swallowing and voice change.    Eyes:  Negative for photophobia, pain, discharge and itching.   Respiratory:  Negative for apnea, cough, choking, chest tightness and shortness of breath.    Cardiovascular:  Negative for chest pain, palpitations and leg swelling.   Gastrointestinal:  Positive for nausea. Negative for abdominal distention, abdominal pain, anal bleeding and blood in stool.   Endocrine: Negative for cold intolerance, heat intolerance, polydipsia and polyphagia.   Genitourinary:  Negative for difficulty urinating, dysuria, flank pain and hematuria.   Musculoskeletal:  Negative for arthralgias, back pain, joint swelling, myalgias, neck pain and neck stiffness.        Positive for breast mass. See HPI   Skin:  Negative for color change, pallor and wound.   Allergic/Immunologic: Negative for environmental allergies, food allergies and immunocompromised state.   Neurological:  Negative for dizziness, seizures, facial asymmetry, speech difficulty, light-headedness, numbness and headaches.   Hematological:  Negative for adenopathy. Does not bruise/bleed easily.   Psychiatric/Behavioral:  Negative for agitation, behavioral problems, confusion, decreased concentration and sleep disturbance.         Physical Exam  Vitals and nursing note reviewed.   Constitutional:       General: She is not in acute distress.     Appearance: Normal appearance. She is not ill-appearing.   HENT:      Head: Normocephalic and atraumatic.      Nose: No congestion or rhinorrhea.   Eyes:      General: No scleral icterus.     Extraocular Movements: Extraocular movements intact.      Pupils: Pupils are equal, round, and reactive to light.   Cardiovascular:      Rate and Rhythm: Normal rate and regular rhythm.      Pulses: Normal pulses.      Heart sounds: Normal heart sounds. No murmur heard.    No gallop.   Pulmonary:      Effort: Pulmonary effort is normal. No respiratory distress.      Breath sounds: Normal breath sounds. No stridor. No wheezing or rhonchi.   Abdominal:      General: Bowel sounds are normal. There is no distension.      Palpations: There is no mass.      Tenderness: There is no abdominal tenderness. There is no guarding.   Musculoskeletal:         General: No swelling, tenderness, deformity or signs of injury. Normal range of motion.      Cervical back: Normal range of motion and neck supple. No rigidity. No muscular tenderness.      Right lower leg: No edema.      Left lower leg: No edema.   Skin:     General: Skin is warm.      Coloration: Skin is not jaundiced or pale.      Findings: No bruising or lesion.      Comments: Status post left breast lumpectomy.  Healing well   Neurological:      General: No focal deficit present.      Mental Status: She is alert and oriented to person, place, and time.      Cranial Nerves: No cranial nerve deficit.      Sensory: No sensory deficit.      Motor: No weakness.      Gait: Gait normal.   Psychiatric:         Mood and Affect: Mood normal.         Behavior: Behavior normal.         Thought Content: Thought content normal.                       Vitals:    01/19/23 0855   BP: (!) 162/81   Pulse: 99   Resp: 16   Temp: 99.4 °F (37.4 °C)   Body surface area is 1.73 meters  squared.    Assessment/Plan:      ECOG 1    Infiltrating ductal carcinoma arising from the left breast in female: Stage IB  == Triple negative.  Status post left breast lumpectomy and sentinel lymph node evaluation  ==  pT1c  pN0(sn)  M-n/a  G3.  Size of invasive component is more than 1 cm (13 mm).  Based on her triple negative status and young age my recommendation would be for adjuvant chemotherapy with dose dense AC x4 followed by weekly paclitaxel for 12 cycles.  Post lumpectomy and after completion of chemotherapy patient will be followed by radiation oncology for evaluation of radiation treatment.   == Patient will also need genetic testing and based on documentation from surgery she is agreeable to that.  == 10/19/22:  Patient here today to discuss upcoming chemotherapy, side effect profile, risk versus benefits, and expected outcomes have been discussed today. All questions and concerns answered and consents have been signed. Echo planned for tomorrow, port placement TBD. Plan to start treatment on 11/3/22 as she had lumpectomy 10/4/22.   ==11/3/22:  Status post port placement on Tuesday with Dr. Huerta, review of echo on 10/22/2022 shows EF> 55%, labs reviewed and patient is cleared to begin cycle 1 dose dense AC with Neulasta support as planned for today  ==11/17/22:  Tolerated cycle 1 of ddAC very well with no complaints other than mild nausea x1 day.  Patient was able to take Phenergan without any lingering effects.  Labs reviewed and patient is cleared for for today review of recent genetic testing showing no mutation identified.  ==12/15/22:  Tolerated cycle 3 dose dense AC fairly well with complaints of prolonged nausea which was not relieved with Phenergan and Zofran. Will send out compazine.  Patient is still able to work and is feeling good today.  Labs reviewed and patient is cleared for cycle 4 of 4 AC treatment as planned for today  ==12/29/22: Patient had reaction to Taxol. During first few  minutes of infusion she had flushing, ears felt hot, blood pressure increased to 174/90.  Taxol stopped, benadryl and solumedrol administered, patient was monitored per chemobay nurses. Will hence change her chemotherapy to Abraxane. PA request sent for weekly abraxane 80mg/m2 for 12 cycles  ==01/19/2023:  Pt here today to discuss upcoming chemotherapy, side effect profile, risk versus benefits, and expected outcomes have been discussed today. All questions and concerns answered and consents have been signed. She previously had reaction to Taxol.  Starting Abraxane x 12 weeks.  No complaints, labs reviewed and patient is cleared for treatment.  Will see in 1 week, if tolerates well then will move to every other week visits with weekly chemotherapy.    2. DMII  ==Continue to follow up with pcp  ==Continue Metformin per pcp  ==Will monitor for any neuropathy with Taxol, no baseline neuropathy reported    3. CINV  ==Continue Ondansetron, may alternate with phenergan for breakthrough nausea    4. Mucositis  ==Salt and soda rinse  ==Notify clinic if increase in mucositis and will send out magic mouthwash    Plan:  RTC in 1 week CBC, CMP, UPT and Chemotherapy          Total time spent in counseling and discussion about further management options including relevant lab work, treatment,  prognosis, medications and intended side effects was more than 60 minutes. More than 50% of the time was spent on counseling and coordination of care.  I spent a total of 60 minutes on the day of the visit.This includes face to face time and non-face to face time preparing to see the patient (eg, review of tests), Obtaining and/or reviewing separately obtained history, Documenting clinical information in the electronic or other health record, Independently interpreting resultsand communicating results to the patient/family/caregiver, or Care coordination.

## 2023-01-26 ENCOUNTER — OFFICE VISIT (OUTPATIENT)
Dept: HEMATOLOGY/ONCOLOGY | Facility: CLINIC | Age: 48
End: 2023-01-26
Payer: COMMERCIAL

## 2023-01-26 VITALS
DIASTOLIC BLOOD PRESSURE: 75 MMHG | HEART RATE: 86 BPM | OXYGEN SATURATION: 98 % | BODY MASS INDEX: 28.89 KG/M2 | TEMPERATURE: 100 F | RESPIRATION RATE: 16 BRPM | SYSTOLIC BLOOD PRESSURE: 124 MMHG | HEIGHT: 61 IN | WEIGHT: 153 LBS

## 2023-01-26 DIAGNOSIS — E11.9 DIABETES MELLITUS WITHOUT COMPLICATION: ICD-10-CM

## 2023-01-26 DIAGNOSIS — T45.1X5A CINV (CHEMOTHERAPY-INDUCED NAUSEA AND VOMITING): ICD-10-CM

## 2023-01-26 DIAGNOSIS — R11.2 CINV (CHEMOTHERAPY-INDUCED NAUSEA AND VOMITING): ICD-10-CM

## 2023-01-26 DIAGNOSIS — C50.919 TRIPLE NEGATIVE BREAST CANCER: Primary | ICD-10-CM

## 2023-01-26 PROCEDURE — 1159F MED LIST DOCD IN RCRD: CPT | Mod: CPTII,S$GLB,, | Performed by: NURSE PRACTITIONER

## 2023-01-26 PROCEDURE — 99214 PR OFFICE/OUTPT VISIT, EST, LEVL IV, 30-39 MIN: ICD-10-PCS | Mod: S$GLB,,, | Performed by: NURSE PRACTITIONER

## 2023-01-26 PROCEDURE — 3078F PR MOST RECENT DIASTOLIC BLOOD PRESSURE < 80 MM HG: ICD-10-PCS | Mod: CPTII,S$GLB,, | Performed by: NURSE PRACTITIONER

## 2023-01-26 PROCEDURE — 1159F PR MEDICATION LIST DOCUMENTED IN MEDICAL RECORD: ICD-10-PCS | Mod: CPTII,S$GLB,, | Performed by: NURSE PRACTITIONER

## 2023-01-26 PROCEDURE — 3074F PR MOST RECENT SYSTOLIC BLOOD PRESSURE < 130 MM HG: ICD-10-PCS | Mod: CPTII,S$GLB,, | Performed by: NURSE PRACTITIONER

## 2023-01-26 PROCEDURE — 3008F PR BODY MASS INDEX (BMI) DOCUMENTED: ICD-10-PCS | Mod: CPTII,S$GLB,, | Performed by: NURSE PRACTITIONER

## 2023-01-26 PROCEDURE — 99214 OFFICE O/P EST MOD 30 MIN: CPT | Mod: S$GLB,,, | Performed by: NURSE PRACTITIONER

## 2023-01-26 PROCEDURE — 3008F BODY MASS INDEX DOCD: CPT | Mod: CPTII,S$GLB,, | Performed by: NURSE PRACTITIONER

## 2023-01-26 PROCEDURE — 3078F DIAST BP <80 MM HG: CPT | Mod: CPTII,S$GLB,, | Performed by: NURSE PRACTITIONER

## 2023-01-26 PROCEDURE — 3074F SYST BP LT 130 MM HG: CPT | Mod: CPTII,S$GLB,, | Performed by: NURSE PRACTITIONER

## 2023-01-26 RX ORDER — SODIUM CHLORIDE 0.9 % (FLUSH) 0.9 %
10 SYRINGE (ML) INJECTION
Status: CANCELLED | OUTPATIENT
Start: 2023-01-26

## 2023-01-26 RX ORDER — DEXAMETHASONE SODIUM PHOSPHATE 4 MG/ML
20 INJECTION, SOLUTION INTRA-ARTICULAR; INTRALESIONAL; INTRAMUSCULAR; INTRAVENOUS; SOFT TISSUE
Status: CANCELLED
Start: 2023-01-26

## 2023-01-26 RX ORDER — FAMOTIDINE 20 MG/1
20 TABLET, FILM COATED ORAL
Status: CANCELLED
Start: 2023-01-26

## 2023-01-26 RX ORDER — DIPHENHYDRAMINE HCL 25 MG
25 CAPSULE ORAL
Status: CANCELLED
Start: 2023-01-26

## 2023-01-26 RX ORDER — HEPARIN 100 UNIT/ML
500 SYRINGE INTRAVENOUS
Status: CANCELLED | OUTPATIENT
Start: 2023-01-26

## 2023-02-02 RX ORDER — HEPARIN 100 UNIT/ML
500 SYRINGE INTRAVENOUS
Status: CANCELLED | OUTPATIENT
Start: 2023-02-02

## 2023-02-02 RX ORDER — DEXAMETHASONE SODIUM PHOSPHATE 4 MG/ML
20 INJECTION, SOLUTION INTRA-ARTICULAR; INTRALESIONAL; INTRAMUSCULAR; INTRAVENOUS; SOFT TISSUE
Status: CANCELLED
Start: 2023-02-02

## 2023-02-02 RX ORDER — FAMOTIDINE 20 MG/1
20 TABLET, FILM COATED ORAL
Status: CANCELLED
Start: 2023-02-02

## 2023-02-02 RX ORDER — DIPHENHYDRAMINE HCL 25 MG
25 CAPSULE ORAL
Status: CANCELLED
Start: 2023-02-02

## 2023-02-02 RX ORDER — SODIUM CHLORIDE 0.9 % (FLUSH) 0.9 %
10 SYRINGE (ML) INJECTION
Status: CANCELLED | OUTPATIENT
Start: 2023-02-02

## 2023-02-07 DIAGNOSIS — C50.919 TRIPLE NEGATIVE BREAST CANCER: Primary | ICD-10-CM

## 2023-02-08 LAB
ALBUMIN SERPL BCP-MCNC: 4 G/DL (ref 3.4–5)
ALBUMIN/GLOBULIN RATIO: 1.43 RATIO (ref 1.1–1.8)
ALP SERPL-CCNC: 50 U/L (ref 46–116)
ALT SERPL W P-5'-P-CCNC: 28 U/L (ref 12–78)
ANION GAP SERPL CALC-SCNC: 5 MMOL/L (ref 3–11)
AST SERPL-CCNC: 15 U/L (ref 15–37)
B-HCG UR QL: NEGATIVE
BASOPHILS NFR BLD: 0.5 % (ref 0–3)
BILIRUB SERPL-MCNC: 0.8 MG/DL (ref 0–1)
BUN SERPL-MCNC: 11 MG/DL (ref 7–18)
BUN/CREAT SERPL: 20.75 RATIO (ref 7–18)
CALCIUM SERPL-MCNC: 9 MG/DL (ref 8.8–10.5)
CHLORIDE SERPL-SCNC: 107 MMOL/L (ref 100–108)
CO2 SERPL-SCNC: 29 MMOL/L (ref 21–32)
CREAT SERPL-MCNC: 0.53 MG/DL (ref 0.55–1.02)
EOSINOPHIL NFR BLD: 4.8 % (ref 1–3)
ERYTHROCYTE [DISTWIDTH] IN BLOOD BY AUTOMATED COUNT: 13 % (ref 12.5–18)
GFR ESTIMATION: > 60
GLOBULIN: 2.8 G/DL (ref 2.3–3.5)
GLUCOSE SERPL-MCNC: 198 MG/DL (ref 70–110)
HCT VFR BLD AUTO: 34.4 % (ref 37–47)
HGB BLD-MCNC: 11.9 G/DL (ref 12–16)
LYMPHOCYTES NFR BLD: 21.3 % (ref 25–40)
MCH RBC QN AUTO: 31.2 PG (ref 27–31.2)
MCHC RBC AUTO-ENTMCNC: 34.6 G/DL (ref 31.8–35.4)
MCV RBC AUTO: 90.1 FL (ref 80–97)
MONOCYTES NFR BLD: 7.4 % (ref 1–15)
NEUTROPHILS # BLD AUTO: 2.57 10*3/UL (ref 1.8–7.7)
NEUTROPHILS NFR BLD: 65.2 % (ref 37–80)
NUCLEATED RED BLOOD CELLS: 0 %
PLATELETS: 241 10*3/UL (ref 142–424)
POTASSIUM SERPL-SCNC: 4 MMOL/L (ref 3.6–5.2)
PROT SERPL-MCNC: 6.8 G/DL (ref 6.4–8.2)
RBC # BLD AUTO: 3.82 10*6/UL (ref 4.2–5.4)
SODIUM BLD-SCNC: 141 MMOL/L (ref 135–145)
WBC # BLD: 3.9 10*3/UL (ref 4.6–10.2)

## 2023-02-09 ENCOUNTER — OFFICE VISIT (OUTPATIENT)
Dept: HEMATOLOGY/ONCOLOGY | Facility: CLINIC | Age: 48
End: 2023-02-09
Payer: COMMERCIAL

## 2023-02-09 VITALS
TEMPERATURE: 98 F | BODY MASS INDEX: 29.45 KG/M2 | HEIGHT: 61 IN | HEART RATE: 86 BPM | DIASTOLIC BLOOD PRESSURE: 84 MMHG | RESPIRATION RATE: 16 BRPM | OXYGEN SATURATION: 98 % | WEIGHT: 156 LBS | SYSTOLIC BLOOD PRESSURE: 132 MMHG

## 2023-02-09 DIAGNOSIS — C50.919 TRIPLE NEGATIVE BREAST CANCER: Primary | ICD-10-CM

## 2023-02-09 DIAGNOSIS — R11.2 CINV (CHEMOTHERAPY-INDUCED NAUSEA AND VOMITING): ICD-10-CM

## 2023-02-09 DIAGNOSIS — E11.9 DIABETES MELLITUS WITHOUT COMPLICATION: ICD-10-CM

## 2023-02-09 DIAGNOSIS — T45.1X5A CINV (CHEMOTHERAPY-INDUCED NAUSEA AND VOMITING): ICD-10-CM

## 2023-02-09 PROCEDURE — 3075F PR MOST RECENT SYSTOLIC BLOOD PRESS GE 130-139MM HG: ICD-10-PCS | Mod: CPTII,S$GLB,, | Performed by: NURSE PRACTITIONER

## 2023-02-09 PROCEDURE — 3008F BODY MASS INDEX DOCD: CPT | Mod: CPTII,S$GLB,, | Performed by: NURSE PRACTITIONER

## 2023-02-09 PROCEDURE — 99214 PR OFFICE/OUTPT VISIT, EST, LEVL IV, 30-39 MIN: ICD-10-PCS | Mod: S$GLB,,, | Performed by: NURSE PRACTITIONER

## 2023-02-09 PROCEDURE — 3079F PR MOST RECENT DIASTOLIC BLOOD PRESSURE 80-89 MM HG: ICD-10-PCS | Mod: CPTII,S$GLB,, | Performed by: NURSE PRACTITIONER

## 2023-02-09 PROCEDURE — 3008F PR BODY MASS INDEX (BMI) DOCUMENTED: ICD-10-PCS | Mod: CPTII,S$GLB,, | Performed by: NURSE PRACTITIONER

## 2023-02-09 PROCEDURE — 99214 OFFICE O/P EST MOD 30 MIN: CPT | Mod: S$GLB,,, | Performed by: NURSE PRACTITIONER

## 2023-02-09 PROCEDURE — 1159F MED LIST DOCD IN RCRD: CPT | Mod: CPTII,S$GLB,, | Performed by: NURSE PRACTITIONER

## 2023-02-09 PROCEDURE — 1159F PR MEDICATION LIST DOCUMENTED IN MEDICAL RECORD: ICD-10-PCS | Mod: CPTII,S$GLB,, | Performed by: NURSE PRACTITIONER

## 2023-02-09 PROCEDURE — 3079F DIAST BP 80-89 MM HG: CPT | Mod: CPTII,S$GLB,, | Performed by: NURSE PRACTITIONER

## 2023-02-09 PROCEDURE — 3075F SYST BP GE 130 - 139MM HG: CPT | Mod: CPTII,S$GLB,, | Performed by: NURSE PRACTITIONER

## 2023-02-09 RX ORDER — HEPARIN 100 UNIT/ML
500 SYRINGE INTRAVENOUS
Status: CANCELLED | OUTPATIENT
Start: 2023-02-09

## 2023-02-09 RX ORDER — FAMOTIDINE 20 MG/1
20 TABLET, FILM COATED ORAL
Status: CANCELLED
Start: 2023-02-16

## 2023-02-09 RX ORDER — HEPARIN 100 UNIT/ML
500 SYRINGE INTRAVENOUS
Status: CANCELLED | OUTPATIENT
Start: 2023-02-16

## 2023-02-09 RX ORDER — DEXAMETHASONE SODIUM PHOSPHATE 4 MG/ML
20 INJECTION, SOLUTION INTRA-ARTICULAR; INTRALESIONAL; INTRAMUSCULAR; INTRAVENOUS; SOFT TISSUE
Status: CANCELLED
Start: 2023-02-16

## 2023-02-09 RX ORDER — DEXAMETHASONE SODIUM PHOSPHATE 4 MG/ML
20 INJECTION, SOLUTION INTRA-ARTICULAR; INTRALESIONAL; INTRAMUSCULAR; INTRAVENOUS; SOFT TISSUE
Status: CANCELLED
Start: 2023-02-09

## 2023-02-09 RX ORDER — DIPHENHYDRAMINE HCL 25 MG
25 CAPSULE ORAL
Status: CANCELLED
Start: 2023-02-16

## 2023-02-09 RX ORDER — SODIUM CHLORIDE 0.9 % (FLUSH) 0.9 %
10 SYRINGE (ML) INJECTION
Status: CANCELLED | OUTPATIENT
Start: 2023-02-16

## 2023-02-09 RX ORDER — FAMOTIDINE 20 MG/1
20 TABLET, FILM COATED ORAL
Status: CANCELLED
Start: 2023-02-09

## 2023-02-09 RX ORDER — DIPHENHYDRAMINE HCL 25 MG
25 CAPSULE ORAL
Status: CANCELLED
Start: 2023-02-09

## 2023-02-09 RX ORDER — SODIUM CHLORIDE 0.9 % (FLUSH) 0.9 %
10 SYRINGE (ML) INJECTION
Status: CANCELLED | OUTPATIENT
Start: 2023-02-09

## 2023-02-09 NOTE — PROGRESS NOTES
MEDICAL ONCOLOGY FOLLOW UP CONSULTATION NOTE    Patient ID: Tequila Hernandez is a 47 y.o. female.    Chief Complaint:  Breast cancer    HPI:  Patient is a 47-year-old female with past medical history of diabetes mellitus, hypertension with recent diagnosis of triple negative breast cancer for which she underwent left breast partial mastectomy and sentinel lymph node biopsy.  Patient reports recovering well from recent surgery and presents to our clinic today for further evaluation.  Voices no acute complaints.  She also has undergone genetic testing with surgery does of her young age and triple negative status.      Pathology:     10/7/22:  1. LEFT AXILLARY SENTINEL LYMPH NODE, EXCISIONAL BIOPSY:   - BENIGN REACTIVE SENTINEL LYMPH NODE EXAMINED, AND IT IS NEGATIVE FOR   METASTATIC CARCINOMA (0/1).   2. LEFT BREAST, LEFT SIMPLE MASTECTOMY:   - RESIDUAL INFILTRATING DUCTAL CARCINOMA, BOO GRADE 3, SIZE 13 MM, ASSOCIATED WITH PRIOR    BIOPSY SCAR/SEED MARKER, MARGINS UNINVOLVED IN THIS SPECIMEN, SEE TUMOR SUMMARY.   - NO EVIDENCE OF DCIS.   - NONPROLIFERATIVE FIBROCYSTIC CHANGES AND PSEUDOANGIOMATOUS   HYPERPLASIA(PASH) PRESENT      COMPRISING A GROSSLY EVIDENT 8 MM NODULE AS WELL INCIDENTAL SMALL   FIBROADENOMA..   3. BREAST, ADDITIONAL SUPERIOR MARGIN, MARGIN EXCISION:   - BENIGN MAMMARY TISSUE AND ADIPOSE TISSUE, NEGATIVE FOR TUMOR, MARGIN   CLEAR.   4. LEFT BREAST, ADDITIONAL MEDIAL MARGIN, MARGIN EXCISION:   - BENIGN MAMMARY TISSUE AND ADIPOSE TISSUE, NEGATIVE FOR TUMOR, MARGIN   CLEAR.   LEFT  BREAST TUMOR SUMMARY(INCORPORATES PARTS 1 TO 4):   SPECIMEN TYPE AND PROCEDURE:      Simple mastectomy with additional margin   excisions and sentinel node biopsy.   SPECIMEN LATERALITY:         Left.   TUMOR SITE (QUADRANT):         Not specified.   SPECIMEN INTEGRITY:         Intact    HISTOLOGIC TYPE:         Infiltrating ductal carcinoma.   COMBINED HISTOLOGIC GRADE:      Jones Grade 3   BOO HISTOLOGIC  "SCORE:      3,3,,3= total score 9 of 9    SIZE OF INVASIVE COMPONENT:      13 mm   TUMOR FOCALITY:         Unifocal.   TUMOR NECROSIS:         Not identified.   LYMPHOVASCULAR INVASION:      Not identified.   PERINEURAL INVASION:         Not identified   SKIN:               Not applicable (Skin is present and uninvolved.)   NIPPLE:               Not applicable (Nipple is not present.)   SIZE/TYPE/EXTENT INTRADUCTAL CANCER:  Not identified.        EXTENSIVE INTRADUCTAL COMPONENT (EIC): Not applicable (No DCIS).        NECROSIS:            Not applicable.   MICROCALCIFICATION:         Not identified   SURGICAL MARGINS:             INVASIVE:      Negative                IN SITU:      Not applicable.   DISTANCE TO CLOSEST MARGIN:      Invasive carcinoma extends to 3.5 mm of   superior margin.   LYMPH NODES:                 # TOTAL LYMPH NODES EXAMINED:   1  (1 sentinel in part 1)                 # SENTINEL NODES EXAMINED:   1                 # NODES WITH MACROMETASTASIS:   0                 # NODES WITH MICROMETASTASIS:   0    METASTASIS:            Not applicable.   TREATMENT EFFECT:         No known prior therapy.   TNM CODE:            pT1c  pN0(sn)  M-n/a  G3   ANCILLARY STUDIES: BREAST PROGNOSTIC PANEL: Per prior biopsy [id number], the   tumor is ER-, MS-, HER2 negative ("Triple Negative").   ___________________________________________________________________________    Imaging:     Past Medical History:   Diagnosis Date    Depression     Diabetes mellitus     DM (diabetes mellitus), type 2     Elderly multigravida     Hypertension      Family History   Problem Relation Age of Onset    Hypertension Mother     Diabetes Mother     Hyperlipidemia Mother     Hyperthyroidism Mother     Stroke Maternal Uncle     No Known Problems Father     No Known Problems Sister     No Known Problems Brother     No Known Problems Maternal Grandmother     No Known Problems Maternal Grandfather     No Known Problems Paternal Grandmother  "    No Known Problems Paternal Grandfather      Social History     Socioeconomic History    Marital status:    Tobacco Use    Smoking status: Never    Smokeless tobacco: Never   Substance and Sexual Activity    Alcohol use: Not Currently    Drug use: Never    Sexual activity: Yes     Partners: Male     Birth control/protection: None     Comment: history of IUD     Past Surgical History:   Procedure Laterality Date    LEFT OOPHORECTOMY Left 2012    left ovary removed         Review of systems:  Review of Systems   Constitutional:  Negative for activity change, appetite change, chills, diaphoresis, fatigue and unexpected weight change.   HENT:  Negative for congestion, facial swelling, hearing loss, mouth sores, trouble swallowing and voice change.    Eyes:  Negative for photophobia, pain, discharge and itching.   Respiratory:  Negative for apnea, cough, choking, chest tightness and shortness of breath.    Cardiovascular:  Negative for chest pain, palpitations and leg swelling.   Gastrointestinal:  Positive for nausea. Negative for abdominal distention, abdominal pain, anal bleeding and blood in stool.   Endocrine: Negative for cold intolerance, heat intolerance, polydipsia and polyphagia.   Genitourinary:  Negative for difficulty urinating, dysuria, flank pain and hematuria.   Musculoskeletal:  Negative for arthralgias, back pain, joint swelling, myalgias, neck pain and neck stiffness.        Positive for breast mass. See HPI   Skin:  Negative for color change, pallor and wound.   Allergic/Immunologic: Negative for environmental allergies, food allergies and immunocompromised state.   Neurological:  Negative for dizziness, seizures, facial asymmetry, speech difficulty, light-headedness, numbness and headaches.   Hematological:  Negative for adenopathy. Does not bruise/bleed easily.   Psychiatric/Behavioral:  Negative for agitation, behavioral problems, confusion, decreased concentration and sleep disturbance.         Physical Exam  Vitals and nursing note reviewed.   Constitutional:       General: She is not in acute distress.     Appearance: Normal appearance. She is not ill-appearing.   HENT:      Head: Normocephalic and atraumatic.      Nose: No congestion or rhinorrhea.   Eyes:      General: No scleral icterus.     Extraocular Movements: Extraocular movements intact.      Pupils: Pupils are equal, round, and reactive to light.   Cardiovascular:      Rate and Rhythm: Normal rate and regular rhythm.      Pulses: Normal pulses.      Heart sounds: Normal heart sounds. No murmur heard.    No gallop.   Pulmonary:      Effort: Pulmonary effort is normal. No respiratory distress.      Breath sounds: Normal breath sounds. No stridor. No wheezing or rhonchi.   Abdominal:      General: Bowel sounds are normal. There is no distension.      Palpations: There is no mass.      Tenderness: There is no abdominal tenderness. There is no guarding.   Musculoskeletal:         General: No swelling, tenderness, deformity or signs of injury. Normal range of motion.      Cervical back: Normal range of motion and neck supple. No rigidity. No muscular tenderness.      Right lower leg: No edema.      Left lower leg: No edema.   Skin:     General: Skin is warm.      Coloration: Skin is not jaundiced or pale.      Findings: No bruising or lesion.      Comments: Status post left breast lumpectomy.  Healing well   Neurological:      General: No focal deficit present.      Mental Status: She is alert and oriented to person, place, and time.      Cranial Nerves: No cranial nerve deficit.      Sensory: No sensory deficit.      Motor: No weakness.      Gait: Gait normal.   Psychiatric:         Mood and Affect: Mood normal.         Behavior: Behavior normal.         Thought Content: Thought content normal.                       Vitals:    02/09/23 0835   BP: 132/84   Pulse: 86   Resp: 16   Temp: 97.6 °F (36.4 °C)     Body surface area is 1.75 meters  squared.    Assessment/Plan:      ECOG 1    Infiltrating ductal carcinoma arising from the left breast in female: Stage IB  == Triple negative.  Status post left breast lumpectomy and sentinel lymph node evaluation  ==  pT1c  pN0(sn)  M-n/a  G3.  Size of invasive component is more than 1 cm (13 mm).  Based on her triple negative status and young age my recommendation would be for adjuvant chemotherapy with dose dense AC x4 followed by weekly paclitaxel for 12 cycles.  Post lumpectomy and after completion of chemotherapy patient will be followed by radiation oncology for evaluation of radiation treatment.   == Patient will also need genetic testing and based on documentation from surgery she is agreeable to that.  == 10/19/22:  Patient here today to discuss upcoming chemotherapy, side effect profile, risk versus benefits, and expected outcomes have been discussed today. All questions and concerns answered and consents have been signed. Echo planned for tomorrow, port placement TBD. Plan to start treatment on 11/3/22 as she had lumpectomy 10/4/22.   ==11/3/22:  Status post port placement on Tuesday with Dr. Huerta, review of echo on 10/22/2022 shows EF> 55%, labs reviewed and patient is cleared to begin cycle 1 dose dense AC with Neulasta support as planned for today  ==11/17/22:  Tolerated cycle 1 of ddAC very well with no complaints other than mild nausea x1 day.  Patient was able to take Phenergan without any lingering effects.  Labs reviewed and patient is cleared for for today review of recent genetic testing showing no mutation identified.  ==12/15/22:  Tolerated cycle 3 dose dense AC fairly well with complaints of prolonged nausea which was not relieved with Phenergan and Zofran. Will send out compazine.  Patient is still able to work and is feeling good today.  Labs reviewed and patient is cleared for cycle 4 of 4 AC treatment as planned for today  ==12/29/22: Patient had reaction to Taxol. During first few  minutes of infusion she had flushing, ears felt hot, blood pressure increased to 174/90.  Taxol stopped, benadryl and solumedrol administered, patient was monitored per chemobay nurses. Will hence change her chemotherapy to Abraxane. PA request sent for weekly abraxane 80mg/m2 for 12 cycles  ==01/19/2023:  Pt here today to discuss upcoming chemotherapy, side effect profile, risk versus benefits, and expected outcomes have been discussed today. All questions and concerns answered and consents have been signed. She previously had reaction to Taxol.  Starting Abraxane x 12 weeks.    ==02/09/2023: Abraxane well tolerated, no n/v or neuropathy. She has increase in blood glucose and has appt with her endocrinologist to discuss.  Instructed to monitor daily capillary blood glucose and notify clinic or endocrinologist if continues to run high.    2. DMII  ==Continue to follow up with pcp  ==Continue Metformin per pcp  ==Will monitor for any neuropathy with Taxol, no baseline neuropathy reported    3. CINV  ==Continue Ondansetron, may alternate with phenergan for breakthrough nausea    4. Mucositis  ==Salt and soda rinse  ==Notify clinic if increase in mucositis and will send out magic mouthwash    Plan:  RTC 2 weeks np visit  Continue weekly chemo and labs          Total time spent in counseling and discussion about further management options including relevant lab work, treatment,  prognosis, medications and intended side effects was more than 60 minutes. More than 50% of the time was spent on counseling and coordination of care.  I spent a total of 60 minutes on the day of the visit.This includes face to face time and non-face to face time preparing to see the patient (eg, review of tests), Obtaining and/or reviewing separately obtained history, Documenting clinical information in the electronic or other health record, Independently interpreting resultsand communicating results to the patient/family/caregiver, or Care  coordination.

## 2023-02-13 ENCOUNTER — TELEPHONE (OUTPATIENT)
Dept: HEMATOLOGY/ONCOLOGY | Facility: CLINIC | Age: 48
End: 2023-02-13
Payer: COMMERCIAL

## 2023-02-13 NOTE — TELEPHONE ENCOUNTER
Spoke to the patient she states that she is having an elevated heart rate 124-136. States her BP is 136/95, O2 99, blood sugar 123 temperature 96.8, denies nausea,vomiting, and diarrhea. I let Ashlee Infante NP know the patients symptoms and she would like the patient to go to the ER to be evaluated. Patient states understanding. TTRN

## 2023-02-15 DIAGNOSIS — C50.919 TRIPLE NEGATIVE BREAST CANCER: ICD-10-CM

## 2023-02-22 LAB
ALBUMIN SERPL BCP-MCNC: 4 G/DL (ref 3.4–5)
ALBUMIN/GLOBULIN RATIO: 1.54 RATIO (ref 1.1–1.8)
ALP SERPL-CCNC: 56 U/L (ref 46–116)
ALT SERPL W P-5'-P-CCNC: 24 U/L (ref 12–78)
ANION GAP SERPL CALC-SCNC: 8 MMOL/L (ref 3–11)
AST SERPL-CCNC: 15 U/L (ref 15–37)
B-HCG UR QL: NEGATIVE
BASOPHILS NFR BLD: 0.6 % (ref 0–3)
BILIRUB SERPL-MCNC: 0.5 MG/DL (ref 0–1)
BUN SERPL-MCNC: 11 MG/DL (ref 7–18)
BUN/CREAT SERPL: 21.15 RATIO (ref 7–18)
CALCIUM SERPL-MCNC: 9.1 MG/DL (ref 8.8–10.5)
CHLORIDE SERPL-SCNC: 107 MMOL/L (ref 100–108)
CO2 SERPL-SCNC: 27 MMOL/L (ref 21–32)
CREAT SERPL-MCNC: 0.52 MG/DL (ref 0.55–1.02)
EOSINOPHIL NFR BLD: 2.1 % (ref 1–3)
ERYTHROCYTE [DISTWIDTH] IN BLOOD BY AUTOMATED COUNT: 12.1 % (ref 12.5–18)
GFR ESTIMATION: > 60
GLOBULIN: 2.6 G/DL (ref 2.3–3.5)
GLUCOSE SERPL-MCNC: 145 MG/DL (ref 70–110)
HCT VFR BLD AUTO: 33.5 % (ref 37–47)
HGB BLD-MCNC: 11.8 G/DL (ref 12–16)
LYMPHOCYTES NFR BLD: 21.4 % (ref 25–40)
MCH RBC QN AUTO: 31 PG (ref 27–31.2)
MCHC RBC AUTO-ENTMCNC: 35.2 G/DL (ref 31.8–35.4)
MCV RBC AUTO: 87.9 FL (ref 80–97)
MONOCYTES NFR BLD: 5.7 % (ref 1–15)
NEUTROPHILS # BLD AUTO: 4.27 10*3/UL (ref 1.8–7.7)
NEUTROPHILS NFR BLD: 69.2 % (ref 37–80)
NUCLEATED RED BLOOD CELLS: 0 %
PLATELETS: 316 10*3/UL (ref 142–424)
POTASSIUM SERPL-SCNC: 4 MMOL/L (ref 3.6–5.2)
PROT SERPL-MCNC: 6.6 G/DL (ref 6.4–8.2)
RBC # BLD AUTO: 3.81 10*6/UL (ref 4.2–5.4)
SODIUM BLD-SCNC: 142 MMOL/L (ref 135–145)
WBC # BLD: 6.2 10*3/UL (ref 4.6–10.2)

## 2023-02-23 ENCOUNTER — OFFICE VISIT (OUTPATIENT)
Dept: HEMATOLOGY/ONCOLOGY | Facility: CLINIC | Age: 48
End: 2023-02-23
Payer: COMMERCIAL

## 2023-02-23 VITALS
HEIGHT: 61 IN | OXYGEN SATURATION: 98 % | SYSTOLIC BLOOD PRESSURE: 127 MMHG | RESPIRATION RATE: 16 BRPM | BODY MASS INDEX: 29.07 KG/M2 | WEIGHT: 154 LBS | TEMPERATURE: 97 F | DIASTOLIC BLOOD PRESSURE: 80 MMHG | HEART RATE: 93 BPM

## 2023-02-23 DIAGNOSIS — T45.1X5A CINV (CHEMOTHERAPY-INDUCED NAUSEA AND VOMITING): ICD-10-CM

## 2023-02-23 DIAGNOSIS — R11.2 CINV (CHEMOTHERAPY-INDUCED NAUSEA AND VOMITING): ICD-10-CM

## 2023-02-23 DIAGNOSIS — E11.9 DIABETES MELLITUS WITHOUT COMPLICATION: ICD-10-CM

## 2023-02-23 DIAGNOSIS — C50.919 TRIPLE NEGATIVE BREAST CANCER: Primary | ICD-10-CM

## 2023-02-23 PROCEDURE — 99214 OFFICE O/P EST MOD 30 MIN: CPT | Mod: S$GLB,,, | Performed by: NURSE PRACTITIONER

## 2023-02-23 PROCEDURE — 3079F DIAST BP 80-89 MM HG: CPT | Mod: CPTII,S$GLB,, | Performed by: NURSE PRACTITIONER

## 2023-02-23 PROCEDURE — 4010F PR ACE/ARB THEARPY RXD/TAKEN: ICD-10-PCS | Mod: CPTII,S$GLB,, | Performed by: NURSE PRACTITIONER

## 2023-02-23 PROCEDURE — 3008F BODY MASS INDEX DOCD: CPT | Mod: CPTII,S$GLB,, | Performed by: NURSE PRACTITIONER

## 2023-02-23 PROCEDURE — 3008F PR BODY MASS INDEX (BMI) DOCUMENTED: ICD-10-PCS | Mod: CPTII,S$GLB,, | Performed by: NURSE PRACTITIONER

## 2023-02-23 PROCEDURE — 3074F PR MOST RECENT SYSTOLIC BLOOD PRESSURE < 130 MM HG: ICD-10-PCS | Mod: CPTII,S$GLB,, | Performed by: NURSE PRACTITIONER

## 2023-02-23 PROCEDURE — 99214 PR OFFICE/OUTPT VISIT, EST, LEVL IV, 30-39 MIN: ICD-10-PCS | Mod: S$GLB,,, | Performed by: NURSE PRACTITIONER

## 2023-02-23 PROCEDURE — 4010F ACE/ARB THERAPY RXD/TAKEN: CPT | Mod: CPTII,S$GLB,, | Performed by: NURSE PRACTITIONER

## 2023-02-23 PROCEDURE — 1159F MED LIST DOCD IN RCRD: CPT | Mod: CPTII,S$GLB,, | Performed by: NURSE PRACTITIONER

## 2023-02-23 PROCEDURE — 3074F SYST BP LT 130 MM HG: CPT | Mod: CPTII,S$GLB,, | Performed by: NURSE PRACTITIONER

## 2023-02-23 PROCEDURE — 1159F PR MEDICATION LIST DOCUMENTED IN MEDICAL RECORD: ICD-10-PCS | Mod: CPTII,S$GLB,, | Performed by: NURSE PRACTITIONER

## 2023-02-23 PROCEDURE — 3079F PR MOST RECENT DIASTOLIC BLOOD PRESSURE 80-89 MM HG: ICD-10-PCS | Mod: CPTII,S$GLB,, | Performed by: NURSE PRACTITIONER

## 2023-02-23 RX ORDER — DEXAMETHASONE SODIUM PHOSPHATE 4 MG/ML
20 INJECTION, SOLUTION INTRA-ARTICULAR; INTRALESIONAL; INTRAMUSCULAR; INTRAVENOUS; SOFT TISSUE
Status: CANCELLED
Start: 2023-03-02

## 2023-02-23 RX ORDER — FAMOTIDINE 20 MG/1
20 TABLET, FILM COATED ORAL
Status: CANCELLED
Start: 2023-02-23

## 2023-02-23 RX ORDER — HEPARIN 100 UNIT/ML
500 SYRINGE INTRAVENOUS
Status: CANCELLED | OUTPATIENT
Start: 2023-02-23

## 2023-02-23 RX ORDER — DIPHENHYDRAMINE HCL 25 MG
25 CAPSULE ORAL
Status: CANCELLED
Start: 2023-02-23

## 2023-02-23 RX ORDER — SODIUM CHLORIDE 0.9 % (FLUSH) 0.9 %
10 SYRINGE (ML) INJECTION
Status: CANCELLED | OUTPATIENT
Start: 2023-02-23

## 2023-02-23 RX ORDER — HEPARIN 100 UNIT/ML
500 SYRINGE INTRAVENOUS
Status: CANCELLED | OUTPATIENT
Start: 2023-03-02

## 2023-02-23 RX ORDER — SODIUM CHLORIDE 0.9 % (FLUSH) 0.9 %
10 SYRINGE (ML) INJECTION
Status: CANCELLED | OUTPATIENT
Start: 2023-03-02

## 2023-02-23 RX ORDER — DEXAMETHASONE SODIUM PHOSPHATE 4 MG/ML
20 INJECTION, SOLUTION INTRA-ARTICULAR; INTRALESIONAL; INTRAMUSCULAR; INTRAVENOUS; SOFT TISSUE
Status: CANCELLED
Start: 2023-02-23

## 2023-02-23 RX ORDER — FAMOTIDINE 20 MG/1
20 TABLET, FILM COATED ORAL
Status: CANCELLED
Start: 2023-03-02

## 2023-02-23 RX ORDER — DIPHENHYDRAMINE HCL 25 MG
25 CAPSULE ORAL
Status: CANCELLED
Start: 2023-03-02

## 2023-02-23 NOTE — PROGRESS NOTES
MEDICAL ONCOLOGY FOLLOW UP CONSULTATION NOTE    Patient ID: Tequila Hernandez is a 47 y.o. female.    Chief Complaint:  Breast cancer    HPI:  Patient is a 47-year-old female with past medical history of diabetes mellitus, hypertension with recent diagnosis of triple negative breast cancer for which she underwent left breast partial mastectomy and sentinel lymph node biopsy.  Patient reports recovering well from recent surgery and presents to our clinic today for further evaluation.  Voices no acute complaints.  She also has undergone genetic testing with surgery does of her young age and triple negative status.      Pathology:     10/7/22:  1. LEFT AXILLARY SENTINEL LYMPH NODE, EXCISIONAL BIOPSY:   - BENIGN REACTIVE SENTINEL LYMPH NODE EXAMINED, AND IT IS NEGATIVE FOR   METASTATIC CARCINOMA (0/1).   2. LEFT BREAST, LEFT SIMPLE MASTECTOMY:   - RESIDUAL INFILTRATING DUCTAL CARCINOMA, BOO GRADE 3, SIZE 13 MM, ASSOCIATED WITH PRIOR    BIOPSY SCAR/SEED MARKER, MARGINS UNINVOLVED IN THIS SPECIMEN, SEE TUMOR SUMMARY.   - NO EVIDENCE OF DCIS.   - NONPROLIFERATIVE FIBROCYSTIC CHANGES AND PSEUDOANGIOMATOUS   HYPERPLASIA(PASH) PRESENT      COMPRISING A GROSSLY EVIDENT 8 MM NODULE AS WELL INCIDENTAL SMALL   FIBROADENOMA..   3. BREAST, ADDITIONAL SUPERIOR MARGIN, MARGIN EXCISION:   - BENIGN MAMMARY TISSUE AND ADIPOSE TISSUE, NEGATIVE FOR TUMOR, MARGIN   CLEAR.   4. LEFT BREAST, ADDITIONAL MEDIAL MARGIN, MARGIN EXCISION:   - BENIGN MAMMARY TISSUE AND ADIPOSE TISSUE, NEGATIVE FOR TUMOR, MARGIN   CLEAR.   LEFT  BREAST TUMOR SUMMARY(INCORPORATES PARTS 1 TO 4):   SPECIMEN TYPE AND PROCEDURE:      Simple mastectomy with additional margin   excisions and sentinel node biopsy.   SPECIMEN LATERALITY:         Left.   TUMOR SITE (QUADRANT):         Not specified.   SPECIMEN INTEGRITY:         Intact    HISTOLOGIC TYPE:         Infiltrating ductal carcinoma.   COMBINED HISTOLOGIC GRADE:      Perry Grade 3   BOO HISTOLOGIC  "SCORE:      3,3,,3= total score 9 of 9    SIZE OF INVASIVE COMPONENT:      13 mm   TUMOR FOCALITY:         Unifocal.   TUMOR NECROSIS:         Not identified.   LYMPHOVASCULAR INVASION:      Not identified.   PERINEURAL INVASION:         Not identified   SKIN:               Not applicable (Skin is present and uninvolved.)   NIPPLE:               Not applicable (Nipple is not present.)   SIZE/TYPE/EXTENT INTRADUCTAL CANCER:  Not identified.        EXTENSIVE INTRADUCTAL COMPONENT (EIC): Not applicable (No DCIS).        NECROSIS:            Not applicable.   MICROCALCIFICATION:         Not identified   SURGICAL MARGINS:             INVASIVE:      Negative                IN SITU:      Not applicable.   DISTANCE TO CLOSEST MARGIN:      Invasive carcinoma extends to 3.5 mm of   superior margin.   LYMPH NODES:                 # TOTAL LYMPH NODES EXAMINED:   1  (1 sentinel in part 1)                 # SENTINEL NODES EXAMINED:   1                 # NODES WITH MACROMETASTASIS:   0                 # NODES WITH MICROMETASTASIS:   0    METASTASIS:            Not applicable.   TREATMENT EFFECT:         No known prior therapy.   TNM CODE:            pT1c  pN0(sn)  M-n/a  G3   ANCILLARY STUDIES: BREAST PROGNOSTIC PANEL: Per prior biopsy [id number], the   tumor is ER-, VT-, HER2 negative ("Triple Negative").   ___________________________________________________________________________    Imaging:     Past Medical History:   Diagnosis Date    Depression     Diabetes mellitus     DM (diabetes mellitus), type 2     Elderly multigravida     Hypertension      Family History   Problem Relation Age of Onset    Hypertension Mother     Diabetes Mother     Hyperlipidemia Mother     Hyperthyroidism Mother     Stroke Maternal Uncle     No Known Problems Father     No Known Problems Sister     No Known Problems Brother     No Known Problems Maternal Grandmother     No Known Problems Maternal Grandfather     No Known Problems Paternal Grandmother  "    No Known Problems Paternal Grandfather      Social History     Socioeconomic History    Marital status:    Tobacco Use    Smoking status: Never    Smokeless tobacco: Never   Substance and Sexual Activity    Alcohol use: Not Currently    Drug use: Never    Sexual activity: Yes     Partners: Male     Birth control/protection: None     Comment: history of IUD     Past Surgical History:   Procedure Laterality Date    LEFT OOPHORECTOMY Left 2012    left ovary removed         Review of systems:  Review of Systems   Constitutional:  Negative for activity change, appetite change, chills, diaphoresis, fatigue and unexpected weight change.   HENT:  Negative for congestion, facial swelling, hearing loss, mouth sores, trouble swallowing and voice change.    Eyes:  Negative for photophobia, pain, discharge and itching.   Respiratory:  Negative for apnea, cough, choking, chest tightness and shortness of breath.    Cardiovascular:  Negative for chest pain, palpitations and leg swelling.   Gastrointestinal:  Positive for nausea. Negative for abdominal distention, abdominal pain, anal bleeding and blood in stool.   Endocrine: Negative for cold intolerance, heat intolerance, polydipsia and polyphagia.   Genitourinary:  Negative for difficulty urinating, dysuria, flank pain and hematuria.   Musculoskeletal:  Negative for arthralgias, back pain, joint swelling, myalgias, neck pain and neck stiffness.        Positive for breast mass. See HPI   Skin:  Negative for color change, pallor and wound.   Allergic/Immunologic: Negative for environmental allergies, food allergies and immunocompromised state.   Neurological:  Negative for dizziness, seizures, facial asymmetry, speech difficulty, light-headedness, numbness and headaches.   Hematological:  Negative for adenopathy. Does not bruise/bleed easily.   Psychiatric/Behavioral:  Negative for agitation, behavioral problems, confusion, decreased concentration and sleep disturbance.         Physical Exam  Vitals and nursing note reviewed.   Constitutional:       General: She is not in acute distress.     Appearance: Normal appearance. She is not ill-appearing.   HENT:      Head: Normocephalic and atraumatic.      Nose: No congestion or rhinorrhea.   Eyes:      General: No scleral icterus.     Extraocular Movements: Extraocular movements intact.      Pupils: Pupils are equal, round, and reactive to light.   Cardiovascular:      Rate and Rhythm: Normal rate and regular rhythm.      Pulses: Normal pulses.      Heart sounds: Normal heart sounds. No murmur heard.    No gallop.   Pulmonary:      Effort: Pulmonary effort is normal. No respiratory distress.      Breath sounds: Normal breath sounds. No stridor. No wheezing or rhonchi.   Abdominal:      General: Bowel sounds are normal. There is no distension.      Palpations: There is no mass.      Tenderness: There is no abdominal tenderness. There is no guarding.   Musculoskeletal:         General: No swelling, tenderness, deformity or signs of injury. Normal range of motion.      Cervical back: Normal range of motion and neck supple. No rigidity. No muscular tenderness.      Right lower leg: No edema.      Left lower leg: No edema.   Skin:     General: Skin is warm.      Coloration: Skin is not jaundiced or pale.      Findings: No bruising or lesion.      Comments: Status post left breast lumpectomy.  Healing well   Neurological:      General: No focal deficit present.      Mental Status: She is alert and oriented to person, place, and time.      Cranial Nerves: No cranial nerve deficit.      Sensory: No sensory deficit.      Motor: No weakness.      Gait: Gait normal.   Psychiatric:         Mood and Affect: Mood normal.         Behavior: Behavior normal.         Thought Content: Thought content normal.                       Vitals:    02/23/23 0832   BP: 127/80   Pulse: 93   Resp: 16   Temp: 97.4 °F (36.3 °C)     Body surface area is 1.73 meters  squared.    Assessment/Plan:      ECOG 1    Infiltrating ductal carcinoma arising from the left breast in female: Stage IB  == Triple negative.  Status post left breast lumpectomy and sentinel lymph node evaluation  ==  pT1c  pN0(sn)  M-n/a  G3.  Size of invasive component is more than 1 cm (13 mm).  Based on her triple negative status and young age my recommendation would be for adjuvant chemotherapy with dose dense AC x4 followed by weekly paclitaxel for 12 cycles.  Post lumpectomy and after completion of chemotherapy patient will be followed by radiation oncology for evaluation of radiation treatment.   == Patient will also need genetic testing and based on documentation from surgery she is agreeable to that.  == 10/19/22:  Patient here today to discuss upcoming chemotherapy, side effect profile, risk versus benefits, and expected outcomes have been discussed today. All questions and concerns answered and consents have been signed. Echo planned for tomorrow, port placement TBD. Plan to start treatment on 11/3/22 as she had lumpectomy 10/4/22.   ==11/3/22:  Status post port placement on Tuesday with Dr. Huerta, review of echo on 10/22/2022 shows EF> 55%, labs reviewed and patient is cleared to begin cycle 1 dose dense AC with Neulasta support as planned for today  ==11/17/22:  Tolerated cycle 1 of ddAC very well with no complaints other than mild nausea x1 day.  Patient was able to take Phenergan without any lingering effects.  Labs reviewed and patient is cleared for for today review of recent genetic testing showing no mutation identified.  ==12/15/22:  Tolerated cycle 3 dose dense AC fairly well with complaints of prolonged nausea which was not relieved with Phenergan and Zofran. Will send out compazine.  Patient is still able to work and is feeling good today.  Labs reviewed and patient is cleared for cycle 4 of 4 AC treatment as planned for today  ==12/29/22: Patient had reaction to Taxol. During first few  minutes of infusion she had flushing, ears felt hot, blood pressure increased to 174/90.  Taxol stopped, benadryl and solumedrol administered, patient was monitored per chemobay nurses. Will hence change her chemotherapy to Abraxane. PA request sent for weekly abraxane 80mg/m2 for 12 cycles  ==01/19/2023:  Pt here today to discuss upcoming chemotherapy, side effect profile, risk versus benefits, and expected outcomes have been discussed today. All questions and concerns answered and consents have been signed. She previously had reaction to Taxol.  Starting Abraxane x 12 weeks.    ==02/09/2023: Abraxane well tolerated, no n/v or neuropathy. She has increase in blood glucose and has appt with her endocrinologist to discuss.  Instructed to monitor daily capillary blood glucose and notify clinic or endocrinologist if continues to run high.  ==02/23/2023: Patient saw [facility] regarding increase in blood glucose. She is on Ozempic and was out due to national shortage which is why her sugar was elevated, now returned to her baseline as she has restarted her Ozempic.  She also had respiratory virus on 2/10/23 which has resolved. No complaints at this time, labs reviewed, patient is cleared for treatment    2. DMII  ==Continue to follow up with pcp  ==Continue Metformin per pcp  ==Will monitor for any neuropathy with Taxol, no baseline neuropathy reported    3. CINV  ==Continue Ondansetron, may alternate with phenergan for breakthrough nausea    4. Mucositis  ==Salt and soda rinse  ==Notify clinic if increase in mucositis and will send out magic mouthwash    Plan:  RTC 2 weeks np visit  Continue weekly chemo and labs          Total time spent in counseling and discussion about further management options including relevant lab work, treatment,  prognosis, medications and intended side effects was more than 60 minutes. More than 50% of the time was spent on counseling and coordination of care.  I spent a total of  60 minutes on the day of the visit.This includes face to face time and non-face to face time preparing to see the patient (eg, review of tests), Obtaining and/or reviewing separately obtained history, Documenting clinical information in the electronic or other health record, Independently interpreting resultsand communicating results to the patient/family/caregiver, or Care coordination.

## 2023-03-09 ENCOUNTER — OFFICE VISIT (OUTPATIENT)
Dept: HEMATOLOGY/ONCOLOGY | Facility: CLINIC | Age: 48
End: 2023-03-09
Payer: COMMERCIAL

## 2023-03-09 VITALS
OXYGEN SATURATION: 98 % | SYSTOLIC BLOOD PRESSURE: 119 MMHG | RESPIRATION RATE: 16 BRPM | HEART RATE: 100 BPM | WEIGHT: 155 LBS | TEMPERATURE: 98 F | DIASTOLIC BLOOD PRESSURE: 80 MMHG | HEIGHT: 61 IN | BODY MASS INDEX: 29.27 KG/M2

## 2023-03-09 DIAGNOSIS — C50.919 TRIPLE NEGATIVE BREAST CANCER: ICD-10-CM

## 2023-03-09 DIAGNOSIS — C50.919 TRIPLE NEGATIVE BREAST CANCER: Primary | ICD-10-CM

## 2023-03-09 PROCEDURE — 3079F PR MOST RECENT DIASTOLIC BLOOD PRESSURE 80-89 MM HG: ICD-10-PCS | Mod: CPTII,S$GLB,, | Performed by: NURSE PRACTITIONER

## 2023-03-09 PROCEDURE — 4010F PR ACE/ARB THEARPY RXD/TAKEN: ICD-10-PCS | Mod: CPTII,S$GLB,, | Performed by: NURSE PRACTITIONER

## 2023-03-09 PROCEDURE — 1160F PR REVIEW ALL MEDS BY PRESCRIBER/CLIN PHARMACIST DOCUMENTED: ICD-10-PCS | Mod: CPTII,S$GLB,, | Performed by: NURSE PRACTITIONER

## 2023-03-09 PROCEDURE — 99214 OFFICE O/P EST MOD 30 MIN: CPT | Mod: S$GLB,,, | Performed by: NURSE PRACTITIONER

## 2023-03-09 PROCEDURE — 1160F RVW MEDS BY RX/DR IN RCRD: CPT | Mod: CPTII,S$GLB,, | Performed by: NURSE PRACTITIONER

## 2023-03-09 PROCEDURE — 1159F PR MEDICATION LIST DOCUMENTED IN MEDICAL RECORD: ICD-10-PCS | Mod: CPTII,S$GLB,, | Performed by: NURSE PRACTITIONER

## 2023-03-09 PROCEDURE — 3008F BODY MASS INDEX DOCD: CPT | Mod: CPTII,S$GLB,, | Performed by: NURSE PRACTITIONER

## 2023-03-09 PROCEDURE — 4010F ACE/ARB THERAPY RXD/TAKEN: CPT | Mod: CPTII,S$GLB,, | Performed by: NURSE PRACTITIONER

## 2023-03-09 PROCEDURE — 3074F PR MOST RECENT SYSTOLIC BLOOD PRESSURE < 130 MM HG: ICD-10-PCS | Mod: CPTII,S$GLB,, | Performed by: NURSE PRACTITIONER

## 2023-03-09 PROCEDURE — 3008F PR BODY MASS INDEX (BMI) DOCUMENTED: ICD-10-PCS | Mod: CPTII,S$GLB,, | Performed by: NURSE PRACTITIONER

## 2023-03-09 PROCEDURE — 99214 PR OFFICE/OUTPT VISIT, EST, LEVL IV, 30-39 MIN: ICD-10-PCS | Mod: S$GLB,,, | Performed by: NURSE PRACTITIONER

## 2023-03-09 PROCEDURE — 3079F DIAST BP 80-89 MM HG: CPT | Mod: CPTII,S$GLB,, | Performed by: NURSE PRACTITIONER

## 2023-03-09 PROCEDURE — 3074F SYST BP LT 130 MM HG: CPT | Mod: CPTII,S$GLB,, | Performed by: NURSE PRACTITIONER

## 2023-03-09 PROCEDURE — 1159F MED LIST DOCD IN RCRD: CPT | Mod: CPTII,S$GLB,, | Performed by: NURSE PRACTITIONER

## 2023-03-09 RX ORDER — FAMOTIDINE 20 MG/1
20 TABLET, FILM COATED ORAL
Status: CANCELLED
Start: 2023-03-09

## 2023-03-09 RX ORDER — DIPHENHYDRAMINE HCL 25 MG
25 CAPSULE ORAL
Status: CANCELLED
Start: 2023-03-16

## 2023-03-09 RX ORDER — HEPARIN 100 UNIT/ML
500 SYRINGE INTRAVENOUS
Status: CANCELLED | OUTPATIENT
Start: 2023-03-09

## 2023-03-09 RX ORDER — SODIUM CHLORIDE 0.9 % (FLUSH) 0.9 %
10 SYRINGE (ML) INJECTION
Status: CANCELLED | OUTPATIENT
Start: 2023-03-09

## 2023-03-09 RX ORDER — DIPHENHYDRAMINE HCL 25 MG
25 CAPSULE ORAL
Status: CANCELLED
Start: 2023-03-09

## 2023-03-09 RX ORDER — FAMOTIDINE 20 MG/1
20 TABLET, FILM COATED ORAL
Status: CANCELLED
Start: 2023-03-16

## 2023-03-09 RX ORDER — SODIUM CHLORIDE 0.9 % (FLUSH) 0.9 %
10 SYRINGE (ML) INJECTION
Status: CANCELLED | OUTPATIENT
Start: 2023-03-16

## 2023-03-09 RX ORDER — DEXAMETHASONE SODIUM PHOSPHATE 4 MG/ML
20 INJECTION, SOLUTION INTRA-ARTICULAR; INTRALESIONAL; INTRAMUSCULAR; INTRAVENOUS; SOFT TISSUE
Status: CANCELLED
Start: 2023-03-16

## 2023-03-09 RX ORDER — HEPARIN 100 UNIT/ML
500 SYRINGE INTRAVENOUS
Status: CANCELLED | OUTPATIENT
Start: 2023-03-16

## 2023-03-09 RX ORDER — DEXAMETHASONE SODIUM PHOSPHATE 4 MG/ML
20 INJECTION, SOLUTION INTRA-ARTICULAR; INTRALESIONAL; INTRAMUSCULAR; INTRAVENOUS; SOFT TISSUE
Status: CANCELLED
Start: 2023-03-09

## 2023-03-09 NOTE — PROGRESS NOTES
MEDICAL ONCOLOGY FOLLOW UP CONSULTATION NOTE    Patient ID: Tequila Hernandez is a 47 y.o. female.    Chief Complaint:  Breast cancer    HPI:  Patient is a 47-year-old female with past medical history of diabetes mellitus, hypertension with recent diagnosis of triple negative breast cancer for which she underwent left breast partial mastectomy and sentinel lymph node biopsy.  Patient reports recovering well from recent surgery and presents to our clinic today for further evaluation.  Voices no acute complaints.  She also has undergone genetic testing with surgery does of her young age and triple negative status.      Pathology:     10/7/22:  1. LEFT AXILLARY SENTINEL LYMPH NODE, EXCISIONAL BIOPSY:   - BENIGN REACTIVE SENTINEL LYMPH NODE EXAMINED, AND IT IS NEGATIVE FOR   METASTATIC CARCINOMA (0/1).   2. LEFT BREAST, LEFT SIMPLE MASTECTOMY:   - RESIDUAL INFILTRATING DUCTAL CARCINOMA, BOO GRADE 3, SIZE 13 MM, ASSOCIATED WITH PRIOR    BIOPSY SCAR/SEED MARKER, MARGINS UNINVOLVED IN THIS SPECIMEN, SEE TUMOR SUMMARY.   - NO EVIDENCE OF DCIS.   - NONPROLIFERATIVE FIBROCYSTIC CHANGES AND PSEUDOANGIOMATOUS   HYPERPLASIA(PASH) PRESENT      COMPRISING A GROSSLY EVIDENT 8 MM NODULE AS WELL INCIDENTAL SMALL   FIBROADENOMA..   3. BREAST, ADDITIONAL SUPERIOR MARGIN, MARGIN EXCISION:   - BENIGN MAMMARY TISSUE AND ADIPOSE TISSUE, NEGATIVE FOR TUMOR, MARGIN   CLEAR.   4. LEFT BREAST, ADDITIONAL MEDIAL MARGIN, MARGIN EXCISION:   - BENIGN MAMMARY TISSUE AND ADIPOSE TISSUE, NEGATIVE FOR TUMOR, MARGIN   CLEAR.   LEFT  BREAST TUMOR SUMMARY(INCORPORATES PARTS 1 TO 4):   SPECIMEN TYPE AND PROCEDURE:      Simple mastectomy with additional margin   excisions and sentinel node biopsy.   SPECIMEN LATERALITY:         Left.   TUMOR SITE (QUADRANT):         Not specified.   SPECIMEN INTEGRITY:         Intact    HISTOLOGIC TYPE:         Infiltrating ductal carcinoma.   COMBINED HISTOLOGIC GRADE:      [place] Grade 3   BOO HISTOLOGIC  "SCORE:      3,3,,3= total score 9 of 9    SIZE OF INVASIVE COMPONENT:      13 mm   TUMOR FOCALITY:         Unifocal.   TUMOR NECROSIS:         Not identified.   LYMPHOVASCULAR INVASION:      Not identified.   PERINEURAL INVASION:         Not identified   SKIN:               Not applicable (Skin is present and uninvolved.)   NIPPLE:               Not applicable (Nipple is not present.)   SIZE/TYPE/EXTENT INTRADUCTAL CANCER:  Not identified.        EXTENSIVE INTRADUCTAL COMPONENT (EIC): Not applicable (No DCIS).        NECROSIS:            Not applicable.   MICROCALCIFICATION:         Not identified   SURGICAL MARGINS:             INVASIVE:      Negative                IN SITU:      Not applicable.   DISTANCE TO CLOSEST MARGIN:      Invasive carcinoma extends to 3.5 mm of   superior margin.   LYMPH NODES:                 # TOTAL LYMPH NODES EXAMINED:   1  (1 sentinel in part 1)                 # SENTINEL NODES EXAMINED:   1                 # NODES WITH MACROMETASTASIS:   0                 # NODES WITH MICROMETASTASIS:   0    METASTASIS:            Not applicable.   TREATMENT EFFECT:         No known prior therapy.   TNM CODE:            pT1c  pN0(sn)  M-n/a  G3   ANCILLARY STUDIES: BREAST PROGNOSTIC PANEL: Per prior biopsy [id number], the   tumor is ER-, LA-, HER2 negative ("Triple Negative").   ___________________________________________________________________________    Imaging:     Past Medical History:   Diagnosis Date    Depression     Diabetes mellitus     DM (diabetes mellitus), type 2     Elderly multigravida     Hypertension      Family History   Problem Relation Age of Onset    Hypertension Mother     Diabetes Mother     Hyperlipidemia Mother     Hyperthyroidism Mother     Stroke Maternal Uncle     No Known Problems Father     No Known Problems Sister     No Known Problems Brother     No Known Problems Maternal Grandmother     No Known Problems Maternal Grandfather     No Known Problems Paternal Grandmother  "    No Known Problems Paternal Grandfather      Social History     Socioeconomic History    Marital status:    Tobacco Use    Smoking status: Never    Smokeless tobacco: Never   Substance and Sexual Activity    Alcohol use: Not Currently    Drug use: Never    Sexual activity: Yes     Partners: Male     Birth control/protection: None     Comment: history of IUD     Past Surgical History:   Procedure Laterality Date    LEFT OOPHORECTOMY Left 2012    left ovary removed         Review of systems:  Review of Systems   Constitutional:  Negative for activity change, appetite change, chills, diaphoresis, fatigue and unexpected weight change.   HENT:  Negative for congestion, facial swelling, hearing loss, mouth sores, trouble swallowing and voice change.    Eyes:  Negative for photophobia, pain, discharge and itching.   Respiratory:  Negative for apnea, cough, choking, chest tightness and shortness of breath.    Cardiovascular:  Negative for chest pain, palpitations and leg swelling.   Gastrointestinal:  Positive for nausea. Negative for abdominal distention, abdominal pain, anal bleeding and blood in stool.   Endocrine: Negative for cold intolerance, heat intolerance, polydipsia and polyphagia.   Genitourinary:  Negative for difficulty urinating, dysuria, flank pain and hematuria.   Musculoskeletal:  Negative for arthralgias, back pain, joint swelling, myalgias, neck pain and neck stiffness.        Positive for breast mass. See HPI   Skin:  Negative for color change, pallor and wound.   Allergic/Immunologic: Negative for environmental allergies, food allergies and immunocompromised state.   Neurological:  Negative for dizziness, seizures, facial asymmetry, speech difficulty, light-headedness, numbness and headaches.   Hematological:  Negative for adenopathy. Does not bruise/bleed easily.   Psychiatric/Behavioral:  Negative for agitation, behavioral problems, confusion, decreased concentration and sleep disturbance.         Physical Exam  Vitals and nursing note reviewed.   Constitutional:       General: She is not in acute distress.     Appearance: Normal appearance. She is not ill-appearing.   HENT:      Head: Normocephalic and atraumatic.      Nose: No congestion or rhinorrhea.   Eyes:      General: No scleral icterus.     Extraocular Movements: Extraocular movements intact.      Pupils: Pupils are equal, round, and reactive to light.   Cardiovascular:      Rate and Rhythm: Normal rate and regular rhythm.      Pulses: Normal pulses.      Heart sounds: Normal heart sounds. No murmur heard.    No gallop.   Pulmonary:      Effort: Pulmonary effort is normal. No respiratory distress.      Breath sounds: Normal breath sounds. No stridor. No wheezing or rhonchi.   Abdominal:      General: Bowel sounds are normal. There is no distension.      Palpations: There is no mass.      Tenderness: There is no abdominal tenderness. There is no guarding.   Musculoskeletal:         General: No swelling, tenderness, deformity or signs of injury. Normal range of motion.      Cervical back: Normal range of motion and neck supple. No rigidity. No muscular tenderness.      Right lower leg: No edema.      Left lower leg: No edema.   Skin:     General: Skin is warm.      Coloration: Skin is not jaundiced or pale.      Findings: No bruising or lesion.      Comments: Status post left breast lumpectomy.  Healing well   Neurological:      General: No focal deficit present.      Mental Status: She is alert and oriented to person, place, and time.      Cranial Nerves: No cranial nerve deficit.      Sensory: No sensory deficit.      Motor: No weakness.      Gait: Gait normal.   Psychiatric:         Mood and Affect: Mood normal.         Behavior: Behavior normal.         Thought Content: Thought content normal.                       Vitals:    03/09/23 0850   BP: 119/80   Pulse: 100   Resp: 16   Temp: 97.6 °F (36.4 °C)     Body surface area is 1.74 meters  squared.    Assessment/Plan:      ECOG 1    Infiltrating ductal carcinoma arising from the left breast in female: Stage IB  == Triple negative.  Status post left breast lumpectomy and sentinel lymph node evaluation  ==  pT1c  pN0(sn)  M-n/a  G3.  Size of invasive component is more than 1 cm (13 mm).  Based on her triple negative status and young age my recommendation would be for adjuvant chemotherapy with dose dense AC x4 followed by weekly paclitaxel for 12 cycles.  Post lumpectomy and after completion of chemotherapy patient will be followed by radiation oncology for evaluation of radiation treatment.   == Patient will also need genetic testing and based on documentation from surgery she is agreeable to that.  == 10/19/22:  Patient here today to discuss upcoming chemotherapy, side effect profile, risk versus benefits, and expected outcomes have been discussed today. All questions and concerns answered and consents have been signed. Echo planned for tomorrow, port placement TBD. Plan to start treatment on 11/3/22 as she had lumpectomy 10/4/22.   ==11/3/22:  Status post port placement on Tuesday with Dr. Huerta, review of echo on 10/22/2022 shows EF> 55%, labs reviewed and patient is cleared to begin cycle 1 dose dense AC with Neulasta support as planned for today  ==11/17/22:  Tolerated cycle 1 of ddAC very well with no complaints other than mild nausea x1 day.  Patient was able to take Phenergan without any lingering effects.  Labs reviewed and patient is cleared for for today review of recent genetic testing showing no mutation identified.  ==12/15/22:  Tolerated cycle 3 dose dense AC fairly well with complaints of prolonged nausea which was not relieved with Phenergan and Zofran. Will send out compazine.  Patient is still able to work and is feeling good today.  Labs reviewed and patient is cleared for cycle 4 of 4 AC treatment as planned for today  ==12/29/22: Patient had reaction to Taxol. During first few  minutes of infusion she had flushing, ears felt hot, blood pressure increased to 174/90.  Taxol stopped, benadryl and solumedrol administered, patient was monitored per chemobay nurses. Will hence change her chemotherapy to Abraxane. PA request sent for weekly abraxane 80mg/m2 for 12 cycles  ==01/19/2023:  Pt here today to discuss upcoming chemotherapy, side effect profile, risk versus benefits, and expected outcomes have been discussed today. All questions and concerns answered and consents have been signed. She previously had reaction to Taxol.  Starting Abraxane x 12 weeks.    ==02/09/2023: Abraxane well tolerated, no n/v or neuropathy. She has increase in blood glucose and has appt with her endocrinologist to discuss.  Instructed to monitor daily capillary blood glucose and notify clinic or endocrinologist if continues to run high.  ==02/23/2023: Patient saw [facility] regarding increase in blood glucose. She is on Ozempic and was out due to national shortage which is why her sugar was elevated, now returned to her baseline as she has restarted her Ozempic.  She also had respiratory virus on 2/10/23 which has resolved. No complaints at this time, labs reviewed, patient is cleared for treatment  == 3/9/23:  Doing well, feeling very good.  No neuropathy with Abraxane has been noted.  Patient is continuing to work 4 days a week.  Labs reviewed and patient is cleared for treatment as planned for today    2. DMII  ==Continue to follow up with pcp  ==Continue Metformin per pcp  ==Will monitor for any neuropathy with Taxol, no baseline neuropathy reported    3. CINV  ==Continue Ondansetron, may alternate with phenergan for breakthrough nausea    4. Mucositis  == resolved    Plan:  RTC 2 weeks np visit  Continue weekly chemo and labs          Total time spent in counseling and discussion about further management options including relevant lab work, treatment,  prognosis, medications and intended side effects  was more than 60 minutes. More than 50% of the time was spent on counseling and coordination of care.  I spent a total of 60 minutes on the day of the visit.This includes face to face time and non-face to face time preparing to see the patient (eg, review of tests), Obtaining and/or reviewing separately obtained history, Documenting clinical information in the electronic or other health record, Independently interpreting resultsand communicating results to the patient/family/caregiver, or Care coordination.

## 2023-03-15 LAB
ALBUMIN SERPL BCP-MCNC: 4 G/DL (ref 3.4–5)
ALBUMIN/GLOBULIN RATIO: 1.25 RATIO (ref 1.1–1.8)
ALP SERPL-CCNC: 54 U/L (ref 46–116)
ALT SERPL W P-5'-P-CCNC: 17 U/L (ref 12–78)
ANION GAP SERPL CALC-SCNC: 10 MMOL/L (ref 3–11)
AST SERPL-CCNC: 13 U/L (ref 15–37)
B-HCG UR QL: NEGATIVE
BASOPHILS NFR BLD: 0.7 % (ref 0–3)
BILIRUB SERPL-MCNC: 0.6 MG/DL (ref 0–1)
BUN SERPL-MCNC: 9 MG/DL (ref 7–18)
BUN/CREAT SERPL: 12.5 RATIO (ref 7–18)
CALCIUM SERPL-MCNC: 8.8 MG/DL (ref 8.8–10.5)
CHLORIDE SERPL-SCNC: 106 MMOL/L (ref 100–108)
CO2 SERPL-SCNC: 26 MMOL/L (ref 21–32)
CREAT SERPL-MCNC: 0.72 MG/DL (ref 0.55–1.02)
EOSINOPHIL NFR BLD: 3.2 % (ref 1–3)
ERYTHROCYTE [DISTWIDTH] IN BLOOD BY AUTOMATED COUNT: 12.8 % (ref 12.5–18)
GFR ESTIMATION: > 60
GLOBULIN: 3.2 G/DL (ref 2.3–3.5)
GLUCOSE SERPL-MCNC: 217 MG/DL (ref 70–110)
HCT VFR BLD AUTO: 36.1 % (ref 37–47)
HGB BLD-MCNC: 12.5 G/DL (ref 12–16)
LYMPHOCYTES NFR BLD: 17.6 % (ref 25–40)
MCH RBC QN AUTO: 30.3 PG (ref 27–31.2)
MCHC RBC AUTO-ENTMCNC: 34.6 G/DL (ref 31.8–35.4)
MCV RBC AUTO: 87.6 FL (ref 80–97)
MONOCYTES NFR BLD: 5.8 % (ref 1–15)
NEUTROPHILS # BLD AUTO: 4.32 10*3/UL (ref 1.8–7.7)
NEUTROPHILS NFR BLD: 71.7 % (ref 37–80)
NUCLEATED RED BLOOD CELLS: 0 %
PLATELETS: 304 10*3/UL (ref 142–424)
POTASSIUM SERPL-SCNC: 3.8 MMOL/L (ref 3.6–5.2)
PROT SERPL-MCNC: 7.2 G/DL (ref 6.4–8.2)
RBC # BLD AUTO: 4.12 10*6/UL (ref 4.2–5.4)
SODIUM BLD-SCNC: 142 MMOL/L (ref 135–145)
WBC # BLD: 6 10*3/UL (ref 4.6–10.2)

## 2023-03-21 DIAGNOSIS — C50.919 TRIPLE NEGATIVE BREAST CANCER: ICD-10-CM

## 2023-03-22 LAB — B-HCG UR QL: NEGATIVE

## 2023-03-23 ENCOUNTER — OFFICE VISIT (OUTPATIENT)
Dept: HEMATOLOGY/ONCOLOGY | Facility: CLINIC | Age: 48
End: 2023-03-23
Payer: COMMERCIAL

## 2023-03-23 VITALS
WEIGHT: 156 LBS | TEMPERATURE: 98 F | OXYGEN SATURATION: 98 % | HEIGHT: 61 IN | SYSTOLIC BLOOD PRESSURE: 123 MMHG | RESPIRATION RATE: 15 BRPM | DIASTOLIC BLOOD PRESSURE: 83 MMHG | HEART RATE: 97 BPM | BODY MASS INDEX: 29.45 KG/M2

## 2023-03-23 DIAGNOSIS — C50.919 TRIPLE NEGATIVE BREAST CANCER: Primary | ICD-10-CM

## 2023-03-23 DIAGNOSIS — C50.919 TRIPLE NEGATIVE BREAST CANCER: ICD-10-CM

## 2023-03-23 DIAGNOSIS — G62.9 NEUROPATHY: ICD-10-CM

## 2023-03-23 PROCEDURE — 3074F PR MOST RECENT SYSTOLIC BLOOD PRESSURE < 130 MM HG: ICD-10-PCS | Mod: CPTII,S$GLB,, | Performed by: NURSE PRACTITIONER

## 2023-03-23 PROCEDURE — 3008F BODY MASS INDEX DOCD: CPT | Mod: CPTII,S$GLB,, | Performed by: NURSE PRACTITIONER

## 2023-03-23 PROCEDURE — 1159F PR MEDICATION LIST DOCUMENTED IN MEDICAL RECORD: ICD-10-PCS | Mod: CPTII,S$GLB,, | Performed by: NURSE PRACTITIONER

## 2023-03-23 PROCEDURE — 99214 OFFICE O/P EST MOD 30 MIN: CPT | Mod: S$GLB,,, | Performed by: NURSE PRACTITIONER

## 2023-03-23 PROCEDURE — 1159F MED LIST DOCD IN RCRD: CPT | Mod: CPTII,S$GLB,, | Performed by: NURSE PRACTITIONER

## 2023-03-23 PROCEDURE — 4010F ACE/ARB THERAPY RXD/TAKEN: CPT | Mod: CPTII,S$GLB,, | Performed by: NURSE PRACTITIONER

## 2023-03-23 PROCEDURE — 99214 PR OFFICE/OUTPT VISIT, EST, LEVL IV, 30-39 MIN: ICD-10-PCS | Mod: S$GLB,,, | Performed by: NURSE PRACTITIONER

## 2023-03-23 PROCEDURE — 3079F PR MOST RECENT DIASTOLIC BLOOD PRESSURE 80-89 MM HG: ICD-10-PCS | Mod: CPTII,S$GLB,, | Performed by: NURSE PRACTITIONER

## 2023-03-23 PROCEDURE — 4010F PR ACE/ARB THEARPY RXD/TAKEN: ICD-10-PCS | Mod: CPTII,S$GLB,, | Performed by: NURSE PRACTITIONER

## 2023-03-23 PROCEDURE — 1160F PR REVIEW ALL MEDS BY PRESCRIBER/CLIN PHARMACIST DOCUMENTED: ICD-10-PCS | Mod: CPTII,S$GLB,, | Performed by: NURSE PRACTITIONER

## 2023-03-23 PROCEDURE — 3079F DIAST BP 80-89 MM HG: CPT | Mod: CPTII,S$GLB,, | Performed by: NURSE PRACTITIONER

## 2023-03-23 PROCEDURE — 3074F SYST BP LT 130 MM HG: CPT | Mod: CPTII,S$GLB,, | Performed by: NURSE PRACTITIONER

## 2023-03-23 PROCEDURE — 1160F RVW MEDS BY RX/DR IN RCRD: CPT | Mod: CPTII,S$GLB,, | Performed by: NURSE PRACTITIONER

## 2023-03-23 PROCEDURE — 3008F PR BODY MASS INDEX (BMI) DOCUMENTED: ICD-10-PCS | Mod: CPTII,S$GLB,, | Performed by: NURSE PRACTITIONER

## 2023-03-23 RX ORDER — DIPHENHYDRAMINE HCL 25 MG
25 CAPSULE ORAL
Status: CANCELLED
Start: 2023-03-23

## 2023-03-23 RX ORDER — HEPARIN 100 UNIT/ML
500 SYRINGE INTRAVENOUS
Status: CANCELLED | OUTPATIENT
Start: 2023-03-30

## 2023-03-23 RX ORDER — DEXAMETHASONE SODIUM PHOSPHATE 4 MG/ML
20 INJECTION, SOLUTION INTRA-ARTICULAR; INTRALESIONAL; INTRAMUSCULAR; INTRAVENOUS; SOFT TISSUE
Status: CANCELLED
Start: 2023-03-30

## 2023-03-23 RX ORDER — FAMOTIDINE 20 MG/1
20 TABLET, FILM COATED ORAL
Status: CANCELLED
Start: 2023-03-23

## 2023-03-23 RX ORDER — FAMOTIDINE 20 MG/1
20 TABLET, FILM COATED ORAL
Status: CANCELLED
Start: 2023-03-30

## 2023-03-23 RX ORDER — SODIUM CHLORIDE 0.9 % (FLUSH) 0.9 %
10 SYRINGE (ML) INJECTION
Status: CANCELLED | OUTPATIENT
Start: 2023-03-30

## 2023-03-23 RX ORDER — DEXAMETHASONE SODIUM PHOSPHATE 4 MG/ML
20 INJECTION, SOLUTION INTRA-ARTICULAR; INTRALESIONAL; INTRAMUSCULAR; INTRAVENOUS; SOFT TISSUE
Status: CANCELLED
Start: 2023-03-23

## 2023-03-23 RX ORDER — DIPHENHYDRAMINE HCL 25 MG
25 CAPSULE ORAL
Status: CANCELLED
Start: 2023-03-30

## 2023-03-23 RX ORDER — HEPARIN 100 UNIT/ML
500 SYRINGE INTRAVENOUS
Status: CANCELLED | OUTPATIENT
Start: 2023-03-23

## 2023-03-23 RX ORDER — SODIUM CHLORIDE 0.9 % (FLUSH) 0.9 %
10 SYRINGE (ML) INJECTION
Status: CANCELLED | OUTPATIENT
Start: 2023-03-23

## 2023-03-23 NOTE — PROGRESS NOTES
MEDICAL ONCOLOGY FOLLOW UP CONSULTATION NOTE    Patient ID: Tequila Hernandez is a 47 y.o. female.    Chief Complaint:  Breast cancer    HPI:  Patient is a 47-year-old female with past medical history of diabetes mellitus, hypertension with recent diagnosis of triple negative breast cancer for which she underwent left breast partial mastectomy and sentinel lymph node biopsy.  Patient reports recovering well from recent surgery and presents to our clinic today for further evaluation.  Voices no acute complaints.  She also has undergone genetic testing with surgery does of her young age and triple negative status.      Pathology:     10/7/22:  1. LEFT AXILLARY SENTINEL LYMPH NODE, EXCISIONAL BIOPSY:   - BENIGN REACTIVE SENTINEL LYMPH NODE EXAMINED, AND IT IS NEGATIVE FOR   METASTATIC CARCINOMA (0/1).   2. LEFT BREAST, LEFT SIMPLE MASTECTOMY:   - RESIDUAL INFILTRATING DUCTAL CARCINOMA, BOO GRADE 3, SIZE 13 MM, ASSOCIATED WITH PRIOR    BIOPSY SCAR/SEED MARKER, MARGINS UNINVOLVED IN THIS SPECIMEN, SEE TUMOR SUMMARY.   - NO EVIDENCE OF DCIS.   - NONPROLIFERATIVE FIBROCYSTIC CHANGES AND PSEUDOANGIOMATOUS   HYPERPLASIA(PASH) PRESENT      COMPRISING A GROSSLY EVIDENT 8 MM NODULE AS WELL INCIDENTAL SMALL   FIBROADENOMA..   3. BREAST, ADDITIONAL SUPERIOR MARGIN, MARGIN EXCISION:   - BENIGN MAMMARY TISSUE AND ADIPOSE TISSUE, NEGATIVE FOR TUMOR, MARGIN   CLEAR.   4. LEFT BREAST, ADDITIONAL MEDIAL MARGIN, MARGIN EXCISION:   - BENIGN MAMMARY TISSUE AND ADIPOSE TISSUE, NEGATIVE FOR TUMOR, MARGIN   CLEAR.   LEFT  BREAST TUMOR SUMMARY(INCORPORATES PARTS 1 TO 4):   SPECIMEN TYPE AND PROCEDURE:      Simple mastectomy with additional margin   excisions and sentinel node biopsy.   SPECIMEN LATERALITY:         Left.   TUMOR SITE (QUADRANT):         Not specified.   SPECIMEN INTEGRITY:         Intact    HISTOLOGIC TYPE:         Infiltrating ductal carcinoma.   COMBINED HISTOLOGIC GRADE:      Martinez Grade 3   BOO HISTOLOGIC  "SCORE:      3,3,,3= total score 9 of 9    SIZE OF INVASIVE COMPONENT:      13 mm   TUMOR FOCALITY:         Unifocal.   TUMOR NECROSIS:         Not identified.   LYMPHOVASCULAR INVASION:      Not identified.   PERINEURAL INVASION:         Not identified   SKIN:               Not applicable (Skin is present and uninvolved.)   NIPPLE:               Not applicable (Nipple is not present.)   SIZE/TYPE/EXTENT INTRADUCTAL CANCER:  Not identified.        EXTENSIVE INTRADUCTAL COMPONENT (EIC): Not applicable (No DCIS).        NECROSIS:            Not applicable.   MICROCALCIFICATION:         Not identified   SURGICAL MARGINS:             INVASIVE:      Negative                IN SITU:      Not applicable.   DISTANCE TO CLOSEST MARGIN:      Invasive carcinoma extends to 3.5 mm of   superior margin.   LYMPH NODES:                 # TOTAL LYMPH NODES EXAMINED:   1  (1 sentinel in part 1)                 # SENTINEL NODES EXAMINED:   1                 # NODES WITH MACROMETASTASIS:   0                 # NODES WITH MICROMETASTASIS:   0    METASTASIS:            Not applicable.   TREATMENT EFFECT:         No known prior therapy.   TNM CODE:            pT1c  pN0(sn)  M-n/a  G3   ANCILLARY STUDIES: BREAST PROGNOSTIC PANEL: Per prior biopsy [id number], the   tumor is ER-, [place]-, HER2 negative ("Triple Negative").   ___________________________________________________________________________    Imaging:     Past Medical History:   Diagnosis Date    Depression     Diabetes mellitus     DM (diabetes mellitus), type 2     Elderly multigravida     Hypertension      Family History   Problem Relation Age of Onset    Hypertension Mother     Diabetes Mother     Hyperlipidemia Mother     Hyperthyroidism Mother     Stroke Maternal Uncle     No Known Problems Father     No Known Problems Sister     No Known Problems Brother     No Known Problems Maternal Grandmother     No Known Problems Maternal Grandfather     No Known Problems Paternal Grandmother  "    No Known Problems Paternal Grandfather      Social History     Socioeconomic History    Marital status:    Tobacco Use    Smoking status: Never    Smokeless tobacco: Never   Substance and Sexual Activity    Alcohol use: Not Currently    Drug use: Never    Sexual activity: Yes     Partners: Male     Birth control/protection: None     Comment: history of IUD     Past Surgical History:   Procedure Laterality Date    LEFT OOPHORECTOMY Left 2012    left ovary removed         Review of systems:  Review of Systems   Constitutional:  Negative for activity change, appetite change, chills, diaphoresis, fatigue and unexpected weight change.   HENT:  Negative for congestion, facial swelling, hearing loss, mouth sores, trouble swallowing and voice change.    Eyes:  Negative for photophobia, pain, discharge and itching.   Respiratory:  Negative for apnea, cough, choking, chest tightness and shortness of breath.    Cardiovascular:  Negative for chest pain, palpitations and leg swelling.   Gastrointestinal:  Positive for nausea. Negative for abdominal distention, abdominal pain, anal bleeding and blood in stool.   Endocrine: Negative for cold intolerance, heat intolerance, polydipsia and polyphagia.   Genitourinary:  Negative for difficulty urinating, dysuria, flank pain and hematuria.   Musculoskeletal:  Negative for arthralgias, back pain, joint swelling, myalgias, neck pain and neck stiffness.        Positive for breast mass. See HPI   Skin:  Negative for color change, pallor and wound.   Allergic/Immunologic: Negative for environmental allergies, food allergies and immunocompromised state.   Neurological:  Negative for dizziness, seizures, facial asymmetry, speech difficulty, light-headedness, numbness and headaches.   Hematological:  Negative for adenopathy. Does not bruise/bleed easily.   Psychiatric/Behavioral:  Negative for agitation, behavioral problems, confusion, decreased concentration and sleep disturbance.         Physical Exam  Vitals and nursing note reviewed.   Constitutional:       General: She is not in acute distress.     Appearance: Normal appearance. She is not ill-appearing.   HENT:      Head: Normocephalic and atraumatic.      Nose: No congestion or rhinorrhea.   Eyes:      General: No scleral icterus.     Extraocular Movements: Extraocular movements intact.      Pupils: Pupils are equal, round, and reactive to light.   Cardiovascular:      Rate and Rhythm: Normal rate and regular rhythm.      Pulses: Normal pulses.      Heart sounds: Normal heart sounds. No murmur heard.    No gallop.   Pulmonary:      Effort: Pulmonary effort is normal. No respiratory distress.      Breath sounds: Normal breath sounds. No stridor. No wheezing or rhonchi.   Abdominal:      General: Bowel sounds are normal. There is no distension.      Palpations: There is no mass.      Tenderness: There is no abdominal tenderness. There is no guarding.   Musculoskeletal:         General: No swelling, tenderness, deformity or signs of injury. Normal range of motion.      Cervical back: Normal range of motion and neck supple. No rigidity. No muscular tenderness.      Right lower leg: No edema.      Left lower leg: No edema.   Skin:     General: Skin is warm.      Coloration: Skin is not jaundiced or pale.      Findings: No bruising or lesion.      Comments: Status post left breast lumpectomy.  Healing well   Neurological:      General: No focal deficit present.      Mental Status: She is alert and oriented to person, place, and time.      Cranial Nerves: No cranial nerve deficit.      Sensory: No sensory deficit.      Motor: No weakness.      Gait: Gait normal.   Psychiatric:         Mood and Affect: Mood normal.         Behavior: Behavior normal.         Thought Content: Thought content normal.                       Vitals:    03/23/23 0910   BP: 123/83   Pulse: 97   Resp: 15   Temp: 97.6 °F (36.4 °C)     Body surface area is 1.75 meters  squared.    Assessment/Plan:      ECOG 1    Infiltrating ductal carcinoma arising from the left breast in female: Stage IB  == Triple negative.  Status post left breast lumpectomy and sentinel lymph node evaluation  ==  pT1c  pN0(sn)  M-n/a  G3.  Size of invasive component is more than 1 cm (13 mm).  Based on her triple negative status and young age my recommendation would be for adjuvant chemotherapy with dose dense AC x4 followed by weekly paclitaxel for 12 cycles.  Post lumpectomy and after completion of chemotherapy patient will be followed by radiation oncology for evaluation of radiation treatment.   == Patient will also need genetic testing and based on documentation from surgery she is agreeable to that.  == 10/19/22:  Patient here today to discuss upcoming chemotherapy, side effect profile, risk versus benefits, and expected outcomes have been discussed today. All questions and concerns answered and consents have been signed. Echo planned for tomorrow, port placement TBD. Plan to start treatment on 11/3/22 as she had lumpectomy 10/4/22.   ==11/3/22:  Status post port placement on Tuesday with Dr. Huerta, review of echo on 10/22/2022 shows EF> 55%, labs reviewed and patient is cleared to begin cycle 1 dose dense AC with Neulasta support as planned for today  ==11/17/22:  Tolerated cycle 1 of ddAC very well with no complaints other than mild nausea x1 day.  Patient was able to take Phenergan without any lingering effects.  Labs reviewed and patient is cleared for for today review of recent genetic testing showing no mutation identified.  ==12/15/22:  Tolerated cycle 3 dose dense AC fairly well with complaints of prolonged nausea which was not relieved with Phenergan and Zofran. Will send out compazine.  Patient is still able to work and is feeling good today.  Labs reviewed and patient is cleared for cycle 4 of 4 AC treatment as planned for today  ==12/29/22: Patient had reaction to Taxol. During first few  minutes of infusion she had flushing, ears felt hot, blood pressure increased to 174/90.  Taxol stopped, benadryl and solumedrol administered, patient was monitored per chemobay nurses. Will hence change her chemotherapy to Abraxane. PA request sent for weekly abraxane 80mg/m2 for 12 cycles  ==01/19/2023:  Pt here today to discuss upcoming chemotherapy, side effect profile, risk versus benefits, and expected outcomes have been discussed today. All questions and concerns answered and consents have been signed. She previously had reaction to Taxol.  Starting Abraxane x 12 weeks.    ==02/09/2023: Abraxane well tolerated, no n/v or neuropathy. She has increase in blood glucose and has appt with her endocrinologist to discuss.  Instructed to monitor daily capillary blood glucose and notify clinic or endocrinologist if continues to run high.  ==02/23/2023: Patient saw [facility] regarding increase in blood glucose. She is on Ozempic and was out due to national shortage which is why her sugar was elevated, now returned to her baseline as she has restarted her Ozempic.  She also had respiratory virus on 2/10/23 which has resolved. No complaints at this time, labs reviewed, patient is cleared for treatment  == 3/23/23:  Doing well, feeling very good.  No neuropathy with Abraxane has been noted.  Patient is continuing to work 4 days a week.  Labs reviewed and patient is cleared for treatment as planned for today    2. DMII  ==Continue to follow up with pcp  ==Continue Metformin per pcp  ==Will monitor for any neuropathy with Taxol, no baseline neuropathy reported    3. CINV  ==Continue Ondansetron, may alternate with phenergan for breakthrough nausea    4. Mucositis  == resolved    Plan:  RTC 2 weeks np visit  To complete weekly abraxane 4/6/23  Established with Dr Boyle, return appt planned in April           Total time spent in counseling and discussion about further management options including relevant lab work,  treatment,  prognosis, medications and intended side effects was more than 60 minutes. More than 50% of the time was spent on counseling and coordination of care.  I spent a total of 60 minutes on the day of the visit.This includes face to face time and non-face to face time preparing to see the patient (eg, review of tests), Obtaining and/or reviewing separately obtained history, Documenting clinical information in the electronic or other health record, Independently interpreting resultsand communicating results to the patient/family/caregiver, or Care coordination.

## 2023-03-29 DIAGNOSIS — C50.919 TRIPLE NEGATIVE BREAST CANCER: ICD-10-CM

## 2023-03-29 LAB
ALBUMIN SERPL BCP-MCNC: 4.1 G/DL (ref 3.4–5)
ALBUMIN/GLOBULIN RATIO: 1.41 RATIO (ref 1.1–1.8)
ALP SERPL-CCNC: 60 U/L (ref 46–116)
ALT SERPL W P-5'-P-CCNC: 21 U/L (ref 12–78)
ANION GAP SERPL CALC-SCNC: 9 MMOL/L (ref 3–11)
AST SERPL-CCNC: 15 U/L (ref 15–37)
B-HCG UR QL: NEGATIVE
BASOPHILS NFR BLD: 0.9 % (ref 0–3)
BILIRUB SERPL-MCNC: 0.6 MG/DL (ref 0–1)
BUN SERPL-MCNC: 9 MG/DL (ref 7–18)
BUN/CREAT SERPL: 14.51 RATIO (ref 7–18)
CALCIUM SERPL-MCNC: 9.4 MG/DL (ref 8.8–10.5)
CHLORIDE SERPL-SCNC: 104 MMOL/L (ref 100–108)
CO2 SERPL-SCNC: 27 MMOL/L (ref 21–32)
CREAT SERPL-MCNC: 0.62 MG/DL (ref 0.55–1.02)
EOSINOPHIL NFR BLD: 3.3 % (ref 1–3)
ERYTHROCYTE [DISTWIDTH] IN BLOOD BY AUTOMATED COUNT: 13.1 % (ref 12.5–18)
GFR ESTIMATION: > 60
GLOBULIN: 2.9 G/DL (ref 2.3–3.5)
GLUCOSE SERPL-MCNC: 172 MG/DL (ref 70–110)
HCT VFR BLD AUTO: 35.1 % (ref 37–47)
HGB BLD-MCNC: 12.7 G/DL (ref 12–16)
LYMPHOCYTES NFR BLD: 21 % (ref 25–40)
MCH RBC QN AUTO: 30.5 PG (ref 27–31.2)
MCHC RBC AUTO-ENTMCNC: 36.2 G/DL (ref 31.8–35.4)
MCV RBC AUTO: 84.4 FL (ref 80–97)
MONOCYTES NFR BLD: 5.7 % (ref 1–15)
NEUTROPHILS # BLD AUTO: 3.94 10*3/UL (ref 1.8–7.7)
NEUTROPHILS NFR BLD: 67.9 % (ref 37–80)
NUCLEATED RED BLOOD CELLS: 0 %
PLATELETS: 342 10*3/UL (ref 142–424)
POTASSIUM SERPL-SCNC: 3.6 MMOL/L (ref 3.6–5.2)
PROT SERPL-MCNC: 7 G/DL (ref 6.4–8.2)
RBC # BLD AUTO: 4.16 10*6/UL (ref 4.2–5.4)
SODIUM BLD-SCNC: 140 MMOL/L (ref 135–145)
WBC # BLD: 5.8 10*3/UL (ref 4.6–10.2)

## 2023-04-03 ENCOUNTER — PATIENT MESSAGE (OUTPATIENT)
Dept: HEMATOLOGY/ONCOLOGY | Facility: CLINIC | Age: 48
End: 2023-04-03
Payer: COMMERCIAL

## 2023-04-12 LAB
ALBUMIN SERPL BCP-MCNC: 4.2 G/DL (ref 3.4–5)
ALBUMIN/GLOBULIN RATIO: 1.4 RATIO (ref 1.1–1.8)
ALP SERPL-CCNC: 65 U/L (ref 46–116)
ALT SERPL W P-5'-P-CCNC: 19 U/L (ref 12–78)
ANION GAP SERPL CALC-SCNC: 8 MMOL/L (ref 3–11)
AST SERPL-CCNC: 12 U/L (ref 15–37)
B-HCG UR QL: NEGATIVE
BILIRUB SERPL-MCNC: 0.8 MG/DL (ref 0–1)
BUN SERPL-MCNC: 11 MG/DL (ref 7–18)
BUN/CREAT SERPL: 17.74 RATIO (ref 7–18)
CALCIUM SERPL-MCNC: 9.2 MG/DL (ref 8.8–10.5)
CHLORIDE SERPL-SCNC: 103 MMOL/L (ref 100–108)
CO2 SERPL-SCNC: 29 MMOL/L (ref 21–32)
CREAT SERPL-MCNC: 0.62 MG/DL (ref 0.55–1.02)
GFR ESTIMATION: > 60
GLOBULIN: 3 G/DL (ref 2.3–3.5)
GLUCOSE SERPL-MCNC: 146 MG/DL (ref 70–110)
POTASSIUM SERPL-SCNC: 4.1 MMOL/L (ref 3.6–5.2)
PROT SERPL-MCNC: 7.2 G/DL (ref 6.4–8.2)
SODIUM BLD-SCNC: 140 MMOL/L (ref 135–145)

## 2023-04-13 ENCOUNTER — OFFICE VISIT (OUTPATIENT)
Dept: HEMATOLOGY/ONCOLOGY | Facility: CLINIC | Age: 48
End: 2023-04-13
Payer: COMMERCIAL

## 2023-04-13 VITALS
DIASTOLIC BLOOD PRESSURE: 89 MMHG | OXYGEN SATURATION: 98 % | BODY MASS INDEX: 29.42 KG/M2 | HEART RATE: 94 BPM | WEIGHT: 155.81 LBS | SYSTOLIC BLOOD PRESSURE: 152 MMHG | HEIGHT: 61 IN

## 2023-04-13 DIAGNOSIS — G62.9 NEUROPATHY: ICD-10-CM

## 2023-04-13 DIAGNOSIS — C50.919 TRIPLE NEGATIVE BREAST CANCER: Primary | ICD-10-CM

## 2023-04-13 PROCEDURE — 3079F DIAST BP 80-89 MM HG: CPT | Mod: CPTII,S$GLB,, | Performed by: NURSE PRACTITIONER

## 2023-04-13 PROCEDURE — 3008F PR BODY MASS INDEX (BMI) DOCUMENTED: ICD-10-PCS | Mod: CPTII,S$GLB,, | Performed by: NURSE PRACTITIONER

## 2023-04-13 PROCEDURE — 1160F RVW MEDS BY RX/DR IN RCRD: CPT | Mod: CPTII,S$GLB,, | Performed by: NURSE PRACTITIONER

## 2023-04-13 PROCEDURE — 99214 PR OFFICE/OUTPT VISIT, EST, LEVL IV, 30-39 MIN: ICD-10-PCS | Mod: S$GLB,,, | Performed by: NURSE PRACTITIONER

## 2023-04-13 PROCEDURE — 1159F PR MEDICATION LIST DOCUMENTED IN MEDICAL RECORD: ICD-10-PCS | Mod: CPTII,S$GLB,, | Performed by: NURSE PRACTITIONER

## 2023-04-13 PROCEDURE — 3079F PR MOST RECENT DIASTOLIC BLOOD PRESSURE 80-89 MM HG: ICD-10-PCS | Mod: CPTII,S$GLB,, | Performed by: NURSE PRACTITIONER

## 2023-04-13 PROCEDURE — 4010F ACE/ARB THERAPY RXD/TAKEN: CPT | Mod: CPTII,S$GLB,, | Performed by: NURSE PRACTITIONER

## 2023-04-13 PROCEDURE — 3077F PR MOST RECENT SYSTOLIC BLOOD PRESSURE >= 140 MM HG: ICD-10-PCS | Mod: CPTII,S$GLB,, | Performed by: NURSE PRACTITIONER

## 2023-04-13 PROCEDURE — 3008F BODY MASS INDEX DOCD: CPT | Mod: CPTII,S$GLB,, | Performed by: NURSE PRACTITIONER

## 2023-04-13 PROCEDURE — 1160F PR REVIEW ALL MEDS BY PRESCRIBER/CLIN PHARMACIST DOCUMENTED: ICD-10-PCS | Mod: CPTII,S$GLB,, | Performed by: NURSE PRACTITIONER

## 2023-04-13 PROCEDURE — 1159F MED LIST DOCD IN RCRD: CPT | Mod: CPTII,S$GLB,, | Performed by: NURSE PRACTITIONER

## 2023-04-13 PROCEDURE — 99214 OFFICE O/P EST MOD 30 MIN: CPT | Mod: S$GLB,,, | Performed by: NURSE PRACTITIONER

## 2023-04-13 PROCEDURE — 4010F PR ACE/ARB THEARPY RXD/TAKEN: ICD-10-PCS | Mod: CPTII,S$GLB,, | Performed by: NURSE PRACTITIONER

## 2023-04-13 PROCEDURE — 3077F SYST BP >= 140 MM HG: CPT | Mod: CPTII,S$GLB,, | Performed by: NURSE PRACTITIONER

## 2023-04-13 RX ORDER — HEPARIN 100 UNIT/ML
500 SYRINGE INTRAVENOUS
OUTPATIENT
Start: 2023-06-13

## 2023-04-13 RX ORDER — FAMOTIDINE 20 MG/1
20 TABLET, FILM COATED ORAL
Status: CANCELLED
Start: 2023-04-13

## 2023-04-13 RX ORDER — HEPARIN 100 UNIT/ML
500 SYRINGE INTRAVENOUS
Status: CANCELLED | OUTPATIENT
Start: 2023-04-13

## 2023-04-13 RX ORDER — SODIUM CHLORIDE 0.9 % (FLUSH) 0.9 %
10 SYRINGE (ML) INJECTION
Status: CANCELLED | OUTPATIENT
Start: 2023-04-13

## 2023-04-13 RX ORDER — DEXAMETHASONE SODIUM PHOSPHATE 4 MG/ML
20 INJECTION, SOLUTION INTRA-ARTICULAR; INTRALESIONAL; INTRAMUSCULAR; INTRAVENOUS; SOFT TISSUE
Status: CANCELLED
Start: 2023-04-13

## 2023-04-13 RX ORDER — DIPHENHYDRAMINE HCL 25 MG
25 CAPSULE ORAL
Status: CANCELLED
Start: 2023-04-13

## 2023-04-13 RX ORDER — SODIUM CHLORIDE 0.9 % (FLUSH) 0.9 %
10 SYRINGE (ML) INJECTION
OUTPATIENT
Start: 2023-06-13

## 2023-04-13 NOTE — PROGRESS NOTES
MEDICAL ONCOLOGY FOLLOW UP CONSULTATION NOTE    Patient ID: Tequila Hernandez is a 47 y.o. female.    Chief Complaint:  Breast cancer    HPI:  Patient is a 47-year-old female with past medical history of diabetes mellitus, hypertension with recent diagnosis of triple negative breast cancer for which she underwent left breast partial mastectomy and sentinel lymph node biopsy.  Patient reports recovering well from recent surgery and presents to our clinic today for further evaluation.  Voices no acute complaints.  She also has undergone genetic testing with surgery does of her young age and triple negative status.      Pathology:     10/7/22:  1. LEFT AXILLARY SENTINEL LYMPH NODE, EXCISIONAL BIOPSY:   - BENIGN REACTIVE SENTINEL LYMPH NODE EXAMINED, AND IT IS NEGATIVE FOR   METASTATIC CARCINOMA (0/1).   2. LEFT BREAST, LEFT SIMPLE MASTECTOMY:   - RESIDUAL INFILTRATING DUCTAL CARCINOMA, BOO GRADE 3, SIZE 13 MM, ASSOCIATED WITH PRIOR    BIOPSY SCAR/SEED MARKER, MARGINS UNINVOLVED IN THIS SPECIMEN, SEE TUMOR SUMMARY.   - NO EVIDENCE OF DCIS.   - NONPROLIFERATIVE FIBROCYSTIC CHANGES AND PSEUDOANGIOMATOUS   HYPERPLASIA(PASH) PRESENT      COMPRISING A GROSSLY EVIDENT 8 MM NODULE AS WELL INCIDENTAL SMALL   FIBROADENOMA..   3. BREAST, ADDITIONAL SUPERIOR MARGIN, MARGIN EXCISION:   - BENIGN MAMMARY TISSUE AND ADIPOSE TISSUE, NEGATIVE FOR TUMOR, MARGIN   CLEAR.   4. LEFT BREAST, ADDITIONAL MEDIAL MARGIN, MARGIN EXCISION:   - BENIGN MAMMARY TISSUE AND ADIPOSE TISSUE, NEGATIVE FOR TUMOR, MARGIN   CLEAR.   LEFT  BREAST TUMOR SUMMARY(INCORPORATES PARTS 1 TO 4):   SPECIMEN TYPE AND PROCEDURE:      Simple mastectomy with additional margin   excisions and sentinel node biopsy.   SPECIMEN LATERALITY:         Left.   TUMOR SITE (QUADRANT):         Not specified.   SPECIMEN INTEGRITY:         Intact    HISTOLOGIC TYPE:         Infiltrating ductal carcinoma.   COMBINED HISTOLOGIC GRADE:      [place] Grade 3   BOO HISTOLOGIC  "SCORE:      3,3,,3= total score 9 of 9    SIZE OF INVASIVE COMPONENT:      13 mm   TUMOR FOCALITY:         Unifocal.   TUMOR NECROSIS:         Not identified.   LYMPHOVASCULAR INVASION:      Not identified.   PERINEURAL INVASION:         Not identified   SKIN:               Not applicable (Skin is present and uninvolved.)   NIPPLE:               Not applicable (Nipple is not present.)   SIZE/TYPE/EXTENT INTRADUCTAL CANCER:  Not identified.        EXTENSIVE INTRADUCTAL COMPONENT (EIC): Not applicable (No DCIS).        NECROSIS:            Not applicable.   MICROCALCIFICATION:         Not identified   SURGICAL MARGINS:             INVASIVE:      Negative                IN SITU:      Not applicable.   DISTANCE TO CLOSEST MARGIN:      Invasive carcinoma extends to 3.5 mm of   superior margin.   LYMPH NODES:                 # TOTAL LYMPH NODES EXAMINED:   1  (1 sentinel in part 1)                 # SENTINEL NODES EXAMINED:   1                 # NODES WITH MACROMETASTASIS:   0                 # NODES WITH MICROMETASTASIS:   0    METASTASIS:            Not applicable.   TREATMENT EFFECT:         No known prior therapy.   TNM CODE:            pT1c  pN0(sn)  M-n/a  G3   ANCILLARY STUDIES: BREAST PROGNOSTIC PANEL: Per prior biopsy [id number], the   tumor is ER-, RI-, HER2 negative ("Triple Negative").   ___________________________________________________________________________    Imaging:     Past Medical History:   Diagnosis Date    Depression     Diabetes mellitus     DM (diabetes mellitus), type 2     Elderly multigravida     Hypertension      Family History   Problem Relation Age of Onset    Hypertension Mother     Diabetes Mother     Hyperlipidemia Mother     Hyperthyroidism Mother     Stroke Maternal Uncle     No Known Problems Father     No Known Problems Sister     No Known Problems Brother     No Known Problems Maternal Grandmother     No Known Problems Maternal Grandfather     No Known Problems Paternal Grandmother  "    No Known Problems Paternal Grandfather      Social History     Socioeconomic History    Marital status:    Tobacco Use    Smoking status: Never    Smokeless tobacco: Never   Substance and Sexual Activity    Alcohol use: Not Currently    Drug use: Never    Sexual activity: Yes     Partners: Male     Birth control/protection: None     Comment: history of IUD     Past Surgical History:   Procedure Laterality Date    LEFT OOPHORECTOMY Left 2012    left ovary removed         Review of systems:  Review of Systems   Constitutional:  Negative for activity change, appetite change, chills, diaphoresis, fatigue and unexpected weight change.   HENT:  Negative for congestion, facial swelling, hearing loss, mouth sores, trouble swallowing and voice change.    Eyes:  Negative for photophobia, pain, discharge and itching.   Respiratory:  Negative for apnea, cough, choking, chest tightness and shortness of breath.    Cardiovascular:  Negative for chest pain, palpitations and leg swelling.   Gastrointestinal:  Positive for nausea. Negative for abdominal distention, abdominal pain, anal bleeding and blood in stool.   Endocrine: Negative for cold intolerance, heat intolerance, polydipsia and polyphagia.   Genitourinary:  Negative for difficulty urinating, dysuria, flank pain and hematuria.   Musculoskeletal:  Negative for arthralgias, back pain, joint swelling, myalgias, neck pain and neck stiffness.        Positive for breast mass. See HPI   Skin:  Negative for color change, pallor and wound.   Allergic/Immunologic: Negative for environmental allergies, food allergies and immunocompromised state.   Neurological:  Negative for dizziness, seizures, facial asymmetry, speech difficulty, light-headedness, numbness and headaches.   Hematological:  Negative for adenopathy. Does not bruise/bleed easily.   Psychiatric/Behavioral:  Negative for agitation, behavioral problems, confusion, decreased concentration and sleep disturbance.         Physical Exam  Vitals and nursing note reviewed.   Constitutional:       General: She is not in acute distress.     Appearance: Normal appearance. She is not ill-appearing.   HENT:      Head: Normocephalic and atraumatic.      Nose: No congestion or rhinorrhea.   Eyes:      General: No scleral icterus.     Extraocular Movements: Extraocular movements intact.      Pupils: Pupils are equal, round, and reactive to light.   Cardiovascular:      Rate and Rhythm: Normal rate and regular rhythm.      Pulses: Normal pulses.      Heart sounds: Normal heart sounds. No murmur heard.    No gallop.   Pulmonary:      Effort: Pulmonary effort is normal. No respiratory distress.      Breath sounds: Normal breath sounds. No stridor. No wheezing or rhonchi.   Abdominal:      General: Bowel sounds are normal. There is no distension.      Palpations: There is no mass.      Tenderness: There is no abdominal tenderness. There is no guarding.   Musculoskeletal:         General: No swelling, tenderness, deformity or signs of injury. Normal range of motion.      Cervical back: Normal range of motion and neck supple. No rigidity. No muscular tenderness.      Right lower leg: No edema.      Left lower leg: No edema.   Skin:     General: Skin is warm.      Coloration: Skin is not jaundiced or pale.      Findings: No bruising or lesion.      Comments: Status post left breast lumpectomy.  Healing well   Neurological:      General: No focal deficit present.      Mental Status: She is alert and oriented to person, place, and time.      Cranial Nerves: No cranial nerve deficit.      Sensory: No sensory deficit.      Motor: No weakness.      Gait: Gait normal.   Psychiatric:         Mood and Affect: Mood normal.         Behavior: Behavior normal.         Thought Content: Thought content normal.                       Vitals:    04/13/23 0808   BP: (!) 152/89   Pulse: 94     Body surface area is 1.74 meters squared.    Assessment/Plan:      ECOG  1    Infiltrating ductal carcinoma arising from the left breast in female: Stage IB  == Triple negative.  Status post left breast lumpectomy and sentinel lymph node evaluation  ==  pT1c  pN0(sn)  M-n/a  G3.  Size of invasive component is more than 1 cm (13 mm).  Based on her triple negative status and young age my recommendation would be for adjuvant chemotherapy with dose dense AC x4 followed by weekly paclitaxel for 12 cycles.  Post lumpectomy and after completion of chemotherapy patient will be followed by radiation oncology for evaluation of radiation treatment.   == Patient will also need genetic testing and based on documentation from surgery she is agreeable to that.  == 10/19/22:  Patient here today to discuss upcoming chemotherapy, side effect profile, risk versus benefits, and expected outcomes have been discussed today. All questions and concerns answered and consents have been signed. Echo planned for tomorrow, port placement TBD. Plan to start treatment on 11/3/22 as she had lumpectomy 10/4/22.   ==11/3/22:  Status post port placement on Tuesday with Dr. Huerta, review of echo on 10/22/2022 shows EF> 55%, labs reviewed and patient is cleared to begin cycle 1 dose dense AC with Neulasta support as planned for today  ==11/17/22:  Tolerated cycle 1 of ddAC very well with no complaints other than mild nausea x1 day.  Patient was able to take Phenergan without any lingering effects.  Labs reviewed and patient is cleared for for today review of recent genetic testing showing no mutation identified.  ==12/15/22:  Tolerated cycle 3 dose dense AC fairly well with complaints of prolonged nausea which was not relieved with Phenergan and Zofran. Will send out compazine.  Patient is still able to work and is feeling good today.  Labs reviewed and patient is cleared for cycle 4 of 4 AC treatment as planned for today  ==12/29/22: Patient had reaction to Taxol. During first few minutes of infusion she had flushing, ears  felt hot, blood pressure increased to 174/90.  Taxol stopped, benadryl and solumedrol administered, patient was monitored per chemobay nurses. Will hence change her chemotherapy to Abraxane. PA request sent for weekly abraxane 80mg/m2 for 12 cycles  ==01/19/2023:  Pt here today to discuss upcoming chemotherapy, side effect profile, risk versus benefits, and expected outcomes have been discussed today. All questions and concerns answered and consents have been signed. She previously had reaction to Taxol.  Starting Abraxane x 12 weeks.    ==02/09/2023: Abraxane well tolerated, no n/v or neuropathy. She has increase in blood glucose and has appt with her endocrinologist to discuss.  Instructed to monitor daily capillary blood glucose and notify clinic or endocrinologist if continues to run high.  ==02/23/2023: Patient saw [facility] regarding increase in blood glucose. She is on Ozempic and was out due to national shortage which is why her sugar was elevated, now returned to her baseline as she has restarted her Ozempic.  She also had respiratory virus on 2/10/23 which has resolved. No complaints at this time, labs reviewed, patient is cleared for treatment  ==4/13/23:  Here today to complete adjuvant chemotherapy, patient is to have upcoming radiation evaluation on April 24th, labs reviewed and cleared for treatment.     2. DMII  ==Continue to follow up with pcp  ==Continue Metformin per pcp  ==Will monitor for any neuropathy with Taxol, no baseline neuropathy reported    3. CINV  ==Continue Ondansetron, may alternate with phenergan for breakthrough nausea    4. Mucositis  == resolved    Plan:  RTC 2 months   Established with Dr Boyle, return appt planned in April 24, 2023  After xrt will start tamoxifen with baby asa   Port flush         Total time spent in counseling and discussion about further management options including relevant lab work, treatment,  prognosis, medications and intended side effects  was more than 60 minutes. More than 50% of the time was spent on counseling and coordination of care.  I spent a total of 60 minutes on the day of the visit.This includes face to face time and non-face to face time preparing to see the patient (eg, review of tests), Obtaining and/or reviewing separately obtained history, Documenting clinical information in the electronic or other health record, Independently interpreting resultsand communicating results to the patient/family/caregiver, or Care coordination.

## 2023-06-13 DIAGNOSIS — C50.919 TRIPLE NEGATIVE BREAST CANCER: Primary | ICD-10-CM

## 2023-06-15 ENCOUNTER — OFFICE VISIT (OUTPATIENT)
Dept: HEMATOLOGY/ONCOLOGY | Facility: CLINIC | Age: 48
End: 2023-06-15
Payer: COMMERCIAL

## 2023-06-15 VITALS
SYSTOLIC BLOOD PRESSURE: 146 MMHG | HEIGHT: 61 IN | DIASTOLIC BLOOD PRESSURE: 90 MMHG | WEIGHT: 157 LBS | RESPIRATION RATE: 16 BRPM | BODY MASS INDEX: 29.64 KG/M2 | HEART RATE: 84 BPM | OXYGEN SATURATION: 97 %

## 2023-06-15 DIAGNOSIS — C50.912 MALIGNANT NEOPLASM OF LEFT BREAST IN FEMALE, ESTROGEN RECEPTOR NEGATIVE, UNSPECIFIED SITE OF BREAST: Primary | ICD-10-CM

## 2023-06-15 DIAGNOSIS — Z17.1 MALIGNANT NEOPLASM OF LEFT BREAST IN FEMALE, ESTROGEN RECEPTOR NEGATIVE, UNSPECIFIED SITE OF BREAST: Primary | ICD-10-CM

## 2023-06-15 PROCEDURE — 1160F RVW MEDS BY RX/DR IN RCRD: CPT | Mod: CPTII,S$GLB,, | Performed by: NURSE PRACTITIONER

## 2023-06-15 PROCEDURE — 1159F PR MEDICATION LIST DOCUMENTED IN MEDICAL RECORD: ICD-10-PCS | Mod: CPTII,S$GLB,, | Performed by: NURSE PRACTITIONER

## 2023-06-15 PROCEDURE — 3077F PR MOST RECENT SYSTOLIC BLOOD PRESSURE >= 140 MM HG: ICD-10-PCS | Mod: CPTII,S$GLB,, | Performed by: NURSE PRACTITIONER

## 2023-06-15 PROCEDURE — 3077F SYST BP >= 140 MM HG: CPT | Mod: CPTII,S$GLB,, | Performed by: NURSE PRACTITIONER

## 2023-06-15 PROCEDURE — 3008F PR BODY MASS INDEX (BMI) DOCUMENTED: ICD-10-PCS | Mod: CPTII,S$GLB,, | Performed by: NURSE PRACTITIONER

## 2023-06-15 PROCEDURE — 1159F MED LIST DOCD IN RCRD: CPT | Mod: CPTII,S$GLB,, | Performed by: NURSE PRACTITIONER

## 2023-06-15 PROCEDURE — 4010F PR ACE/ARB THEARPY RXD/TAKEN: ICD-10-PCS | Mod: CPTII,S$GLB,, | Performed by: NURSE PRACTITIONER

## 2023-06-15 PROCEDURE — 99214 PR OFFICE/OUTPT VISIT, EST, LEVL IV, 30-39 MIN: ICD-10-PCS | Mod: S$GLB,,, | Performed by: NURSE PRACTITIONER

## 2023-06-15 PROCEDURE — 3080F PR MOST RECENT DIASTOLIC BLOOD PRESSURE >= 90 MM HG: ICD-10-PCS | Mod: CPTII,S$GLB,, | Performed by: NURSE PRACTITIONER

## 2023-06-15 PROCEDURE — 3080F DIAST BP >= 90 MM HG: CPT | Mod: CPTII,S$GLB,, | Performed by: NURSE PRACTITIONER

## 2023-06-15 PROCEDURE — 4010F ACE/ARB THERAPY RXD/TAKEN: CPT | Mod: CPTII,S$GLB,, | Performed by: NURSE PRACTITIONER

## 2023-06-15 PROCEDURE — 99214 OFFICE O/P EST MOD 30 MIN: CPT | Mod: S$GLB,,, | Performed by: NURSE PRACTITIONER

## 2023-06-15 PROCEDURE — 1160F PR REVIEW ALL MEDS BY PRESCRIBER/CLIN PHARMACIST DOCUMENTED: ICD-10-PCS | Mod: CPTII,S$GLB,, | Performed by: NURSE PRACTITIONER

## 2023-06-15 PROCEDURE — 3008F BODY MASS INDEX DOCD: CPT | Mod: CPTII,S$GLB,, | Performed by: NURSE PRACTITIONER

## 2023-06-15 NOTE — PROGRESS NOTES
MEDICAL ONCOLOGY FOLLOW UP CONSULTATION NOTE    Patient ID: Tequila Hernandez is a 47 y.o. female.    Chief Complaint:  Breast cancer    HPI:  Patient is a 47-year-old female with past medical history of diabetes mellitus, hypertension with recent diagnosis of triple negative breast cancer for which she underwent left breast partial mastectomy and sentinel lymph node biopsy.  Patient reports recovering well from recent surgery and presents to our clinic today for further evaluation.  Voices no acute complaints.  She also has undergone genetic testing with surgery does of her young age and triple negative status.      Pathology:     10/7/22:  1. LEFT AXILLARY SENTINEL LYMPH NODE, EXCISIONAL BIOPSY:   - BENIGN REACTIVE SENTINEL LYMPH NODE EXAMINED, AND IT IS NEGATIVE FOR   METASTATIC CARCINOMA (0/1).   2. LEFT BREAST, LEFT SIMPLE MASTECTOMY:   - RESIDUAL INFILTRATING DUCTAL CARCINOMA, BOO GRADE 3, SIZE 13 MM, ASSOCIATED WITH PRIOR    BIOPSY SCAR/SEED MARKER, MARGINS UNINVOLVED IN THIS SPECIMEN, SEE TUMOR SUMMARY.   - NO EVIDENCE OF DCIS.   - NONPROLIFERATIVE FIBROCYSTIC CHANGES AND PSEUDOANGIOMATOUS   HYPERPLASIA(PASH) PRESENT      COMPRISING A GROSSLY EVIDENT 8 MM NODULE AS WELL INCIDENTAL SMALL   FIBROADENOMA..   3. BREAST, ADDITIONAL SUPERIOR MARGIN, MARGIN EXCISION:   - BENIGN MAMMARY TISSUE AND ADIPOSE TISSUE, NEGATIVE FOR TUMOR, MARGIN   CLEAR.   4. LEFT BREAST, ADDITIONAL MEDIAL MARGIN, MARGIN EXCISION:   - BENIGN MAMMARY TISSUE AND ADIPOSE TISSUE, NEGATIVE FOR TUMOR, MARGIN   CLEAR.   LEFT  BREAST TUMOR SUMMARY(INCORPORATES PARTS 1 TO 4):   SPECIMEN TYPE AND PROCEDURE:      Simple mastectomy with additional margin   excisions and sentinel node biopsy.   SPECIMEN LATERALITY:         Left.   TUMOR SITE (QUADRANT):         Not specified.   SPECIMEN INTEGRITY:         Intact    HISTOLOGIC TYPE:         Infiltrating ductal carcinoma.   COMBINED HISTOLOGIC GRADE:      [place] Grade 3   BOO HISTOLOGIC  "SCORE:      3,3,,3= total score 9 of 9    SIZE OF INVASIVE COMPONENT:      13 mm   TUMOR FOCALITY:         Unifocal.   TUMOR NECROSIS:         Not identified.   LYMPHOVASCULAR INVASION:      Not identified.   PERINEURAL INVASION:         Not identified   SKIN:               Not applicable (Skin is present and uninvolved.)   NIPPLE:               Not applicable (Nipple is not present.)   SIZE/TYPE/EXTENT INTRADUCTAL CANCER:  Not identified.        EXTENSIVE INTRADUCTAL COMPONENT (EIC): Not applicable (No DCIS).        NECROSIS:            Not applicable.   MICROCALCIFICATION:         Not identified   SURGICAL MARGINS:             INVASIVE:      Negative                IN SITU:      Not applicable.   DISTANCE TO CLOSEST MARGIN:      Invasive carcinoma extends to 3.5 mm of   superior margin.   LYMPH NODES:                 # TOTAL LYMPH NODES EXAMINED:   1  (1 sentinel in part 1)                 # SENTINEL NODES EXAMINED:   1                 # NODES WITH MACROMETASTASIS:   0                 # NODES WITH MICROMETASTASIS:   0    METASTASIS:            Not applicable.   TREATMENT EFFECT:         No known prior therapy.   TNM CODE:            pT1c  pN0(sn)  M-n/a  G3   ANCILLARY STUDIES: BREAST PROGNOSTIC PANEL: Per prior biopsy [id number], the   tumor is ER-, MD-, HER2 negative ("Triple Negative").   ___________________________________________________________________________    Imaging:     Past Medical History:   Diagnosis Date    Depression     Diabetes mellitus     DM (diabetes mellitus), type 2     Elderly multigravida     Hypertension      Family History   Problem Relation Age of Onset    Hypertension Mother     Diabetes Mother     Hyperlipidemia Mother     Hyperthyroidism Mother     Stroke Maternal Uncle     No Known Problems Father     No Known Problems Sister     No Known Problems Brother     No Known Problems Maternal Grandmother     No Known Problems Maternal Grandfather     No Known Problems Paternal Grandmother  "    No Known Problems Paternal Grandfather      Social History     Socioeconomic History    Marital status:    Tobacco Use    Smoking status: Never    Smokeless tobacco: Never   Substance and Sexual Activity    Alcohol use: Not Currently    Drug use: Never    Sexual activity: Yes     Partners: Male     Birth control/protection: None     Comment: history of IUD     Past Surgical History:   Procedure Laterality Date    LEFT OOPHORECTOMY Left 2012    left ovary removed         Review of systems:  Review of Systems   Constitutional:  Negative for activity change, appetite change, chills, diaphoresis, fatigue and unexpected weight change.   HENT:  Negative for congestion, facial swelling, hearing loss, mouth sores, trouble swallowing and voice change.    Eyes:  Negative for photophobia, pain, discharge and itching.   Respiratory:  Negative for apnea, cough, choking, chest tightness and shortness of breath.    Cardiovascular:  Negative for chest pain, palpitations and leg swelling.   Gastrointestinal:  Positive for nausea. Negative for abdominal distention, abdominal pain, anal bleeding and blood in stool.   Endocrine: Negative for cold intolerance, heat intolerance, polydipsia and polyphagia.   Genitourinary:  Negative for difficulty urinating, dysuria, flank pain and hematuria.   Musculoskeletal:  Negative for arthralgias, back pain, joint swelling, myalgias, neck pain and neck stiffness.        Positive for breast mass. See HPI   Skin:  Negative for color change, pallor and wound.   Allergic/Immunologic: Negative for environmental allergies, food allergies and immunocompromised state.   Neurological:  Negative for dizziness, seizures, facial asymmetry, speech difficulty, light-headedness, numbness and headaches.   Hematological:  Negative for adenopathy. Does not bruise/bleed easily.   Psychiatric/Behavioral:  Negative for agitation, behavioral problems, confusion, decreased concentration and sleep disturbance.         Physical Exam  Vitals and nursing note reviewed.   Constitutional:       General: She is not in acute distress.     Appearance: Normal appearance. She is not ill-appearing.   HENT:      Head: Normocephalic and atraumatic.      Nose: No congestion or rhinorrhea.   Eyes:      General: No scleral icterus.     Extraocular Movements: Extraocular movements intact.      Pupils: Pupils are equal, round, and reactive to light.   Cardiovascular:      Rate and Rhythm: Normal rate and regular rhythm.      Pulses: Normal pulses.      Heart sounds: Normal heart sounds. No murmur heard.    No gallop.   Pulmonary:      Effort: Pulmonary effort is normal. No respiratory distress.      Breath sounds: Normal breath sounds. No stridor. No wheezing or rhonchi.   Abdominal:      General: Bowel sounds are normal. There is no distension.      Palpations: There is no mass.      Tenderness: There is no abdominal tenderness. There is no guarding.   Musculoskeletal:         General: No swelling, tenderness, deformity or signs of injury. Normal range of motion.      Cervical back: Normal range of motion and neck supple. No rigidity. No muscular tenderness.      Right lower leg: No edema.      Left lower leg: No edema.   Skin:     General: Skin is warm.      Coloration: Skin is not jaundiced or pale.      Findings: No bruising or lesion.      Comments: Status post left breast lumpectomy.  Healing well   Neurological:      General: No focal deficit present.      Mental Status: She is alert and oriented to person, place, and time.      Cranial Nerves: No cranial nerve deficit.      Sensory: No sensory deficit.      Motor: No weakness.      Gait: Gait normal.   Psychiatric:         Mood and Affect: Mood normal.         Behavior: Behavior normal.         Thought Content: Thought content normal.                       Vitals:    06/15/23 0826   BP: (!) 146/90   Pulse: 84   Resp: 16     Body surface area is 1.75 meters  squared.    Assessment/Plan:      ECOG 1    Infiltrating ductal carcinoma arising from the left breast in female: Stage IB  == Triple negative.  Status post left breast lumpectomy and sentinel lymph node evaluation  ==  pT1c  pN0(sn)  M-n/a  G3.  Size of invasive component is more than 1 cm (13 mm).  Based on her triple negative status and young age my recommendation would be for adjuvant chemotherapy with dose dense AC x4 followed by weekly paclitaxel for 12 cycles.  Post lumpectomy and after completion of chemotherapy patient will be followed by radiation oncology for evaluation of radiation treatment.   == Patient will also need genetic testing and based on documentation from surgery she is agreeable to that.  == 10/19/22:  Patient here today to discuss upcoming chemotherapy, side effect profile, risk versus benefits, and expected outcomes have been discussed today. All questions and concerns answered and consents have been signed. Echo planned for tomorrow, port placement TBD. Plan to start treatment on 11/3/22 as she had lumpectomy 10/4/22.   ==11/3/22:  Status post port placement on Tuesday with Dr. Huerta, review of echo on 10/22/2022 shows EF> 55%, labs reviewed and patient is cleared to begin cycle 1 dose dense AC with Neulasta support as planned for today  ==11/17/22:  Tolerated cycle 1 of ddAC very well with no complaints other than mild nausea x1 day.  Patient was able to take Phenergan without any lingering effects.  Labs reviewed and patient is cleared for for today review of recent genetic testing showing no mutation identified.  ==12/15/22:  Tolerated cycle 3 dose dense AC fairly well with complaints of prolonged nausea which was not relieved with Phenergan and Zofran. Will send out compazine.  Patient is still able to work and is feeling good today.  Labs reviewed and patient is cleared for cycle 4 of 4 AC treatment as planned for today  ==12/29/22: Patient had reaction to Taxol. During first few  minutes of infusion she had flushing, ears felt hot, blood pressure increased to 174/90.  Taxol stopped, benadryl and solumedrol administered, patient was monitored per chemobay nurses. Will hence change her chemotherapy to Abraxane. PA request sent for weekly abraxane 80mg/m2 for 12 cycles  ==01/19/2023:  Pt here today to discuss upcoming chemotherapy, side effect profile, risk versus benefits, and expected outcomes have been discussed today. All questions and concerns answered and consents have been signed. She previously had reaction to Taxol.  Starting Abraxane x 12 weeks.    ==02/09/2023: Abraxane well tolerated, no n/v or neuropathy. She has increase in blood glucose and has appt with her endocrinologist to discuss.  Instructed to monitor daily capillary blood glucose and notify clinic or endocrinologist if continues to run high.  ==02/23/2023: Patient saw [place] urgent [facility] regarding increase in blood glucose. She is on Ozempic and was out due to national shortage which is why her sugar was elevated, now returned to her baseline as she has restarted her Ozempic.  She also had respiratory virus on 2/10/23 which has resolved. No complaints at this time, labs reviewed, patient is cleared for treatment  ==4/13/23:  Here today to complete adjuvant chemotherapy, patient is to have upcoming radiation evaluation on April 24th, labs reviewed and cleared for treatment.   == 6/15/23: completed xrt with Dr Boyle on 5/30/23. Usha well no new complaints feeling good, will order right mammogram due in 8/2023 will delay left mammogram until 6 months after completion of xrt due in Dec 2023 tehn every 6 month for 2 years.             Plan:  Port flush today  Right annual mammogram in august   Will defer left mammogram until 6 months post xrt           Total time spent in counseling and discussion about further management options including relevant lab work, treatment,  prognosis, medications and intended side effects was more  than 60 minutes. More than 50% of the time was spent on counseling and coordination of care.  I spent a total of 60 minutes on the day of the visit.This includes face to face time and non-face to face time preparing to see the patient (eg, review of tests), Obtaining and/or reviewing separately obtained history, Documenting clinical information in the electronic or other health record, Independently interpreting resultsand communicating results to the patient/family/caregiver, or Care coordination.

## 2023-07-06 ENCOUNTER — OFFICE VISIT (OUTPATIENT)
Dept: OBSTETRICS AND GYNECOLOGY | Facility: CLINIC | Age: 48
End: 2023-07-06
Payer: COMMERCIAL

## 2023-07-06 VITALS
HEIGHT: 61 IN | BODY MASS INDEX: 29.83 KG/M2 | SYSTOLIC BLOOD PRESSURE: 145 MMHG | DIASTOLIC BLOOD PRESSURE: 99 MMHG | WEIGHT: 158 LBS | HEART RATE: 89 BPM

## 2023-07-06 DIAGNOSIS — Z12.31 SCREENING MAMMOGRAM, ENCOUNTER FOR: Primary | ICD-10-CM

## 2023-07-06 DIAGNOSIS — Z12.31 SCREENING MAMMOGRAM FOR HIGH-RISK PATIENT: ICD-10-CM

## 2023-07-06 PROCEDURE — 4010F PR ACE/ARB THEARPY RXD/TAKEN: ICD-10-PCS | Mod: CPTII,S$GLB,, | Performed by: OBSTETRICS & GYNECOLOGY

## 2023-07-06 PROCEDURE — 4010F ACE/ARB THERAPY RXD/TAKEN: CPT | Mod: CPTII,S$GLB,, | Performed by: OBSTETRICS & GYNECOLOGY

## 2023-07-06 PROCEDURE — 1159F PR MEDICATION LIST DOCUMENTED IN MEDICAL RECORD: ICD-10-PCS | Mod: CPTII,S$GLB,, | Performed by: OBSTETRICS & GYNECOLOGY

## 2023-07-06 PROCEDURE — 3008F BODY MASS INDEX DOCD: CPT | Mod: CPTII,S$GLB,, | Performed by: OBSTETRICS & GYNECOLOGY

## 2023-07-06 PROCEDURE — 3080F PR MOST RECENT DIASTOLIC BLOOD PRESSURE >= 90 MM HG: ICD-10-PCS | Mod: CPTII,S$GLB,, | Performed by: OBSTETRICS & GYNECOLOGY

## 2023-07-06 PROCEDURE — 3077F SYST BP >= 140 MM HG: CPT | Mod: CPTII,S$GLB,, | Performed by: OBSTETRICS & GYNECOLOGY

## 2023-07-06 PROCEDURE — 1159F MED LIST DOCD IN RCRD: CPT | Mod: CPTII,S$GLB,, | Performed by: OBSTETRICS & GYNECOLOGY

## 2023-07-06 PROCEDURE — 3077F PR MOST RECENT SYSTOLIC BLOOD PRESSURE >= 140 MM HG: ICD-10-PCS | Mod: CPTII,S$GLB,, | Performed by: OBSTETRICS & GYNECOLOGY

## 2023-07-06 PROCEDURE — 3080F DIAST BP >= 90 MM HG: CPT | Mod: CPTII,S$GLB,, | Performed by: OBSTETRICS & GYNECOLOGY

## 2023-07-06 PROCEDURE — 99396 PREV VISIT EST AGE 40-64: CPT | Mod: S$GLB,,, | Performed by: OBSTETRICS & GYNECOLOGY

## 2023-07-06 PROCEDURE — 99396 PR PREVENTIVE VISIT,EST,40-64: ICD-10-PCS | Mod: S$GLB,,, | Performed by: OBSTETRICS & GYNECOLOGY

## 2023-07-06 PROCEDURE — 3008F PR BODY MASS INDEX (BMI) DOCUMENTED: ICD-10-PCS | Mod: CPTII,S$GLB,, | Performed by: OBSTETRICS & GYNECOLOGY

## 2023-07-06 RX ORDER — SEMAGLUTIDE 0.68 MG/ML
INJECTION, SOLUTION SUBCUTANEOUS
COMMUNITY
Start: 2023-05-26

## 2023-07-06 NOTE — PROGRESS NOTES
Subjective:      Patient ID: Tequila Hernandez is a 47 y.o. female.    Chief Complaint:  Well Woman      History of Present Illness  HPI  Patient with h/o breast cancer.  Patient will have port removal after 1 year with Dr. Huerta    GYN & OB History  No LMP recorded. Patient has had an implant.   Date of Last Pap: No result found    OB History    Para Term  AB Living   2 2       2   SAB IAB Ectopic Multiple Live Births                  # Outcome Date GA Lbr Travis/2nd Weight Sex Delivery Anes PTL Lv   2 Para            1 Para                Review of Systems  Review of Systems   Constitutional:  Negative for activity change, appetite change, fatigue, fever and unexpected weight change.   HENT:  Negative for nasal congestion and tinnitus.    Eyes:  Negative for visual disturbance.   Respiratory:  Negative for cough and shortness of breath.    Cardiovascular:  Negative for chest pain and leg swelling.   Gastrointestinal:  Negative for abdominal pain, bloating, blood in stool, constipation and diarrhea.   Genitourinary:  Negative for bladder incontinence, dysuria, vaginal bleeding, vaginal discharge, vaginal pain, vaginal dryness and vaginal odor.   Musculoskeletal:  Negative for arthralgias, back pain and joint swelling.   Integumentary:  Negative for acne.   Neurological:  Negative for headaches.   Psychiatric/Behavioral:  Negative for depression and sleep disturbance. The patient is not nervous/anxious.         Objective:     Physical Exam:   Constitutional: She is oriented to person, place, and time. Vital signs are normal. She appears well-developed and well-nourished. She is cooperative.      Neck: No thyroid mass and no thyromegaly present.    Cardiovascular:  Normal rate, regular rhythm and normal pulses.             Pulmonary/Chest: Effort normal. No respiratory distress. Chest wall is not dull to percussion. She exhibits no mass, no bony tenderness, no laceration, no crepitus, no edema, no deformity,  no swelling and no retraction. Right breast exhibits no inverted nipple, no mass, no nipple discharge, no skin change, no tenderness, presence, no bleeding, no swelling and no lumpectomy. Left breast exhibits lumpectomy. Left breast exhibits no inverted nipple, no mass, no nipple discharge, no skin change, no tenderness, presence, no bleeding and no swelling.   History of lumpectomy with scar on leftside.            Abdominal: Soft. She exhibits no distension. There is no abdominal tenderness. No hernia.     Genitourinary:    Vagina, uterus and rectum normal.      Pelvic exam was performed with patient supine.   Labial bartholins normal.There is no rash, tenderness, lesion or injury on the right labia. There is no rash, tenderness, lesion or injury on the left labia. Cervix is normal. Right adnexum displays no mass, no tenderness and no fullness. Left adnexum displays no mass, no tenderness and no fullness. No  no vaginal discharge, rectocele, cystocele or unspecified prolapse of vaginal walls in the vagina.           Musculoskeletal: Moves all extremeties.       Neurological: She is alert and oriented to person, place, and time.    Skin: Skin is warm and dry. No rash noted.    Psychiatric: She has a normal mood and affect. Her speech is normal and behavior is normal. Judgment and thought content normal.       Assessment:     1. Screening mammogram, encounter for       Well Woman Exam        Plan:     Routine care

## 2023-07-07 ENCOUNTER — TELEPHONE (OUTPATIENT)
Dept: HEMATOLOGY/ONCOLOGY | Facility: CLINIC | Age: 48
End: 2023-07-07
Payer: COMMERCIAL

## 2023-07-07 NOTE — TELEPHONE ENCOUNTER
----- Message from Breanne Leon sent at 7/6/2023  2:38 PM CDT -----  Type:  Needs Medical Advice    Who Called: Tequila Hernandez  Symptoms (please be specific): -   How long has patient had these symptoms:  -  Pharmacy name and phone #:  -  Would the patient rather a call back or a response via [facility]?    Best Call Back Number: [phone]    Additional Information:  pt says mammo dept does not have her orders please fax to them

## 2023-07-11 LAB — Lab: NORMAL

## 2023-08-02 ENCOUNTER — PATIENT MESSAGE (OUTPATIENT)
Dept: HEMATOLOGY/ONCOLOGY | Facility: CLINIC | Age: 48
End: 2023-08-02
Payer: COMMERCIAL

## 2023-08-18 ENCOUNTER — TELEPHONE (OUTPATIENT)
Dept: HEMATOLOGY/ONCOLOGY | Facility: CLINIC | Age: 48
End: 2023-08-18
Payer: COMMERCIAL

## 2023-08-18 DIAGNOSIS — T45.1X5A CINV (CHEMOTHERAPY-INDUCED NAUSEA AND VOMITING): Primary | ICD-10-CM

## 2023-08-18 DIAGNOSIS — R11.2 CINV (CHEMOTHERAPY-INDUCED NAUSEA AND VOMITING): Primary | ICD-10-CM

## 2023-08-18 NOTE — TELEPHONE ENCOUNTER
Port flush completed on 8/9 at [facility], next port flush shcheduled for 10/4

## 2023-08-21 LAB
ALBUMIN SERPL BCP-MCNC: 4.2 G/DL (ref 3.4–5)
ALBUMIN/GLOBULIN RATIO: 1.4 RATIO (ref 1.1–1.8)
ALP SERPL-CCNC: 66 U/L (ref 46–116)
ALT SERPL W P-5'-P-CCNC: 20 U/L (ref 12–78)
ANION GAP SERPL CALC-SCNC: 11 MMOL/L (ref 3–11)
AST SERPL-CCNC: 17 U/L (ref 15–37)
BASOPHILS NFR BLD: 0.4 % (ref 0–3)
BILIRUB SERPL-MCNC: 0.6 MG/DL (ref 0–1)
BUN SERPL-MCNC: 9 MG/DL (ref 7–18)
BUN/CREAT SERPL: 15.78 RATIO (ref 7–18)
CALCIUM SERPL-MCNC: 9.1 MG/DL (ref 8.8–10.5)
CHLORIDE SERPL-SCNC: 104 MMOL/L (ref 100–108)
CO2 SERPL-SCNC: 27 MMOL/L (ref 21–32)
CREAT SERPL-MCNC: 0.57 MG/DL (ref 0.55–1.02)
EOSINOPHIL NFR BLD: 3.9 % (ref 1–3)
ERYTHROCYTE [DISTWIDTH] IN BLOOD BY AUTOMATED COUNT: 13.2 % (ref 12.5–18)
GFR ESTIMATION: > 60
GLOBULIN: 3 G/DL (ref 2.3–3.5)
GLUCOSE SERPL-MCNC: 103 MG/DL (ref 70–110)
HCT VFR BLD AUTO: 36.4 % (ref 37–47)
HGB BLD-MCNC: 12.7 G/DL (ref 12–16)
LYMPHOCYTES NFR BLD: 26.8 % (ref 25–40)
MCH RBC QN AUTO: 29.1 PG (ref 27–31.2)
MCHC RBC AUTO-ENTMCNC: 34.9 G/DL (ref 31.8–35.4)
MCV RBC AUTO: 83.3 FL (ref 80–97)
MONOCYTES NFR BLD: 8.4 % (ref 1–15)
NEUTROPHILS # BLD AUTO: 3.09 10*3/UL (ref 1.8–7.7)
NEUTROPHILS NFR BLD: 60.3 % (ref 37–80)
NUCLEATED RED BLOOD CELLS: 0 %
PLATELETS: 285 10*3/UL (ref 142–424)
POTASSIUM SERPL-SCNC: 3.7 MMOL/L (ref 3.6–5.2)
PROT SERPL-MCNC: 7.2 G/DL (ref 6.4–8.2)
RBC # BLD AUTO: 4.37 10*6/UL (ref 4.2–5.4)
SODIUM BLD-SCNC: 142 MMOL/L (ref 135–145)
WBC # BLD: 5.1 10*3/UL (ref 4.6–10.2)

## 2023-08-22 ENCOUNTER — OFFICE VISIT (OUTPATIENT)
Dept: HEMATOLOGY/ONCOLOGY | Facility: CLINIC | Age: 48
End: 2023-08-22
Payer: COMMERCIAL

## 2023-08-22 VITALS
SYSTOLIC BLOOD PRESSURE: 145 MMHG | BODY MASS INDEX: 30.62 KG/M2 | HEART RATE: 84 BPM | OXYGEN SATURATION: 98 % | RESPIRATION RATE: 18 BRPM | WEIGHT: 162.19 LBS | DIASTOLIC BLOOD PRESSURE: 85 MMHG | HEIGHT: 61 IN

## 2023-08-22 DIAGNOSIS — C50.912 MALIGNANT NEOPLASM OF LEFT BREAST IN FEMALE, ESTROGEN RECEPTOR NEGATIVE, UNSPECIFIED SITE OF BREAST: Primary | ICD-10-CM

## 2023-08-22 DIAGNOSIS — Z17.1 MALIGNANT NEOPLASM OF LEFT BREAST IN FEMALE, ESTROGEN RECEPTOR NEGATIVE, UNSPECIFIED SITE OF BREAST: Primary | ICD-10-CM

## 2023-08-22 PROCEDURE — 3079F DIAST BP 80-89 MM HG: CPT | Mod: CPTII,S$GLB,, | Performed by: NURSE PRACTITIONER

## 2023-08-22 PROCEDURE — 3077F PR MOST RECENT SYSTOLIC BLOOD PRESSURE >= 140 MM HG: ICD-10-PCS | Mod: CPTII,S$GLB,, | Performed by: NURSE PRACTITIONER

## 2023-08-22 PROCEDURE — 1160F PR REVIEW ALL MEDS BY PRESCRIBER/CLIN PHARMACIST DOCUMENTED: ICD-10-PCS | Mod: CPTII,S$GLB,, | Performed by: NURSE PRACTITIONER

## 2023-08-22 PROCEDURE — 1159F MED LIST DOCD IN RCRD: CPT | Mod: CPTII,S$GLB,, | Performed by: NURSE PRACTITIONER

## 2023-08-22 PROCEDURE — 4010F PR ACE/ARB THEARPY RXD/TAKEN: ICD-10-PCS | Mod: CPTII,S$GLB,, | Performed by: NURSE PRACTITIONER

## 2023-08-22 PROCEDURE — 99214 OFFICE O/P EST MOD 30 MIN: CPT | Mod: S$GLB,,, | Performed by: NURSE PRACTITIONER

## 2023-08-22 PROCEDURE — 1160F RVW MEDS BY RX/DR IN RCRD: CPT | Mod: CPTII,S$GLB,, | Performed by: NURSE PRACTITIONER

## 2023-08-22 PROCEDURE — 1159F PR MEDICATION LIST DOCUMENTED IN MEDICAL RECORD: ICD-10-PCS | Mod: CPTII,S$GLB,, | Performed by: NURSE PRACTITIONER

## 2023-08-22 PROCEDURE — 3008F BODY MASS INDEX DOCD: CPT | Mod: CPTII,S$GLB,, | Performed by: NURSE PRACTITIONER

## 2023-08-22 PROCEDURE — 4010F ACE/ARB THERAPY RXD/TAKEN: CPT | Mod: CPTII,S$GLB,, | Performed by: NURSE PRACTITIONER

## 2023-08-22 PROCEDURE — 99214 PR OFFICE/OUTPT VISIT, EST, LEVL IV, 30-39 MIN: ICD-10-PCS | Mod: S$GLB,,, | Performed by: NURSE PRACTITIONER

## 2023-08-22 PROCEDURE — 3079F PR MOST RECENT DIASTOLIC BLOOD PRESSURE 80-89 MM HG: ICD-10-PCS | Mod: CPTII,S$GLB,, | Performed by: NURSE PRACTITIONER

## 2023-08-22 PROCEDURE — 3008F PR BODY MASS INDEX (BMI) DOCUMENTED: ICD-10-PCS | Mod: CPTII,S$GLB,, | Performed by: NURSE PRACTITIONER

## 2023-08-22 PROCEDURE — 3077F SYST BP >= 140 MM HG: CPT | Mod: CPTII,S$GLB,, | Performed by: NURSE PRACTITIONER

## 2023-08-22 RX ORDER — HEPARIN 100 UNIT/ML
500 SYRINGE INTRAVENOUS
OUTPATIENT
Start: 2023-08-22

## 2023-08-22 RX ORDER — SODIUM CHLORIDE 0.9 % (FLUSH) 0.9 %
10 SYRINGE (ML) INJECTION
OUTPATIENT
Start: 2023-08-22

## 2023-08-22 NOTE — PROGRESS NOTES
MEDICAL ONCOLOGY FOLLOW UP CONSULTATION NOTE    Patient ID: Tequila Hernandez is a 47 y.o. female.    Chief Complaint:  Breast cancer    HPI:  Patient is a 47-year-old female with past medical history of diabetes mellitus, hypertension with recent diagnosis of triple negative breast cancer for which she underwent left breast partial mastectomy and sentinel lymph node biopsy.  Patient reports recovering well from recent surgery and presents to our clinic today for further evaluation.  Voices no acute complaints.  She also has undergone genetic testing with surgery does of her young age and triple negative status.      Pathology:     10/7/22:  1. LEFT AXILLARY SENTINEL LYMPH NODE, EXCISIONAL BIOPSY:   - BENIGN REACTIVE SENTINEL LYMPH NODE EXAMINED, AND IT IS NEGATIVE FOR   METASTATIC CARCINOMA (0/1).   2. LEFT BREAST, LEFT SIMPLE MASTECTOMY:   - RESIDUAL INFILTRATING DUCTAL CARCINOMA, BOO GRADE 3, SIZE 13 MM, ASSOCIATED WITH PRIOR    BIOPSY SCAR/SEED MARKER, MARGINS UNINVOLVED IN THIS SPECIMEN, SEE TUMOR SUMMARY.   - NO EVIDENCE OF DCIS.   - NONPROLIFERATIVE FIBROCYSTIC CHANGES AND PSEUDOANGIOMATOUS   HYPERPLASIA(PASH) PRESENT      COMPRISING A GROSSLY EVIDENT 8 MM NODULE AS WELL INCIDENTAL SMALL   FIBROADENOMA..   3. BREAST, ADDITIONAL SUPERIOR MARGIN, MARGIN EXCISION:   - BENIGN MAMMARY TISSUE AND ADIPOSE TISSUE, NEGATIVE FOR TUMOR, MARGIN   CLEAR.   4. LEFT BREAST, ADDITIONAL MEDIAL MARGIN, MARGIN EXCISION:   - BENIGN MAMMARY TISSUE AND ADIPOSE TISSUE, NEGATIVE FOR TUMOR, MARGIN   CLEAR.   LEFT  BREAST TUMOR SUMMARY(INCORPORATES PARTS 1 TO 4):   SPECIMEN TYPE AND PROCEDURE:      Simple mastectomy with additional margin   excisions and sentinel node biopsy.   SPECIMEN LATERALITY:         Left.   TUMOR SITE (QUADRANT):         Not specified.   SPECIMEN INTEGRITY:         Intact    HISTOLOGIC TYPE:         Infiltrating ductal carcinoma.   COMBINED HISTOLOGIC GRADE:      Amery Grade 3   BOO HISTOLOGIC  "SCORE:      3,3,,3= total score 9 of 9    SIZE OF INVASIVE COMPONENT:      13 mm   TUMOR FOCALITY:         Unifocal.   TUMOR NECROSIS:         Not identified.   LYMPHOVASCULAR INVASION:      Not identified.   PERINEURAL INVASION:         Not identified   SKIN:               Not applicable (Skin is present and uninvolved.)   NIPPLE:               Not applicable (Nipple is not present.)   SIZE/TYPE/EXTENT INTRADUCTAL CANCER:  Not identified.        EXTENSIVE INTRADUCTAL COMPONENT (EIC): Not applicable (No DCIS).        NECROSIS:            Not applicable.   MICROCALCIFICATION:         Not identified   SURGICAL MARGINS:             INVASIVE:      Negative                IN SITU:      Not applicable.   DISTANCE TO CLOSEST MARGIN:      Invasive carcinoma extends to 3.5 mm of   superior margin.   LYMPH NODES:                 # TOTAL LYMPH NODES EXAMINED:   1  (1 sentinel in part 1)                 # SENTINEL NODES EXAMINED:   1                 # NODES WITH MACROMETASTASIS:   0                 # NODES WITH MICROMETASTASIS:   0    METASTASIS:            Not applicable.   TREATMENT EFFECT:         No known prior therapy.   TNM CODE:            pT1c  pN0(sn)  M-n/a  G3   ANCILLARY STUDIES: BREAST PROGNOSTIC PANEL: Per prior biopsy [id number], the   tumor is ER-, VT-, HER2 negative ("Triple Negative").   ___________________________________________________________________________    Imaging:     Past Medical History:   Diagnosis Date    Depression     Diabetes mellitus     DM (diabetes mellitus), type 2     Elderly multigravida     Hypertension      Family History   Problem Relation Age of Onset    Hypertension Mother     Diabetes Mother     Hyperlipidemia Mother     Hyperthyroidism Mother     Stroke Maternal Uncle     No Known Problems Father     No Known Problems Sister     No Known Problems Brother     No Known Problems Maternal Grandmother     No Known Problems Maternal Grandfather     No Known Problems Paternal Grandmother  "    No Known Problems Paternal Grandfather      Social History     Socioeconomic History    Marital status:    Tobacco Use    Smoking status: Never    Smokeless tobacco: Never   Substance and Sexual Activity    Alcohol use: Not Currently    Drug use: Never    Sexual activity: Yes     Partners: Male     Birth control/protection: None     Comment: history of IUD     Past Surgical History:   Procedure Laterality Date    LEFT OOPHORECTOMY Left 2012    left ovary removed         Review of systems:  Review of Systems   Constitutional:  Negative for activity change, appetite change, chills, diaphoresis, fatigue and unexpected weight change.   HENT:  Negative for congestion, facial swelling, hearing loss, mouth sores, trouble swallowing and voice change.    Eyes:  Negative for photophobia, pain, discharge and itching.   Respiratory:  Negative for apnea, cough, choking, chest tightness and shortness of breath.    Cardiovascular:  Negative for chest pain, palpitations and leg swelling.   Gastrointestinal:  Positive for nausea. Negative for abdominal distention, abdominal pain, anal bleeding and blood in stool.   Endocrine: Negative for cold intolerance, heat intolerance, polydipsia and polyphagia.   Genitourinary:  Negative for difficulty urinating, dysuria, flank pain and hematuria.   Musculoskeletal:  Negative for arthralgias, back pain, joint swelling, myalgias, neck pain and neck stiffness.        Positive for breast mass. See HPI   Skin:  Negative for color change, pallor and wound.   Allergic/Immunologic: Negative for environmental allergies, food allergies and immunocompromised state.   Neurological:  Negative for dizziness, seizures, facial asymmetry, speech difficulty, light-headedness, numbness and headaches.   Hematological:  Negative for adenopathy. Does not bruise/bleed easily.   Psychiatric/Behavioral:  Negative for agitation, behavioral problems, confusion, decreased concentration and sleep disturbance.           Physical Exam  Vitals and nursing note reviewed.   Constitutional:       General: She is not in acute distress.     Appearance: Normal appearance. She is not ill-appearing.   HENT:      Head: Normocephalic and atraumatic.      Nose: No congestion or rhinorrhea.   Eyes:      General: No scleral icterus.     Extraocular Movements: Extraocular movements intact.      Pupils: Pupils are equal, round, and reactive to light.   Cardiovascular:      Rate and Rhythm: Normal rate and regular rhythm.      Pulses: Normal pulses.      Heart sounds: Normal heart sounds. No murmur heard.     No gallop.   Pulmonary:      Effort: Pulmonary effort is normal. No respiratory distress.      Breath sounds: Normal breath sounds. No stridor. No wheezing or rhonchi.   Abdominal:      General: Bowel sounds are normal. There is no distension.      Palpations: There is no mass.      Tenderness: There is no abdominal tenderness. There is no guarding.   Musculoskeletal:         General: No swelling, tenderness, deformity or signs of injury. Normal range of motion.      Cervical back: Normal range of motion and neck supple. No rigidity. No muscular tenderness.      Right lower leg: No edema.      Left lower leg: No edema.   Skin:     General: Skin is warm.      Coloration: Skin is not jaundiced or pale.      Findings: No bruising or lesion.      Comments: Status post left breast lumpectomy.  Healing well   Neurological:      General: No focal deficit present.      Mental Status: She is alert and oriented to person, place, and time.      Cranial Nerves: No cranial nerve deficit.      Sensory: No sensory deficit.      Motor: No weakness.      Gait: Gait normal.   Psychiatric:         Mood and Affect: Mood normal.         Behavior: Behavior normal.         Thought Content: Thought content normal.                         Vitals:    08/22/23 1013   BP: (!) 145/85   Pulse: 84   Resp: 18     Body surface area is 1.78 meters  squared.    Assessment/Plan:      ECOG 1    Infiltrating ductal carcinoma arising from the left breast in female: Stage IB  == Triple negative.  Status post left breast lumpectomy and sentinel lymph node evaluation  ==  pT1c  pN0(sn)  M-n/a  G3.  Size of invasive component is more than 1 cm (13 mm).  Based on her triple negative status and young age my recommendation would be for adjuvant chemotherapy with dose dense AC x4 followed by weekly paclitaxel for 12 cycles.  Post lumpectomy and after completion of chemotherapy patient will be followed by radiation oncology for evaluation of radiation treatment.   == Patient will also need genetic testing and based on documentation from surgery she is agreeable to that.  == 10/19/22:  Patient here today to discuss upcoming chemotherapy, side effect profile, risk versus benefits, and expected outcomes have been discussed today. All questions and concerns answered and consents have been signed. Echo planned for tomorrow, port placement TBD. Plan to start treatment on 11/3/22 as she had lumpectomy 10/4/22.   ==11/3/22:  Status post port placement on Tuesday with Dr. Huerta, review of echo on 10/22/2022 shows EF> 55%, labs reviewed and patient is cleared to begin cycle 1 dose dense AC with Neulasta support as planned for today  ==11/17/22:  Tolerated cycle 1 of ddAC very well with no complaints other than mild nausea x1 day.  Patient was able to take Phenergan without any lingering effects.  Labs reviewed and patient is cleared for for today review of recent genetic testing showing no mutation identified.  ==12/15/22:  Tolerated cycle 3 dose dense AC fairly well with complaints of prolonged nausea which was not relieved with Phenergan and Zofran. Will send out compazine.  Patient is still able to work and is feeling good today.  Labs reviewed and patient is cleared for cycle 4 of 4 AC treatment as planned for today  ==12/29/22: Patient had reaction to Taxol. During first few  minutes of infusion she had flushing, ears felt hot, blood pressure increased to 174/90.  Taxol stopped, benadryl and solumedrol administered, patient was monitored per chemobay nurses. Will hence change her chemotherapy to Abraxane. PA request sent for weekly abraxane 80mg/m2 for 12 cycles  ==01/19/2023:  Pt here today to discuss upcoming chemotherapy, side effect profile, risk versus benefits, and expected outcomes have been discussed today. All questions and concerns answered and consents have been signed. She previously had reaction to Taxol.  Starting Abraxane x 12 weeks.    ==02/09/2023: Abraxane well tolerated, no n/v or neuropathy. She has increase in blood glucose and has appt with her endocrinologist to discuss.  Instructed to monitor daily capillary blood glucose and notify clinic or endocrinologist if continues to run high.  ==02/23/2023: Patient saw [facility] regarding increase in blood glucose. She is on Ozempic and was out due to national shortage which is why her sugar was elevated, now returned to her baseline as she has restarted her Ozempic.  She also had respiratory virus on 2/10/23 which has resolved. No complaints at this time, labs reviewed, patient is cleared for treatment  ==4/13/23:  Here today to complete adjuvant chemotherapy, patient is to have upcoming radiation evaluation on April 24th, labs reviewed and cleared for treatment.   == 6/15/23: completed xrt with Dr Boyle on 5/30/23. Usha well no new complaints feeling good, will order right mammogram due in 8/2023 will delay left mammogram until 6 months after completion of xrt due in Dec 2023 tehn every 6 month for 2 years.   == 8/22/23: doing well, with no new c/o. Recent right mammo Cat 1. Will proceed with ct chest r/o met disease      Plan:  RTC 2 months with ct chest with [facility]   Right annual mammogram in august   Will defer left mammogram until 6 months post xrt due in November 2023          Total time spent in counseling  and discussion about further management options including relevant lab work, treatment,  prognosis, medications and intended side effects was more than 60 minutes. More than 50% of the time was spent on counseling and coordination of care.  I spent a total of 60 minutes on the day of the visit.This includes face to face time and non-face to face time preparing to see the patient (eg, review of tests), Obtaining and/or reviewing separately obtained history, Documenting clinical information in the electronic or other health record, Independently interpreting resultsand communicating results to the patient/family/caregiver, or Care coordination.

## 2023-10-17 ENCOUNTER — TELEPHONE (OUTPATIENT)
Dept: HEMATOLOGY/ONCOLOGY | Facility: CLINIC | Age: 48
End: 2023-10-17

## 2023-10-17 ENCOUNTER — OFFICE VISIT (OUTPATIENT)
Dept: HEMATOLOGY/ONCOLOGY | Facility: CLINIC | Age: 48
End: 2023-10-17
Payer: COMMERCIAL

## 2023-10-17 VITALS
BODY MASS INDEX: 29.51 KG/M2 | DIASTOLIC BLOOD PRESSURE: 88 MMHG | OXYGEN SATURATION: 98 % | WEIGHT: 156.31 LBS | RESPIRATION RATE: 16 BRPM | SYSTOLIC BLOOD PRESSURE: 145 MMHG | HEART RATE: 91 BPM | HEIGHT: 61 IN

## 2023-10-17 DIAGNOSIS — C50.912 MALIGNANT NEOPLASM OF LEFT BREAST IN FEMALE, ESTROGEN RECEPTOR NEGATIVE, UNSPECIFIED SITE OF BREAST: Primary | ICD-10-CM

## 2023-10-17 DIAGNOSIS — Z17.1 MALIGNANT NEOPLASM OF LEFT BREAST IN FEMALE, ESTROGEN RECEPTOR NEGATIVE, UNSPECIFIED SITE OF BREAST: Primary | ICD-10-CM

## 2023-10-17 PROCEDURE — 1160F PR REVIEW ALL MEDS BY PRESCRIBER/CLIN PHARMACIST DOCUMENTED: ICD-10-PCS | Mod: CPTII,S$GLB,, | Performed by: NURSE PRACTITIONER

## 2023-10-17 PROCEDURE — 4010F PR ACE/ARB THEARPY RXD/TAKEN: ICD-10-PCS | Mod: CPTII,S$GLB,, | Performed by: NURSE PRACTITIONER

## 2023-10-17 PROCEDURE — 99215 PR OFFICE/OUTPT VISIT, EST, LEVL V, 40-54 MIN: ICD-10-PCS | Mod: S$GLB,,, | Performed by: NURSE PRACTITIONER

## 2023-10-17 PROCEDURE — 1160F RVW MEDS BY RX/DR IN RCRD: CPT | Mod: CPTII,S$GLB,, | Performed by: NURSE PRACTITIONER

## 2023-10-17 PROCEDURE — 99215 OFFICE O/P EST HI 40 MIN: CPT | Mod: S$GLB,,, | Performed by: NURSE PRACTITIONER

## 2023-10-17 PROCEDURE — 1159F PR MEDICATION LIST DOCUMENTED IN MEDICAL RECORD: ICD-10-PCS | Mod: CPTII,S$GLB,, | Performed by: NURSE PRACTITIONER

## 2023-10-17 PROCEDURE — 3079F DIAST BP 80-89 MM HG: CPT | Mod: CPTII,S$GLB,, | Performed by: NURSE PRACTITIONER

## 2023-10-17 PROCEDURE — 3008F PR BODY MASS INDEX (BMI) DOCUMENTED: ICD-10-PCS | Mod: CPTII,S$GLB,, | Performed by: NURSE PRACTITIONER

## 2023-10-17 PROCEDURE — 1159F MED LIST DOCD IN RCRD: CPT | Mod: CPTII,S$GLB,, | Performed by: NURSE PRACTITIONER

## 2023-10-17 PROCEDURE — 3079F PR MOST RECENT DIASTOLIC BLOOD PRESSURE 80-89 MM HG: ICD-10-PCS | Mod: CPTII,S$GLB,, | Performed by: NURSE PRACTITIONER

## 2023-10-17 PROCEDURE — 3077F SYST BP >= 140 MM HG: CPT | Mod: CPTII,S$GLB,, | Performed by: NURSE PRACTITIONER

## 2023-10-17 PROCEDURE — 3077F PR MOST RECENT SYSTOLIC BLOOD PRESSURE >= 140 MM HG: ICD-10-PCS | Mod: CPTII,S$GLB,, | Performed by: NURSE PRACTITIONER

## 2023-10-17 PROCEDURE — 4010F ACE/ARB THERAPY RXD/TAKEN: CPT | Mod: CPTII,S$GLB,, | Performed by: NURSE PRACTITIONER

## 2023-10-17 PROCEDURE — 3008F BODY MASS INDEX DOCD: CPT | Mod: CPTII,S$GLB,, | Performed by: NURSE PRACTITIONER

## 2023-10-17 NOTE — PROGRESS NOTES
MEDICAL ONCOLOGY FOLLOW UP CONSULTATION NOTE    Patient ID: Tequila Hernandez is a 47 y.o. female.    Chief Complaint:  Breast cancer    HPI:  Patient is a 47-year-old female with past medical history of diabetes mellitus, hypertension with recent diagnosis of triple negative breast cancer for which she underwent left breast partial mastectomy and sentinel lymph node biopsy.  Patient reports recovering well from recent surgery and presents to our clinic today for further evaluation.  Voices no acute complaints.  She also has undergone genetic testing with surgery does of her young age and triple negative status.      Pathology:     10/7/22:  1. LEFT AXILLARY SENTINEL LYMPH NODE, EXCISIONAL BIOPSY:   - BENIGN REACTIVE SENTINEL LYMPH NODE EXAMINED, AND IT IS NEGATIVE FOR   METASTATIC CARCINOMA (0/1).   2. LEFT BREAST, LEFT SIMPLE MASTECTOMY:   - RESIDUAL INFILTRATING DUCTAL CARCINOMA, BOO GRADE 3, SIZE 13 MM, ASSOCIATED WITH PRIOR    BIOPSY SCAR/SEED MARKER, MARGINS UNINVOLVED IN THIS SPECIMEN, SEE TUMOR SUMMARY.   - NO EVIDENCE OF DCIS.   - NONPROLIFERATIVE FIBROCYSTIC CHANGES AND PSEUDOANGIOMATOUS   HYPERPLASIA(PASH) PRESENT      COMPRISING A GROSSLY EVIDENT 8 MM NODULE AS WELL INCIDENTAL SMALL   FIBROADENOMA..   3. BREAST, ADDITIONAL SUPERIOR MARGIN, MARGIN EXCISION:   - BENIGN MAMMARY TISSUE AND ADIPOSE TISSUE, NEGATIVE FOR TUMOR, MARGIN   CLEAR.   4. LEFT BREAST, ADDITIONAL MEDIAL MARGIN, MARGIN EXCISION:   - BENIGN MAMMARY TISSUE AND ADIPOSE TISSUE, NEGATIVE FOR TUMOR, MARGIN   CLEAR.   LEFT  BREAST TUMOR SUMMARY(INCORPORATES PARTS 1 TO 4):   SPECIMEN TYPE AND PROCEDURE:      Simple mastectomy with additional margin   excisions and sentinel node biopsy.   SPECIMEN LATERALITY:         Left.   TUMOR SITE (QUADRANT):         Not specified.   SPECIMEN INTEGRITY:         Intact    HISTOLOGIC TYPE:         Infiltrating ductal carcinoma.   COMBINED HISTOLOGIC GRADE:      [place] Grade 3   BOO HISTOLOGIC  "SCORE:      3,3,,3= total score 9 of 9    SIZE OF INVASIVE COMPONENT:      13 mm   TUMOR FOCALITY:         Unifocal.   TUMOR NECROSIS:         Not identified.   LYMPHOVASCULAR INVASION:      Not identified.   PERINEURAL INVASION:         Not identified   SKIN:               Not applicable (Skin is present and uninvolved.)   NIPPLE:               Not applicable (Nipple is not present.)   SIZE/TYPE/EXTENT INTRADUCTAL CANCER:  Not identified.        EXTENSIVE INTRADUCTAL COMPONENT (EIC): Not applicable (No DCIS).        NECROSIS:            Not applicable.   MICROCALCIFICATION:         Not identified   SURGICAL MARGINS:             INVASIVE:      Negative                IN SITU:      Not applicable.   DISTANCE TO CLOSEST MARGIN:      Invasive carcinoma extends to 3.5 mm of   superior margin.   LYMPH NODES:                 # TOTAL LYMPH NODES EXAMINED:   1  (1 sentinel in part 1)                 # SENTINEL NODES EXAMINED:   1                 # NODES WITH MACROMETASTASIS:   0                 # NODES WITH MICROMETASTASIS:   0    METASTASIS:            Not applicable.   TREATMENT EFFECT:         No known prior therapy.   TNM CODE:            pT1c  pN0(sn)  M-n/a  G3   ANCILLARY STUDIES: BREAST PROGNOSTIC PANEL: Per prior biopsy [id number], the   tumor is ER-, VT-, HER2 negative ("Triple Negative").   ___________________________________________________________________________    Imaging:   CT Chest With Contrast                                RADIOLOGY REPORT        PT NAME: SARAH JALLOH      [facility]     : 1975 F 47             [address]    ACCT: [id number]                                              [facility] Rec #: [id number]                                        [id number]    [facility]             Procedure: CHEST THORAX  W CONT    REQUISITION #: [id number]      REPORT #: [id number]           DATE OF EXAM: 23    TIME OF EXAM: 1115       CHEST " THORAX  W CONT        CLINICAL INDICATION: MALIGNANT NEOPLASM OF UNSPECIFIED SITE OF LEFT FEMALE   BREAST: MALIGNANT NEOPLASM OF UNSPECIFIED SITE OF LEFT FEMALE BREAST        COMPARISON: None.        TECHNIQUE: A CT scan of the chest was performed after IV contrast   administration. Automated exposure control was used for dose reduction.   Radiation dose: 11 msv.        FINDINGS:    Heart size is normal without effusion. The aorta and proximal branches are   without acute finding. The thyroid is unremarkable. There is a right chest   port in place. The axilla are free of adenopathy. There is no mediastinal or   hilar adenopathy. There are postsurgical changes of the left breast. Images    of the upper abdomen are unremarkable.        There is no consolidation, effusion, or suspicious pulmonary finding. There    is bibasilar scarring. There is no aggressive osseous lesion.            IMPRESSION:     Postoperative change about the left breast without suspicious finding.        Signer Name: Joe Quinteros MD    Signed: 9/5/2023 1:00 PM CDT    ()        DICTATING PHYSICIAN:  JOE QUINTEROS MD                   Date Dictated: 09/05/23 1256        Signed By:  JOE QUINTEROS MD     Date Signed:  09/05/23 1303     CC: SHELDON OROURKE NP ; SHELDON OROURKE NP      ADMITTING PHYSICIAN:                                                                                                    ORDERING PHY: SHELDON OROURKE NP                                                                                                                                                      ATTENDING PHY: SHELDON OROURKE NP    Patient Status:  REG CLI    Admit Service Date: 09/05/23            Past Medical History:   Diagnosis Date    Depression     Diabetes mellitus     DM (diabetes mellitus), type 2     Elderly multigravida     Hypertension      Family History   Problem Relation Age of Onset    Hypertension Mother     Diabetes Mother     Hyperlipidemia Mother      Hyperthyroidism Mother     Stroke Maternal Uncle     No Known Problems Father     No Known Problems Sister     No Known Problems Brother     No Known Problems Maternal Grandmother     No Known Problems Maternal Grandfather     No Known Problems Paternal Grandmother     No Known Problems Paternal Grandfather      Social History     Socioeconomic History    Marital status:    Tobacco Use    Smoking status: Never    Smokeless tobacco: Never   Substance and Sexual Activity    Alcohol use: Not Currently    Drug use: Never    Sexual activity: Yes     Partners: Male     Birth control/protection: None     Comment: history of IUD     Past Surgical History:   Procedure Laterality Date    LEFT OOPHORECTOMY Left 2012    left ovary removed         Review of systems:  Review of Systems   Constitutional:  Negative for activity change, appetite change, chills, diaphoresis, fatigue and unexpected weight change.   HENT:  Negative for congestion, facial swelling, hearing loss, mouth sores, trouble swallowing and voice change.    Eyes:  Negative for photophobia, pain, discharge and itching.   Respiratory:  Negative for apnea, cough, choking, chest tightness and shortness of breath.    Cardiovascular:  Negative for chest pain, palpitations and leg swelling.   Gastrointestinal:  Positive for nausea. Negative for abdominal distention, abdominal pain, anal bleeding and blood in stool.   Endocrine: Negative for cold intolerance, heat intolerance, polydipsia and polyphagia.   Genitourinary:  Negative for difficulty urinating, dysuria, flank pain and hematuria.   Musculoskeletal:  Negative for arthralgias, back pain, joint swelling, myalgias, neck pain and neck stiffness.        Positive for breast mass. See HPI   Skin:  Negative for color change, pallor and wound.   Allergic/Immunologic: Negative for environmental allergies, food allergies and immunocompromised state.   Neurological:  Negative for dizziness, seizures, facial  asymmetry, speech difficulty, light-headedness, numbness and headaches.   Hematological:  Negative for adenopathy. Does not bruise/bleed easily.   Psychiatric/Behavioral:  Negative for agitation, behavioral problems, confusion, decreased concentration and sleep disturbance.          Physical Exam  Vitals and nursing note reviewed.   Constitutional:       General: She is not in acute distress.     Appearance: Normal appearance. She is not ill-appearing.   HENT:      Head: Normocephalic and atraumatic.      Nose: No congestion or rhinorrhea.   Eyes:      General: No scleral icterus.     Extraocular Movements: Extraocular movements intact.      Pupils: Pupils are equal, round, and reactive to light.   Cardiovascular:      Rate and Rhythm: Normal rate and regular rhythm.      Pulses: Normal pulses.      Heart sounds: Normal heart sounds. No murmur heard.     No gallop.   Pulmonary:      Effort: Pulmonary effort is normal. No respiratory distress.      Breath sounds: Normal breath sounds. No stridor. No wheezing or rhonchi.   Abdominal:      General: Bowel sounds are normal. There is no distension.      Palpations: There is no mass.      Tenderness: There is no abdominal tenderness. There is no guarding.   Musculoskeletal:         General: No swelling, tenderness, deformity or signs of injury. Normal range of motion.      Cervical back: Normal range of motion and neck supple. No rigidity. No muscular tenderness.      Right lower leg: No edema.      Left lower leg: No edema.   Skin:     General: Skin is warm.      Coloration: Skin is not jaundiced or pale.      Findings: No bruising or lesion.      Comments: Status post left breast lumpectomy.  Healing well   Neurological:      General: No focal deficit present.      Mental Status: She is alert and oriented to person, place, and time.      Cranial Nerves: No cranial nerve deficit.      Sensory: No sensory deficit.      Motor: No weakness.      Gait: Gait normal.    Psychiatric:         Mood and Affect: Mood normal.         Behavior: Behavior normal.         Thought Content: Thought content normal.                         Vitals:    10/17/23 0956   BP: (!) 145/88   Pulse: 91   Resp: 16     Body surface area is 1.75 meters squared.    Assessment/Plan:      ECOG 1    Infiltrating ductal carcinoma arising from the left breast in female: Stage IB  == Triple negative.  Status post left breast lumpectomy and sentinel lymph node evaluation  ==  pT1c  pN0(sn)  M-n/a  G3.  Size of invasive component is more than 1 cm (13 mm).  Based on her triple negative status and young age my recommendation would be for adjuvant chemotherapy with dose dense AC x4 followed by weekly paclitaxel for 12 cycles.  Post lumpectomy and after completion of chemotherapy patient will be followed by radiation oncology for evaluation of radiation treatment.   == Patient will also need genetic testing and based on documentation from surgery she is agreeable to that.  == 10/19/22:  Patient here today to discuss upcoming chemotherapy, side effect profile, risk versus benefits, and expected outcomes have been discussed today. All questions and concerns answered and consents have been signed. Echo planned for tomorrow, port placement TBD. Plan to start treatment on 11/3/22 as she had lumpectomy 10/4/22.   ==11/3/22:  Status post port placement on Tuesday with Dr. Huerta, review of echo on 10/22/2022 shows EF> 55%, labs reviewed and patient is cleared to begin cycle 1 dose dense AC with Neulasta support as planned for today  ==11/17/22:  Tolerated cycle 1 of ddAC very well with no complaints other than mild nausea x1 day.  Patient was able to take Phenergan without any lingering effects.  Labs reviewed and patient is cleared for for today review of recent genetic testing showing no mutation identified.  ==12/15/22:  Tolerated cycle 3 dose dense AC fairly well with complaints of prolonged nausea which was not relieved  with Phenergan and Zofran. Will send out compazine.  Patient is still able to work and is feeling good today.  Labs reviewed and patient is cleared for cycle 4 of 4 AC treatment as planned for today  ==12/29/22: Patient had reaction to Taxol. During first few minutes of infusion she had flushing, ears felt hot, blood pressure increased to 174/90.  Taxol stopped, benadryl and solumedrol administered, patient was monitored per chemobay nurses. Will hence change her chemotherapy to Abraxane. PA request sent for weekly abraxane 80mg/m2 for 12 cycles  ==01/19/2023:  Pt here today to discuss upcoming chemotherapy, side effect profile, risk versus benefits, and expected outcomes have been discussed today. All questions and concerns answered and consents have been signed. She previously had reaction to Taxol.  Starting Abraxane x 12 weeks.    ==02/09/2023: Abraxane well tolerated, no n/v or neuropathy. She has increase in blood glucose and has appt with her endocrinologist to discuss.  Instructed to monitor daily capillary blood glucose and notify clinic or endocrinologist if continues to run high.  ==02/23/2023: Patient saw [facility] regarding increase in blood glucose. She is on Ozempic and was out due to national shortage which is why her sugar was elevated, now returned to her baseline as she has restarted her Ozempic.  She also had respiratory virus on 2/10/23 which has resolved. No complaints at this time, labs reviewed, patient is cleared for treatment  ==4/13/23:  Here today to complete adjuvant chemotherapy, patient is to have upcoming radiation evaluation on April 24th, labs reviewed and cleared for treatment.   == 6/15/23: completed xrt with Dr Boyle on 5/30/23. Usha well no new complaints feeling good, will order right mammogram due in 8/2023 will delay left mammogram until 6 months after completion of xrt due in Dec 2023 tehn every 6 month for 2 years.   == 8/22/23: doing well, with no new c/o. Recent  right mammo Cat 1. Will proceed with ct chest r/o met disease      Plan:  RTC 2 months with cbc cmp   Right annual mammogram in august   Will defer left mammogram until 6 months post xrt due in November 2023, orders placed today           Total time spent in counseling and discussion about further management options including relevant lab work, treatment,  prognosis, medications and intended side effects was more than 60 minutes. More than 50% of the time was spent on counseling and coordination of care.  I spent a total of 60 minutes on the day of the visit.This includes face to face time and non-face to face time preparing to see the patient (eg, review of tests), Obtaining and/or reviewing separately obtained history, Documenting clinical information in the electronic or other health record, Independently interpreting resultsand communicating results to the patient/family/caregiver, or Care coordination.

## 2023-11-28 LAB
ALBUMIN SERPL BCP-MCNC: 4 G/DL (ref 3.4–5)
ALBUMIN/GLOBULIN RATIO: 1.11 RATIO (ref 1.1–1.8)
ALP SERPL-CCNC: 71 U/L (ref 46–116)
ALT SERPL W P-5'-P-CCNC: 20 U/L (ref 12–78)
ANION GAP SERPL CALC-SCNC: 11 MMOL/L (ref 3–11)
AST SERPL-CCNC: 14 U/L (ref 15–37)
BASOPHILS NFR BLD: 0.4 % (ref 0–3)
BILIRUB SERPL-MCNC: 0.5 MG/DL (ref 0–1)
BUN SERPL-MCNC: 14 MG/DL (ref 7–18)
BUN/CREAT SERPL: 21.87 RATIO (ref 7–18)
CALCIUM SERPL-MCNC: 8.9 MG/DL (ref 8.8–10.5)
CHLORIDE SERPL-SCNC: 102 MMOL/L (ref 100–108)
CO2 SERPL-SCNC: 27 MMOL/L (ref 21–32)
CREAT SERPL-MCNC: 0.64 MG/DL (ref 0.55–1.02)
EOSINOPHIL NFR BLD: 3.4 % (ref 1–3)
ERYTHROCYTE [DISTWIDTH] IN BLOOD BY AUTOMATED COUNT: 12.5 % (ref 12.5–18)
GFR ESTIMATION: > 60
GLOBULIN: 3.6 G/DL (ref 2.3–3.5)
GLUCOSE SERPL-MCNC: 134 MG/DL (ref 70–110)
HCT VFR BLD AUTO: 38 % (ref 37–47)
HGB BLD-MCNC: 12.6 G/DL (ref 12–16)
LYMPHOCYTES NFR BLD: 25.5 % (ref 25–40)
MCH RBC QN AUTO: 28.4 PG (ref 27–31.2)
MCHC RBC AUTO-ENTMCNC: 33.2 G/DL (ref 31.8–35.4)
MCV RBC AUTO: 85.6 FL (ref 80–97)
MONOCYTES NFR BLD: 6.2 % (ref 1–15)
NEUTROPHILS # BLD AUTO: 3.19 10*3/UL (ref 1.8–7.7)
NEUTROPHILS NFR BLD: 64.1 % (ref 37–80)
NUCLEATED RED BLOOD CELLS: 0 %
PLATELETS: 327 10*3/UL (ref 142–424)
POTASSIUM SERPL-SCNC: 3.4 MMOL/L (ref 3.6–5.2)
PROT SERPL-MCNC: 7.6 G/DL (ref 6.4–8.2)
RBC # BLD AUTO: 4.44 10*6/UL (ref 4.2–5.4)
SODIUM BLD-SCNC: 140 MMOL/L (ref 135–145)
WBC # BLD: 5 10*3/UL (ref 4.6–10.2)

## 2023-11-29 ENCOUNTER — OFFICE VISIT (OUTPATIENT)
Dept: HEMATOLOGY/ONCOLOGY | Facility: CLINIC | Age: 48
End: 2023-11-29
Payer: COMMERCIAL

## 2023-11-29 VITALS
DIASTOLIC BLOOD PRESSURE: 77 MMHG | BODY MASS INDEX: 28.89 KG/M2 | RESPIRATION RATE: 17 BRPM | SYSTOLIC BLOOD PRESSURE: 126 MMHG | OXYGEN SATURATION: 98 % | WEIGHT: 153 LBS | HEIGHT: 61 IN | HEART RATE: 81 BPM

## 2023-11-29 DIAGNOSIS — C50.919 TRIPLE NEGATIVE BREAST CANCER: Primary | ICD-10-CM

## 2023-11-29 PROCEDURE — 3078F PR MOST RECENT DIASTOLIC BLOOD PRESSURE < 80 MM HG: ICD-10-PCS | Mod: CPTII,S$GLB,, | Performed by: INTERNAL MEDICINE

## 2023-11-29 PROCEDURE — 3008F BODY MASS INDEX DOCD: CPT | Mod: CPTII,S$GLB,, | Performed by: INTERNAL MEDICINE

## 2023-11-29 PROCEDURE — 1159F PR MEDICATION LIST DOCUMENTED IN MEDICAL RECORD: ICD-10-PCS | Mod: CPTII,S$GLB,, | Performed by: INTERNAL MEDICINE

## 2023-11-29 PROCEDURE — 4010F PR ACE/ARB THEARPY RXD/TAKEN: ICD-10-PCS | Mod: CPTII,S$GLB,, | Performed by: INTERNAL MEDICINE

## 2023-11-29 PROCEDURE — 4010F ACE/ARB THERAPY RXD/TAKEN: CPT | Mod: CPTII,S$GLB,, | Performed by: INTERNAL MEDICINE

## 2023-11-29 PROCEDURE — 3074F SYST BP LT 130 MM HG: CPT | Mod: CPTII,S$GLB,, | Performed by: INTERNAL MEDICINE

## 2023-11-29 PROCEDURE — 3078F DIAST BP <80 MM HG: CPT | Mod: CPTII,S$GLB,, | Performed by: INTERNAL MEDICINE

## 2023-11-29 PROCEDURE — 3074F PR MOST RECENT SYSTOLIC BLOOD PRESSURE < 130 MM HG: ICD-10-PCS | Mod: CPTII,S$GLB,, | Performed by: INTERNAL MEDICINE

## 2023-11-29 PROCEDURE — 3008F PR BODY MASS INDEX (BMI) DOCUMENTED: ICD-10-PCS | Mod: CPTII,S$GLB,, | Performed by: INTERNAL MEDICINE

## 2023-11-29 PROCEDURE — 99215 PR OFFICE/OUTPT VISIT, EST, LEVL V, 40-54 MIN: ICD-10-PCS | Mod: S$GLB,,, | Performed by: INTERNAL MEDICINE

## 2023-11-29 PROCEDURE — 99215 OFFICE O/P EST HI 40 MIN: CPT | Mod: S$GLB,,, | Performed by: INTERNAL MEDICINE

## 2023-11-29 PROCEDURE — 1159F MED LIST DOCD IN RCRD: CPT | Mod: CPTII,S$GLB,, | Performed by: INTERNAL MEDICINE

## 2023-11-29 RX ORDER — VALSARTAN 160 MG/1
160 TABLET ORAL DAILY
COMMUNITY

## 2023-11-29 NOTE — PROGRESS NOTES
MEDICAL ONCOLOGY FOLLOW UP CONSULTATION NOTE    Patient ID: Tequila Hernandez is a 47 y.o. female.    Chief Complaint:  Breast cancer    HPI:  Patient is a 47-year-old female with past medical history of diabetes mellitus, hypertension with recent diagnosis of triple negative breast cancer for which she underwent left breast partial mastectomy and sentinel lymph node biopsy.  Patient reports recovering well from recent surgery and presents to our clinic today for further evaluation.  Voices no acute complaints.  She also has undergone genetic testing with surgery does of her young age and triple negative status.      Pathology:     10/7/22:  1. LEFT AXILLARY SENTINEL LYMPH NODE, EXCISIONAL BIOPSY:   - BENIGN REACTIVE SENTINEL LYMPH NODE EXAMINED, AND IT IS NEGATIVE FOR   METASTATIC CARCINOMA (0/1).   2. LEFT BREAST, LEFT SIMPLE MASTECTOMY:   - RESIDUAL INFILTRATING DUCTAL CARCINOMA, BOO GRADE 3, SIZE 13 MM, ASSOCIATED WITH PRIOR    BIOPSY SCAR/SEED MARKER, MARGINS UNINVOLVED IN THIS SPECIMEN, SEE TUMOR SUMMARY.   - NO EVIDENCE OF DCIS.   - NONPROLIFERATIVE FIBROCYSTIC CHANGES AND PSEUDOANGIOMATOUS   HYPERPLASIA(PASH) PRESENT      COMPRISING A GROSSLY EVIDENT 8 MM NODULE AS WELL INCIDENTAL SMALL   FIBROADENOMA..   3. BREAST, ADDITIONAL SUPERIOR MARGIN, MARGIN EXCISION:   - BENIGN MAMMARY TISSUE AND ADIPOSE TISSUE, NEGATIVE FOR TUMOR, MARGIN   CLEAR.   4. LEFT BREAST, ADDITIONAL MEDIAL MARGIN, MARGIN EXCISION:   - BENIGN MAMMARY TISSUE AND ADIPOSE TISSUE, NEGATIVE FOR TUMOR, MARGIN   CLEAR.   LEFT  BREAST TUMOR SUMMARY(INCORPORATES PARTS 1 TO 4):   SPECIMEN TYPE AND PROCEDURE:      Simple mastectomy with additional margin   excisions and sentinel node biopsy.   SPECIMEN LATERALITY:         Left.   TUMOR SITE (QUADRANT):         Not specified.   SPECIMEN INTEGRITY:         Intact    HISTOLOGIC TYPE:         Infiltrating ductal carcinoma.   COMBINED HISTOLOGIC GRADE:      Converse Grade 3   BOO HISTOLOGIC  "SCORE:      3,3,,3= total score 9 of 9    SIZE OF INVASIVE COMPONENT:      13 mm   TUMOR FOCALITY:         Unifocal.   TUMOR NECROSIS:         Not identified.   LYMPHOVASCULAR INVASION:      Not identified.   PERINEURAL INVASION:         Not identified   SKIN:               Not applicable (Skin is present and uninvolved.)   NIPPLE:               Not applicable (Nipple is not present.)   SIZE/TYPE/EXTENT INTRADUCTAL CANCER:  Not identified.        EXTENSIVE INTRADUCTAL COMPONENT (EIC): Not applicable (No DCIS).        NECROSIS:            Not applicable.   MICROCALCIFICATION:         Not identified   SURGICAL MARGINS:             INVASIVE:      Negative                IN SITU:      Not applicable.   DISTANCE TO CLOSEST MARGIN:      Invasive carcinoma extends to 3.5 mm of   superior margin.   LYMPH NODES:                 # TOTAL LYMPH NODES EXAMINED:   1  (1 sentinel in part 1)                 # SENTINEL NODES EXAMINED:   1                 # NODES WITH MACROMETASTASIS:   0                 # NODES WITH MICROMETASTASIS:   0    METASTASIS:            Not applicable.   TREATMENT EFFECT:         No known prior therapy.   TNM CODE:            pT1c  pN0(sn)  M-n/a  G3   ANCILLARY STUDIES: BREAST PROGNOSTIC PANEL: Per prior biopsy [id number], the   tumor is ER-, [place]-, HER2 negative ("Triple Negative").   ___________________________________________________________________________    Imaging:   [facility] UNKNOWN RAD EAP                                RADIOLOGY REPORT        PT NAME: SARAH JALLOH      [facility]     : 1975 F 47             [address]    ACCT: [id number]                                              [facility] Rec #: [id number]                                        [id number]    Patient Location: [email or web address]/             Procedure: DREW DX UNI W CAD JOHAN    REQUISITION #: [id number]      REPORT #: [id number]           DATE OF EXAM: 23    TIME OF EXAM: 1000       DREW DX UNI W " CAD JOHAN, 11/14/2023 11:13 AM CST        HISTORY: MALIGNANT NEOPLASM OF UNSPECIFIED SITE OF LEFT FEMALE BREAST        COMPARISON: Mammograms 10/3/2022, 8/24/2022, 8/17/2022, 8/16/2021, and   8/12/2020        TECHNIQUE: Routine CC and MLO and true lateral 3D views of the left breast   including digital tomosynthesis images were performed. [employer] computer-aided   detection software was utilized in the interpretation of this examination.             FINDINGS: The breast is extremely dense, which lowers the sensitivity of   mammography. No suspicious mass, calcification, or architectural distortion    to suggest malignancy. The patient has had an interval left-sided lumpectomy   and a prior left-sided core biopsy. Benign calcifications are noted.            IMPRESSION:        No mammographic evidence of malignancy. Given the patient's personal history   of breast cancer at a premenopausal age (as well as dense breast tissue) a  nnual supplemental screening MRI is recommended. The patient will be due for   bilateral annual mammography in August 2024.        BI-RADS: BI-RADS 2          0 - Incomplete. Needs additional imaging evaluation.      1 - Negative mammogram.      2 - Benign findings.      3 - Probably benign finding -follow-up recommended.      4 - Suspicious abnormality - biopsy is recommended.      5 - Highly suggestive of malignancy -biopsy is recommended.      6 - Biopsy-proven malignancy- continue clinical management recommended.        Result of this examination and recommendations will be communicated directly   to the patient and to the referring clinician via the nurse navigator. This    facility uses a reminder system to ensure that all patients receive a   reminder letters and/or direct phone calls for appointments. This includes   reminders for routine screening mammograms, diagnostic mammograms, and other   breast interventions when appropriate. The patient will be placed in the   appropriate reminder  system.                    DICTATING PHYSICIAN:  JOSH SONG MD                   Date Dictated: 11/14/23 1131        Signed By:  JOSH SONG MD <Electronically signed by JOSH SONG MD in OV>    Date Signed:  11/14/23 1137     CC: SHELDON OROURKE NP ; SHELDON OROURKE NP      ADMITTING PHYSICIAN:                                                                                                    ORDERING PHY: SHELDON OROURKE NP                                                                                                                                                      ATTENDING PHY: SHELDON OROURKE NP    Patient Status:  REG CLI    Admit Service Date: 11/14/23            Past Medical History:   Diagnosis Date    Depression     Diabetes mellitus     DM (diabetes mellitus), type 2     Elderly multigravida     Hypertension      Family History   Problem Relation Age of Onset    Hypertension Mother     Diabetes Mother     Hyperlipidemia Mother     Hyperthyroidism Mother     Stroke Maternal Uncle     No Known Problems Father     No Known Problems Sister     No Known Problems Brother     No Known Problems Maternal Grandmother     No Known Problems Maternal Grandfather     No Known Problems Paternal Grandmother     No Known Problems Paternal Grandfather      Social History     Socioeconomic History    Marital status:    Tobacco Use    Smoking status: Never    Smokeless tobacco: Never   Substance and Sexual Activity    Alcohol use: Not Currently    Drug use: Never    Sexual activity: Yes     Partners: Male     Birth control/protection: None     Comment: history of IUD     Past Surgical History:   Procedure Laterality Date    LEFT OOPHORECTOMY Left 2012    left ovary removed         Review of systems:  Review of Systems   Constitutional:  Negative for activity change, appetite change, chills, diaphoresis, fatigue and unexpected weight change.   HENT:  Negative for congestion, facial swelling, hearing  loss, mouth sores, trouble swallowing and voice change.    Eyes:  Negative for photophobia, pain, discharge and itching.   Respiratory:  Negative for apnea, cough, choking, chest tightness and shortness of breath.    Cardiovascular:  Negative for chest pain, palpitations and leg swelling.   Gastrointestinal:  Positive for nausea. Negative for abdominal distention, abdominal pain, anal bleeding and blood in stool.   Endocrine: Negative for cold intolerance, heat intolerance, polydipsia and polyphagia.   Genitourinary:  Negative for difficulty urinating, dysuria, flank pain and hematuria.   Musculoskeletal:  Negative for arthralgias, back pain, joint swelling, myalgias, neck pain and neck stiffness.        Positive for breast mass. See HPI   Skin:  Negative for color change, pallor and wound.   Allergic/Immunologic: Negative for environmental allergies, food allergies and immunocompromised state.   Neurological:  Negative for dizziness, seizures, facial asymmetry, speech difficulty, light-headedness, numbness and headaches.   Hematological:  Negative for adenopathy. Does not bruise/bleed easily.   Psychiatric/Behavioral:  Negative for agitation, behavioral problems, confusion, decreased concentration and sleep disturbance.          Physical Exam  Vitals and nursing note reviewed.   Constitutional:       General: She is not in acute distress.     Appearance: Normal appearance. She is not ill-appearing.   HENT:      Head: Normocephalic and atraumatic.      Nose: No congestion or rhinorrhea.   Eyes:      General: No scleral icterus.     Extraocular Movements: Extraocular movements intact.      Pupils: Pupils are equal, round, and reactive to light.   Cardiovascular:      Rate and Rhythm: Normal rate and regular rhythm.      Pulses: Normal pulses.      Heart sounds: Normal heart sounds. No murmur heard.     No gallop.   Pulmonary:      Effort: Pulmonary effort is normal. No respiratory distress.      Breath sounds: Normal  breath sounds. No stridor. No wheezing or rhonchi.   Abdominal:      General: Bowel sounds are normal. There is no distension.      Palpations: There is no mass.      Tenderness: There is no abdominal tenderness. There is no guarding.   Musculoskeletal:         General: No swelling, tenderness, deformity or signs of injury. Normal range of motion.      Cervical back: Normal range of motion and neck supple. No rigidity. No muscular tenderness.      Right lower leg: No edema.      Left lower leg: No edema.   Skin:     General: Skin is warm.      Coloration: Skin is not jaundiced or pale.      Findings: No bruising or lesion.      Comments: Status post left breast lumpectomy.  Healing well   Neurological:      General: No focal deficit present.      Mental Status: She is alert and oriented to person, place, and time.      Cranial Nerves: No cranial nerve deficit.      Sensory: No sensory deficit.      Motor: No weakness.      Gait: Gait normal.   Psychiatric:         Mood and Affect: Mood normal.         Behavior: Behavior normal.         Thought Content: Thought content normal.                         Vitals:    11/29/23 0836   BP: 126/77   Pulse: 81   Resp: 17     Body surface area is 1.73 meters squared.    Assessment/Plan:      ECOG 1    Infiltrating ductal carcinoma arising from the left breast in female: Stage IB  == Triple negative.  Status post left breast lumpectomy and sentinel lymph node evaluation  ==  pT1c  pN0(sn)  M-n/a  G3.  Size of invasive component is more than 1 cm (13 mm).  Based on her triple negative status and young age my recommendation would be for adjuvant chemotherapy with dose dense AC x4 followed by weekly paclitaxel for 12 cycles.  Post lumpectomy and after completion of chemotherapy patient will be followed by radiation oncology for evaluation of radiation treatment. Will need genetic testing.  ==11/3/22:  Started dose dense AC followed by Abraxane (due to taxol reaction) completed  4/13/23. Completed xrt with Dr Boyle on 5/30/23.   ==11/29/23: No clinical evidence of recurrence on MMG. Annual MMG recommended for the rest of her life. Next due 08/2024. Referral placed for port removal      Plan:  Port Removal (referral placed)  RTC in 3 months with: CBC with diff, CMP prior      Total time spent in counseling and discussion about further management options including relevant lab work, treatment,  prognosis, medications and intended side effects was more than 60 minutes. More than 50% of the time was spent on counseling and coordination of care.  I spent a total of 60 minutes on the day of the visit.This includes face to face time and non-face to face time preparing to see the patient (eg, review of tests), Obtaining and/or reviewing separately obtained history, Documenting clinical information in the electronic or other health record, Independently interpreting resultsand communicating results to the patient/family/caregiver, or Care coordination.

## 2023-12-04 ENCOUNTER — OFFICE VISIT (OUTPATIENT)
Dept: SURGERY | Facility: CLINIC | Age: 48
End: 2023-12-04
Payer: COMMERCIAL

## 2023-12-04 VITALS — BODY MASS INDEX: 29.27 KG/M2 | HEIGHT: 61 IN | WEIGHT: 155 LBS

## 2023-12-04 DIAGNOSIS — C50.919 TRIPLE NEGATIVE BREAST CANCER: ICD-10-CM

## 2023-12-04 DIAGNOSIS — Z85.3 PERSONAL HISTORY OF BREAST CANCER: Primary | ICD-10-CM

## 2023-12-04 PROCEDURE — 99213 OFFICE O/P EST LOW 20 MIN: CPT | Mod: S$GLB,,, | Performed by: SURGERY

## 2023-12-04 PROCEDURE — 3008F BODY MASS INDEX DOCD: CPT | Mod: CPTII,S$GLB,, | Performed by: SURGERY

## 2023-12-04 PROCEDURE — 1160F RVW MEDS BY RX/DR IN RCRD: CPT | Mod: CPTII,S$GLB,, | Performed by: SURGERY

## 2023-12-04 PROCEDURE — 1160F PR REVIEW ALL MEDS BY PRESCRIBER/CLIN PHARMACIST DOCUMENTED: ICD-10-PCS | Mod: CPTII,S$GLB,, | Performed by: SURGERY

## 2023-12-04 PROCEDURE — 3008F PR BODY MASS INDEX (BMI) DOCUMENTED: ICD-10-PCS | Mod: CPTII,S$GLB,, | Performed by: SURGERY

## 2023-12-04 PROCEDURE — 1159F PR MEDICATION LIST DOCUMENTED IN MEDICAL RECORD: ICD-10-PCS | Mod: CPTII,S$GLB,, | Performed by: SURGERY

## 2023-12-04 PROCEDURE — 4010F PR ACE/ARB THEARPY RXD/TAKEN: ICD-10-PCS | Mod: CPTII,S$GLB,, | Performed by: SURGERY

## 2023-12-04 PROCEDURE — 1159F MED LIST DOCD IN RCRD: CPT | Mod: CPTII,S$GLB,, | Performed by: SURGERY

## 2023-12-04 PROCEDURE — 4010F ACE/ARB THERAPY RXD/TAKEN: CPT | Mod: CPTII,S$GLB,, | Performed by: SURGERY

## 2023-12-04 PROCEDURE — 99213 PR OFFICE/OUTPT VISIT, EST, LEVL III, 20-29 MIN: ICD-10-PCS | Mod: S$GLB,,, | Performed by: SURGERY

## 2023-12-04 NOTE — PROGRESS NOTES
History & Physical    SUBJECTIVE:     History of Present Illness:    47-year-old female with personal history of triple negative left breast cancer who has had and completed therapy which included chemotherapy and surgery, she desires port removal.    Chief Complaint   Patient presents with    removal of port         Review of patient's allergies indicates:  Review of patient's allergies indicates:  No Known Allergies    Current Outpatient Medications on File Prior to Visit   Medication Sig Dispense Refill    atorvastatin (LIPITOR) 10 MG tablet Take 10 mg by mouth once daily.      metFORMIN (GLUCOPHAGE) 1000 MG tablet       OZEMPIC 0.25 mg or 0.5 mg (2 mg/3 mL) pen injector INJECT 0.5 MG UNDER THE SKIN ONCE A WEEK      OZEMPIC 0.25 mg or 0.5 mg(2 mg/1.5 mL) pen injector INJECT 0.5 MG UNDER THE SKIN ONCE A WEEK      prochlorperazine (COMPAZINE) 10 MG tablet Take 1 tablet (10 mg total) by mouth 3 (three) times daily. 90 tablet 1    valsartan (DIOVAN) 160 MG tablet Take 160 mg by mouth once daily.      losartan (COZAAR) 50 MG tablet       promethazine (PHENERGAN) 25 MG tablet Take 1 tablet (25 mg total) by mouth every 8 (eight) hours. (Patient not taking: Reported on 12/4/2023) 30 tablet 3     No current facility-administered medications on file prior to visit.       Past Medical History:   Diagnosis Date    Depression     Diabetes mellitus     DM (diabetes mellitus), type 2     Elderly multigravida     Hypertension      Past Surgical History:   Procedure Laterality Date    LEFT OOPHORECTOMY Left 2012    left ovary removed    LUMPECTOMY, BREAST Left     PORTACATH PLACEMENT Right      Family History   Problem Relation Age of Onset    Hypertension Mother     Diabetes Mother     Hyperlipidemia Mother     Hyperthyroidism Mother     Stroke Maternal Uncle     No Known Problems Father     No Known Problems Sister     No Known Problems Brother     No Known Problems Maternal Grandmother     No Known Problems Maternal  Grandfather     No Known Problems Paternal Grandmother     No Known Problems Paternal Grandfather        Social History     Socioeconomic History    Marital status:    Tobacco Use    Smoking status: Never    Smokeless tobacco: Never   Substance and Sexual Activity    Alcohol use: Not Currently    Drug use: Never    Sexual activity: Yes     Partners: Male     Birth control/protection: None     Comment: history of IUD          Review of Systems   Constitutional: Negative.    Respiratory: Negative.     Cardiovascular: Negative.    Gastrointestinal: Negative.    Genitourinary: Negative.    Musculoskeletal: Negative.    Neurological: Negative.    Endo/Heme/Allergies: Negative.    Psychiatric/Behavioral: Negative.         OBJECTIVE:     There were no vitals filed for this visit.              Physical Exam:  Physical Exam  Constitutional:       Appearance: Normal appearance. She is normal weight.   HENT:      Head: Normocephalic.   Eyes:      Pupils: Pupils are equal, round, and reactive to light.   Cardiovascular:      Rate and Rhythm: Normal rate and regular rhythm.   Pulmonary:      Effort: Pulmonary effort is normal.      Breath sounds: Normal breath sounds.   Chest:          Comments: Right anterior wall venous access port noted in subcutaneous tissues  Abdominal:      General: Abdomen is flat. Bowel sounds are normal.      Palpations: Abdomen is soft.   Musculoskeletal:         General: Normal range of motion.      Cervical back: Normal range of motion.   Skin:     General: Skin is warm and dry.   Neurological:      General: No focal deficit present.      Mental Status: She is alert and oriented to person, place, and time.   Psychiatric:         Behavior: Behavior normal.             ASSESSMENT/PLAN:   Personal history of triple negative breast cancer, completion of therapy  PLAN:  Removal right subclavian venous access port will be scheduled for

## 2023-12-07 LAB
ANION GAP SERPL CALC-SCNC: 9 MMOL/L (ref 3–11)
BASOPHILS NFR BLD: 0.3 % (ref 0–3)
BUN SERPL-MCNC: 9 MG/DL (ref 7–18)
BUN/CREAT SERPL: 14.75 RATIO (ref 7–18)
CALCIUM SERPL-MCNC: 9.3 MG/DL (ref 8.8–10.5)
CHLORIDE SERPL-SCNC: 100 MMOL/L (ref 100–108)
CO2 SERPL-SCNC: 31 MMOL/L (ref 21–32)
CREAT SERPL-MCNC: 0.61 MG/DL (ref 0.55–1.02)
EOSINOPHIL NFR BLD: 2.2 % (ref 1–3)
ERYTHROCYTE [DISTWIDTH] IN BLOOD BY AUTOMATED COUNT: 12.7 % (ref 12.5–18)
GFR ESTIMATION: > 60
GLUCOSE SERPL-MCNC: 113 MG/DL (ref 70–110)
HCG QUALITATIVE: NEGATIVE
HCT VFR BLD AUTO: 37 % (ref 37–47)
HGB BLD-MCNC: 12.8 G/DL (ref 12–16)
LYMPHOCYTES NFR BLD: 13.2 % (ref 25–40)
MCH RBC QN AUTO: 29.2 PG (ref 27–31.2)
MCHC RBC AUTO-ENTMCNC: 34.6 G/DL (ref 31.8–35.4)
MCV RBC AUTO: 84.3 FL (ref 80–97)
MONOCYTES NFR BLD: 7.1 % (ref 1–15)
NEUTROPHILS # BLD AUTO: 7.3 10*3/UL (ref 1.8–7.7)
NEUTROPHILS NFR BLD: 77 % (ref 37–80)
NUCLEATED RED BLOOD CELLS: 0 %
PLATELETS: 272 10*3/UL (ref 142–424)
POTASSIUM SERPL-SCNC: 3.6 MMOL/L (ref 3.6–5.2)
RBC # BLD AUTO: 4.39 10*6/UL (ref 4.2–5.4)
SODIUM BLD-SCNC: 140 MMOL/L (ref 135–145)
WBC # BLD: 9.5 10*3/UL (ref 4.6–10.2)

## 2023-12-12 ENCOUNTER — OUTSIDE PLACE OF SERVICE (OUTPATIENT)
Dept: SURGERY | Facility: CLINIC | Age: 48
End: 2023-12-12

## 2023-12-12 LAB — GLUCOSE SERPL-MCNC: 92 MG/DL (ref 70–105)

## 2023-12-12 PROCEDURE — 36590 REMOVAL TUNNELED CV CATH: CPT | Mod: RT,,, | Performed by: SURGERY

## 2023-12-12 PROCEDURE — 36590 PR REMOVAL TUNNELED CV CATH W SUBQ PORT OR PUMP: ICD-10-PCS | Mod: RT,,, | Performed by: SURGERY

## 2023-12-18 ENCOUNTER — OFFICE VISIT (OUTPATIENT)
Dept: SURGERY | Facility: CLINIC | Age: 48
End: 2023-12-18
Payer: COMMERCIAL

## 2023-12-18 DIAGNOSIS — Z98.890 POST-OPERATIVE STATE: Primary | ICD-10-CM

## 2023-12-18 PROCEDURE — 1160F RVW MEDS BY RX/DR IN RCRD: CPT | Mod: CPTII,S$GLB,, | Performed by: SURGERY

## 2023-12-18 PROCEDURE — 99024 PR POST-OP FOLLOW-UP VISIT: ICD-10-PCS | Mod: S$GLB,,, | Performed by: SURGERY

## 2023-12-18 PROCEDURE — 1160F PR REVIEW ALL MEDS BY PRESCRIBER/CLIN PHARMACIST DOCUMENTED: ICD-10-PCS | Mod: CPTII,S$GLB,, | Performed by: SURGERY

## 2023-12-18 PROCEDURE — 1159F PR MEDICATION LIST DOCUMENTED IN MEDICAL RECORD: ICD-10-PCS | Mod: CPTII,S$GLB,, | Performed by: SURGERY

## 2023-12-18 PROCEDURE — 4010F ACE/ARB THERAPY RXD/TAKEN: CPT | Mod: CPTII,S$GLB,, | Performed by: SURGERY

## 2023-12-18 PROCEDURE — 1159F MED LIST DOCD IN RCRD: CPT | Mod: CPTII,S$GLB,, | Performed by: SURGERY

## 2023-12-18 PROCEDURE — 99024 POSTOP FOLLOW-UP VISIT: CPT | Mod: S$GLB,,, | Performed by: SURGERY

## 2023-12-18 PROCEDURE — 4010F PR ACE/ARB THEARPY RXD/TAKEN: ICD-10-PCS | Mod: CPTII,S$GLB,, | Performed by: SURGERY

## 2023-12-18 NOTE — PROGRESS NOTES
HPI:  Postop revisit status post excision l right subclavian venous access port    PHYSICAL EXAM:  Some breakout and rash of the skin noted from the adhesive of the bandage.  The incision itself is clean and dry subcuticular suture removed  ASSESSMENT:    Stable  PLAN:      Revisit as needed.  Advised to no longer cover with bandage.

## 2024-02-27 DIAGNOSIS — C50.919 TRIPLE NEGATIVE BREAST CANCER: Primary | ICD-10-CM

## 2024-02-27 LAB
ALBUMIN SERPL BCP-MCNC: 4 G/DL (ref 3.4–5)
ALBUMIN/GLOBULIN RATIO: 1.25 RATIO (ref 1.1–1.8)
ALP SERPL-CCNC: 60 U/L (ref 46–116)
ALT SERPL W P-5'-P-CCNC: 15 U/L (ref 12–78)
ANION GAP SERPL CALC-SCNC: 8 MMOL/L (ref 3–11)
AST SERPL-CCNC: 13 U/L (ref 15–37)
BASOPHILS NFR BLD: 0.6 % (ref 0–3)
BILIRUB SERPL-MCNC: 0.4 MG/DL (ref 0–1)
BUN SERPL-MCNC: 12 MG/DL (ref 7–18)
BUN/CREAT SERPL: 20.33 RATIO (ref 7–18)
CALCIUM SERPL-MCNC: 9 MG/DL (ref 8.8–10.5)
CHLORIDE SERPL-SCNC: 103 MMOL/L (ref 100–108)
CO2 SERPL-SCNC: 28 MMOL/L (ref 21–32)
CREAT SERPL-MCNC: 0.59 MG/DL (ref 0.55–1.02)
EOSINOPHIL NFR BLD: 4.2 % (ref 1–3)
ERYTHROCYTE [DISTWIDTH] IN BLOOD BY AUTOMATED COUNT: 12.8 % (ref 12.5–18)
GFR ESTIMATION: > 60
GLOBULIN: 3.2 G/DL (ref 2.3–3.5)
GLUCOSE SERPL-MCNC: 91 MG/DL (ref 70–110)
HCT VFR BLD AUTO: 35.7 % (ref 37–47)
HGB BLD-MCNC: 12.3 G/DL (ref 12–16)
LYMPHOCYTES NFR BLD: 30.1 % (ref 25–40)
MCH RBC QN AUTO: 29.1 PG (ref 27–31.2)
MCHC RBC AUTO-ENTMCNC: 34.5 G/DL (ref 31.8–35.4)
MCV RBC AUTO: 84.4 FL (ref 80–97)
MONOCYTES NFR BLD: 8.7 % (ref 1–15)
NEUTROPHILS # BLD AUTO: 3.04 10*3/UL (ref 1.8–7.7)
NEUTROPHILS NFR BLD: 56 % (ref 37–80)
NUCLEATED RED BLOOD CELLS: 0 %
PLATELETS: 300 10*3/UL (ref 142–424)
POTASSIUM SERPL-SCNC: 3.6 MMOL/L (ref 3.6–5.2)
PROT SERPL-MCNC: 7.2 G/DL (ref 6.4–8.2)
RBC # BLD AUTO: 4.23 10*6/UL (ref 4.2–5.4)
SODIUM BLD-SCNC: 139 MMOL/L (ref 135–145)
WBC # BLD: 5.4 10*3/UL (ref 4.6–10.2)

## 2024-02-29 ENCOUNTER — OFFICE VISIT (OUTPATIENT)
Dept: HEMATOLOGY/ONCOLOGY | Facility: CLINIC | Age: 49
End: 2024-02-29
Payer: COMMERCIAL

## 2024-02-29 VITALS
DIASTOLIC BLOOD PRESSURE: 82 MMHG | BODY MASS INDEX: 30.46 KG/M2 | SYSTOLIC BLOOD PRESSURE: 126 MMHG | HEART RATE: 84 BPM | HEIGHT: 61 IN | WEIGHT: 161.31 LBS | OXYGEN SATURATION: 98 % | RESPIRATION RATE: 18 BRPM

## 2024-02-29 DIAGNOSIS — C50.919 TRIPLE NEGATIVE BREAST CANCER: Primary | ICD-10-CM

## 2024-02-29 PROCEDURE — 1159F MED LIST DOCD IN RCRD: CPT | Mod: CPTII,S$GLB,, | Performed by: INTERNAL MEDICINE

## 2024-02-29 PROCEDURE — 3008F BODY MASS INDEX DOCD: CPT | Mod: CPTII,S$GLB,, | Performed by: INTERNAL MEDICINE

## 2024-02-29 PROCEDURE — 3074F SYST BP LT 130 MM HG: CPT | Mod: CPTII,S$GLB,, | Performed by: INTERNAL MEDICINE

## 2024-02-29 PROCEDURE — 99214 OFFICE O/P EST MOD 30 MIN: CPT | Mod: S$GLB,,, | Performed by: INTERNAL MEDICINE

## 2024-02-29 PROCEDURE — 3079F DIAST BP 80-89 MM HG: CPT | Mod: CPTII,S$GLB,, | Performed by: INTERNAL MEDICINE

## 2024-02-29 NOTE — PROGRESS NOTES
MEDICAL ONCOLOGY FOLLOW UP CONSULTATION NOTE    Patient ID: Tequila Hernandez is a 48 y.o. female.    Chief Complaint:  Breast cancer    HPI:  Patient is a 47-year-old female with past medical history of diabetes mellitus, hypertension with recent diagnosis of triple negative breast cancer for which she underwent left breast partial mastectomy and sentinel lymph node biopsy.  Patient reports recovering well from recent surgery and presents to our clinic today for further evaluation.  Voices no acute complaints.  She also has undergone genetic testing with surgery does of her young age and triple negative status.      Pathology:     10/7/22:  1. LEFT AXILLARY SENTINEL LYMPH NODE, EXCISIONAL BIOPSY:   - BENIGN REACTIVE SENTINEL LYMPH NODE EXAMINED, AND IT IS NEGATIVE FOR   METASTATIC CARCINOMA (0/1).   2. LEFT BREAST, LEFT SIMPLE MASTECTOMY:   - RESIDUAL INFILTRATING DUCTAL CARCINOMA, BOO GRADE 3, SIZE 13 MM, ASSOCIATED WITH PRIOR    BIOPSY SCAR/SEED MARKER, MARGINS UNINVOLVED IN THIS SPECIMEN, SEE TUMOR SUMMARY.   - NO EVIDENCE OF DCIS.   - NONPROLIFERATIVE FIBROCYSTIC CHANGES AND PSEUDOANGIOMATOUS   HYPERPLASIA(PASH) PRESENT      COMPRISING A GROSSLY EVIDENT 8 MM NODULE AS WELL INCIDENTAL SMALL   FIBROADENOMA..   3. BREAST, ADDITIONAL SUPERIOR MARGIN, MARGIN EXCISION:   - BENIGN MAMMARY TISSUE AND ADIPOSE TISSUE, NEGATIVE FOR TUMOR, MARGIN   CLEAR.   4. LEFT BREAST, ADDITIONAL MEDIAL MARGIN, MARGIN EXCISION:   - BENIGN MAMMARY TISSUE AND ADIPOSE TISSUE, NEGATIVE FOR TUMOR, MARGIN   CLEAR.   LEFT  BREAST TUMOR SUMMARY(INCORPORATES PARTS 1 TO 4):   SPECIMEN TYPE AND PROCEDURE:      Simple mastectomy with additional margin   excisions and sentinel node biopsy.   SPECIMEN LATERALITY:         Left.   TUMOR SITE (QUADRANT):         Not specified.   SPECIMEN INTEGRITY:         Intact    HISTOLOGIC TYPE:         Infiltrating ductal carcinoma.   COMBINED HISTOLOGIC GRADE:      [place] Grade 3   BOO HISTOLOGIC  "SCORE:      3,3,,3= total score 9 of 9    SIZE OF INVASIVE COMPONENT:      13 mm   TUMOR FOCALITY:         Unifocal.   TUMOR NECROSIS:         Not identified.   LYMPHOVASCULAR INVASION:      Not identified.   PERINEURAL INVASION:         Not identified   SKIN:               Not applicable (Skin is present and uninvolved.)   NIPPLE:               Not applicable (Nipple is not present.)   SIZE/TYPE/EXTENT INTRADUCTAL CANCER:  Not identified.        EXTENSIVE INTRADUCTAL COMPONENT (EIC): Not applicable (No DCIS).        NECROSIS:            Not applicable.   MICROCALCIFICATION:         Not identified   SURGICAL MARGINS:             INVASIVE:      Negative                IN SITU:      Not applicable.   DISTANCE TO CLOSEST MARGIN:      Invasive carcinoma extends to 3.5 mm of   superior margin.   LYMPH NODES:                 # TOTAL LYMPH NODES EXAMINED:   1  (1 sentinel in part 1)                 # SENTINEL NODES EXAMINED:   1                 # NODES WITH MACROMETASTASIS:   0                 # NODES WITH MICROMETASTASIS:   0    METASTASIS:            Not applicable.   TREATMENT EFFECT:         No known prior therapy.   TNM CODE:            pT1c  pN0(sn)  M-n/a  G3   ANCILLARY STUDIES: BREAST PROGNOSTIC PANEL: Per prior biopsy [id number], the   tumor is ER-, [place]-, HER2 negative ("Triple Negative").   ___________________________________________________________________________    Imaging:   X-Ray Chest PA And Lateral                                RADIOLOGY REPORT        PT NAME: SARAH JALLOH      [facility]     : 1975 F 47             [address]    ACCT: [id number]                                              [facility] Rec #: [id number]                                        [id number]    Patient Location: [place].PRETEST/             Procedure: CHEST 2 VIEWS    REQUISITION #: [id number]      REPORT #: [id number]           DATE OF EXAM: 23    TIME OF EXAM:        EXAM: CHEST 2 " VIEWS        INDICATION: PREOP: PREOP        FINDINGS:    Comparison 2/13/2023.        A right chest port catheter and postsurgical changes in the left chest are   again identified. Cardiac silhouette is at the upper limits of normal. No   pneumothorax or pleural effusion.        No acute cardiopulmonary abnormality.            Signer Name: Jacob Rincon MD    Signed: 12/7/2023 12:58 PM CST    ()        DICTATING PHYSICIAN:  JACOB RINCON MD                   Date Dictated: 12/07/23 1108        Signed By:  JACOB RINCON MD     Date Signed:  12/07/23 1302     CC: JEWELS NERI MD ; JEWELS NERI MD      ADMITTING PHYSICIAN:                                                                                                    ORDERING PHY: JEWELS NERI MD                                                                                                                                                      ATTENDING PHY: JEWELS NERI MD    Patient Status:  PRE SDC    Admit Service Date: 12/05/23            Past Medical History:   Diagnosis Date    Depression     Diabetes mellitus     DM (diabetes mellitus), type 2     Elderly multigravida     Hypertension      Family History   Problem Relation Age of Onset    Hypertension Mother     Diabetes Mother     Hyperlipidemia Mother     Hyperthyroidism Mother     Stroke Maternal Uncle     No Known Problems Father     No Known Problems Sister     No Known Problems Brother     No Known Problems Maternal Grandmother     No Known Problems Maternal Grandfather     No Known Problems Paternal Grandmother     No Known Problems Paternal Grandfather      Social History     Socioeconomic History    Marital status:    Tobacco Use    Smoking status: Never    Smokeless tobacco: Never   Substance and Sexual Activity    Alcohol use: Not Currently    Drug use: Never    Sexual activity: Yes     Partners: Male     Birth control/protection: None     Comment: history of IUD     Past  Surgical History:   Procedure Laterality Date    LEFT OOPHORECTOMY Left 2012    left ovary removed    LUMPECTOMY, BREAST Left     PORTACATH PLACEMENT Right          Review of systems:  Review of Systems   Constitutional:  Negative for activity change, appetite change, chills, diaphoresis, fatigue and unexpected weight change.   HENT:  Negative for congestion, facial swelling, hearing loss, mouth sores, trouble swallowing and voice change.    Eyes:  Negative for photophobia, pain, discharge and itching.   Respiratory:  Negative for apnea, cough, choking, chest tightness and shortness of breath.    Cardiovascular:  Negative for chest pain, palpitations and leg swelling.   Gastrointestinal:  Positive for nausea. Negative for abdominal distention, abdominal pain, anal bleeding and blood in stool.   Endocrine: Negative for cold intolerance, heat intolerance, polydipsia and polyphagia.   Genitourinary:  Negative for difficulty urinating, dysuria, flank pain and hematuria.   Musculoskeletal:  Negative for arthralgias, back pain, joint swelling, myalgias, neck pain and neck stiffness.        Positive for breast mass. See HPI   Skin:  Negative for color change, pallor and wound.   Allergic/Immunologic: Negative for environmental allergies, food allergies and immunocompromised state.   Neurological:  Negative for dizziness, seizures, facial asymmetry, speech difficulty, light-headedness, numbness and headaches.   Hematological:  Negative for adenopathy. Does not bruise/bleed easily.   Psychiatric/Behavioral:  Negative for agitation, behavioral problems, confusion, decreased concentration and sleep disturbance.          Physical Exam  Vitals and nursing note reviewed.   Constitutional:       General: She is not in acute distress.     Appearance: Normal appearance. She is not ill-appearing.   HENT:      Head: Normocephalic and atraumatic.      Nose: No congestion or rhinorrhea.   Eyes:      General: No scleral icterus.      Extraocular Movements: Extraocular movements intact.      Pupils: Pupils are equal, round, and reactive to light.   Cardiovascular:      Rate and Rhythm: Normal rate and regular rhythm.      Pulses: Normal pulses.      Heart sounds: Normal heart sounds. No murmur heard.     No gallop.   Pulmonary:      Effort: Pulmonary effort is normal. No respiratory distress.      Breath sounds: Normal breath sounds. No stridor. No wheezing or rhonchi.   Abdominal:      General: Bowel sounds are normal. There is no distension.      Palpations: There is no mass.      Tenderness: There is no abdominal tenderness. There is no guarding.   Musculoskeletal:         General: No swelling, tenderness, deformity or signs of injury. Normal range of motion.      Cervical back: Normal range of motion and neck supple. No rigidity. No muscular tenderness.      Right lower leg: No edema.      Left lower leg: No edema.   Skin:     General: Skin is warm.      Coloration: Skin is not jaundiced or pale.      Findings: No bruising or lesion.      Comments: Status post left breast lumpectomy.  Healing well   Neurological:      General: No focal deficit present.      Mental Status: She is alert and oriented to person, place, and time.      Cranial Nerves: No cranial nerve deficit.      Sensory: No sensory deficit.      Motor: No weakness.      Gait: Gait normal.   Psychiatric:         Mood and Affect: Mood normal.         Behavior: Behavior normal.         Thought Content: Thought content normal.                         Vitals:    02/29/24 1014   BP: 126/82   Pulse: 84   Resp: 18       Body surface area is 1.77 meters squared.    Assessment/Plan:      ECOG 1    Infiltrating ductal carcinoma arising from the left breast in female: Stage IB  == Triple negative.  Status post left breast lumpectomy and sentinel lymph node evaluation  ==  pT1c  pN0(sn)  M-n/a  G3.  Size of invasive component is more than 1 cm (13 mm).  Based on her triple negative status  and young age my recommendation would be for adjuvant chemotherapy with dose dense AC x4 followed by weekly paclitaxel for 12 cycles.  Post lumpectomy and after completion of chemotherapy patient will be followed by radiation oncology for evaluation of radiation treatment. Will need genetic testing.  ==11/3/22:  Started dose dense AC followed by Abraxane (due to taxol reaction) completed 4/13/23. Completed xrt with Dr Boyle on 5/30/23.   ==2/29/24: No clinical evidence of recurrence. Annual MMG recommended for the rest of her life. Next due 08/2024.     Plan:    RTC in 3 months with: CBC with diff, CMP prior      Total time spent in counseling and discussion about further management options including relevant lab work, treatment,  prognosis, medications and intended side effects was more than 40 minutes. More than 50% of the time was spent on counseling and coordination of care.  I spent a total of 40 minutes on the day of the visit.This includes face to face time and non-face to face time preparing to see the patient (eg, review of tests), Obtaining and/or reviewing separately obtained history, Documenting clinical information in the electronic or other health record, Independently interpreting resultsand communicating results to the patient/family/caregiver, or Care coordination.

## 2024-05-28 LAB
ALBUMIN SERPL BCP-MCNC: 4 G/DL (ref 3.4–5)
ALBUMIN/GLOBULIN RATIO: 1.29 RATIO (ref 1.1–1.8)
ALP SERPL-CCNC: 61 U/L (ref 46–116)
ALT SERPL W P-5'-P-CCNC: 31 U/L (ref 12–78)
ANION GAP SERPL CALC-SCNC: 11 MMOL/L (ref 3–11)
AST SERPL-CCNC: 17 U/L (ref 15–37)
BASOPHILS NFR BLD: 0.2 % (ref 0–3)
BILIRUB SERPL-MCNC: 0.4 MG/DL (ref 0–1)
BUN SERPL-MCNC: 14 MG/DL (ref 7–18)
BUN/CREAT SERPL: 21.87 RATIO (ref 7–18)
CALCIUM SERPL-MCNC: 9 MG/DL (ref 8.8–10.5)
CHLORIDE SERPL-SCNC: 105 MMOL/L (ref 100–108)
CO2 SERPL-SCNC: 28 MMOL/L (ref 21–32)
CREAT SERPL-MCNC: 0.64 MG/DL (ref 0.55–1.02)
EOSINOPHIL NFR BLD: 3.9 % (ref 1–3)
ERYTHROCYTE [DISTWIDTH] IN BLOOD BY AUTOMATED COUNT: 13.1 % (ref 12.5–18)
GFR ESTIMATION: > 60
GLOBULIN: 3.1 G/DL (ref 2.3–3.5)
GLUCOSE SERPL-MCNC: 103 MG/DL (ref 70–110)
HCT VFR BLD AUTO: 36.7 % (ref 37–47)
HGB BLD-MCNC: 12.4 G/DL (ref 12–16)
LYMPHOCYTES NFR BLD: 29.6 % (ref 25–40)
MCH RBC QN AUTO: 29.4 PG (ref 27–31.2)
MCHC RBC AUTO-ENTMCNC: 33.8 G/DL (ref 31.8–35.4)
MCV RBC AUTO: 87 FL (ref 80–97)
MONOCYTES NFR BLD: 6.9 % (ref 1–15)
NEUTROPHILS # BLD AUTO: 3.18 10*3/UL (ref 1.8–7.7)
NEUTROPHILS NFR BLD: 59.2 % (ref 37–80)
NUCLEATED RED BLOOD CELLS: 0 %
PLATELETS: 309 10*3/UL (ref 142–424)
POTASSIUM SERPL-SCNC: 3.9 MMOL/L (ref 3.6–5.2)
PROT SERPL-MCNC: 7.1 G/DL (ref 6.4–8.2)
RBC # BLD AUTO: 4.22 10*6/UL (ref 4.2–5.4)
SODIUM BLD-SCNC: 144 MMOL/L (ref 135–145)
WBC # BLD: 5.4 10*3/UL (ref 4.6–10.2)

## 2024-05-29 ENCOUNTER — OFFICE VISIT (OUTPATIENT)
Dept: HEMATOLOGY/ONCOLOGY | Facility: CLINIC | Age: 49
End: 2024-05-29
Payer: COMMERCIAL

## 2024-05-29 VITALS
HEART RATE: 78 BPM | HEIGHT: 61 IN | DIASTOLIC BLOOD PRESSURE: 85 MMHG | OXYGEN SATURATION: 98 % | RESPIRATION RATE: 16 BRPM | WEIGHT: 162.88 LBS | SYSTOLIC BLOOD PRESSURE: 122 MMHG | BODY MASS INDEX: 30.75 KG/M2

## 2024-05-29 DIAGNOSIS — Z71.9 ENCOUNTER FOR EDUCATION: Primary | ICD-10-CM

## 2024-05-29 DIAGNOSIS — C50.212 MALIGNANT NEOPLASM OF UPPER-INNER QUADRANT OF LEFT BREAST IN FEMALE, ESTROGEN RECEPTOR POSITIVE: ICD-10-CM

## 2024-05-29 DIAGNOSIS — Z17.0 MALIGNANT NEOPLASM OF UPPER-INNER QUADRANT OF LEFT BREAST IN FEMALE, ESTROGEN RECEPTOR POSITIVE: ICD-10-CM

## 2024-05-29 PROCEDURE — 1160F RVW MEDS BY RX/DR IN RCRD: CPT | Mod: CPTII,S$GLB,, | Performed by: INTERNAL MEDICINE

## 2024-05-29 PROCEDURE — 1159F MED LIST DOCD IN RCRD: CPT | Mod: CPTII,S$GLB,, | Performed by: INTERNAL MEDICINE

## 2024-05-29 PROCEDURE — 3008F BODY MASS INDEX DOCD: CPT | Mod: CPTII,S$GLB,, | Performed by: INTERNAL MEDICINE

## 2024-05-29 PROCEDURE — 3079F DIAST BP 80-89 MM HG: CPT | Mod: CPTII,S$GLB,, | Performed by: INTERNAL MEDICINE

## 2024-05-29 PROCEDURE — 3074F SYST BP LT 130 MM HG: CPT | Mod: CPTII,S$GLB,, | Performed by: INTERNAL MEDICINE

## 2024-05-29 PROCEDURE — 99214 OFFICE O/P EST MOD 30 MIN: CPT | Mod: S$GLB,,, | Performed by: INTERNAL MEDICINE

## 2024-05-29 RX ORDER — MELOXICAM 15 MG/1
15 TABLET ORAL DAILY
COMMUNITY
Start: 2024-05-21

## 2024-05-29 RX ORDER — CLONIDINE HYDROCHLORIDE 0.1 MG/1
0.1 TABLET ORAL
COMMUNITY
Start: 2024-05-22

## 2024-05-29 NOTE — PROGRESS NOTES
MEDICAL ONCOLOGY FOLLOW UP CONSULTATION NOTE    Patient ID: Tequila Hernandez is a 48 y.o. female.    Chief Complaint:  Breast cancer    HPI:  Patient is a 47-year-old female with past medical history of diabetes mellitus, hypertension with recent diagnosis of triple negative breast cancer for which she underwent left breast partial mastectomy and sentinel lymph node biopsy.  Patient reports recovering well from recent surgery and presents to our clinic today for further evaluation.  Voices no acute complaints.  She also has undergone genetic testing with surgery does of her young age and triple negative status.      Pathology:     10/7/22:  1. LEFT AXILLARY SENTINEL LYMPH NODE, EXCISIONAL BIOPSY:   - BENIGN REACTIVE SENTINEL LYMPH NODE EXAMINED, AND IT IS NEGATIVE FOR   METASTATIC CARCINOMA (0/1).   2. LEFT BREAST, LEFT SIMPLE MASTECTOMY:   - RESIDUAL INFILTRATING DUCTAL CARCINOMA, BOO GRADE 3, SIZE 13 MM, ASSOCIATED WITH PRIOR    BIOPSY SCAR/SEED MARKER, MARGINS UNINVOLVED IN THIS SPECIMEN, SEE TUMOR SUMMARY.   - NO EVIDENCE OF DCIS.   - NONPROLIFERATIVE FIBROCYSTIC CHANGES AND PSEUDOANGIOMATOUS   HYPERPLASIA(PASH) PRESENT      COMPRISING A GROSSLY EVIDENT 8 MM NODULE AS WELL INCIDENTAL SMALL   FIBROADENOMA..   3. BREAST, ADDITIONAL SUPERIOR MARGIN, MARGIN EXCISION:   - BENIGN MAMMARY TISSUE AND ADIPOSE TISSUE, NEGATIVE FOR TUMOR, MARGIN   CLEAR.   4. LEFT BREAST, ADDITIONAL MEDIAL MARGIN, MARGIN EXCISION:   - BENIGN MAMMARY TISSUE AND ADIPOSE TISSUE, NEGATIVE FOR TUMOR, MARGIN   CLEAR.   LEFT  BREAST TUMOR SUMMARY(INCORPORATES PARTS 1 TO 4):   SPECIMEN TYPE AND PROCEDURE:      Simple mastectomy with additional margin   excisions and sentinel node biopsy.   SPECIMEN LATERALITY:         Left.   TUMOR SITE (QUADRANT):         Not specified.   SPECIMEN INTEGRITY:         Intact    HISTOLOGIC TYPE:         Infiltrating ductal carcinoma.   COMBINED HISTOLOGIC GRADE:      [place] Grade 3   BOO HISTOLOGIC  "SCORE:      3,3,,3= total score 9 of 9    SIZE OF INVASIVE COMPONENT:      13 mm   TUMOR FOCALITY:         Unifocal.   TUMOR NECROSIS:         Not identified.   LYMPHOVASCULAR INVASION:      Not identified.   PERINEURAL INVASION:         Not identified   SKIN:               Not applicable (Skin is present and uninvolved.)   NIPPLE:               Not applicable (Nipple is not present.)   SIZE/TYPE/EXTENT INTRADUCTAL CANCER:  Not identified.        EXTENSIVE INTRADUCTAL COMPONENT (EIC): Not applicable (No DCIS).        NECROSIS:            Not applicable.   MICROCALCIFICATION:         Not identified   SURGICAL MARGINS:             INVASIVE:      Negative                IN SITU:      Not applicable.   DISTANCE TO CLOSEST MARGIN:      Invasive carcinoma extends to 3.5 mm of   superior margin.   LYMPH NODES:                 # TOTAL LYMPH NODES EXAMINED:   1  (1 sentinel in part 1)                 # SENTINEL NODES EXAMINED:   1                 # NODES WITH MACROMETASTASIS:   0                 # NODES WITH MICROMETASTASIS:   0    METASTASIS:            Not applicable.   TREATMENT EFFECT:         No known prior therapy.   TNM CODE:            pT1c  pN0(sn)  M-n/a  G3   ANCILLARY STUDIES: BREAST PROGNOSTIC PANEL: Per prior biopsy [id number], the   tumor is ER-, [place]-, HER2 negative ("Triple Negative").   ___________________________________________________________________________    Imaging:   X-Ray Chest PA And Lateral                                RADIOLOGY REPORT        PT NAME: SARAH JALLOH      [facility]     : 1975 F 47             [address]    ACCT: [id number]                                              [facility] Rec #: [id number]                                        [id number]    Patient Location: [place].PRETEST/             Procedure: CHEST 2 VIEWS    REQUISITION #: [id number]      REPORT #: [id number]           DATE OF EXAM: 23    TIME OF EXAM:        EXAM: CHEST 2 " VIEWS        INDICATION: PREOP: PREOP        FINDINGS:    Comparison 2/13/2023.        A right chest port catheter and postsurgical changes in the left chest are   again identified. Cardiac silhouette is at the upper limits of normal. No   pneumothorax or pleural effusion.        No acute cardiopulmonary abnormality.            Signer Name: Jacob Rincon MD    Signed: 12/7/2023 12:58 PM CST    ()        DICTATING PHYSICIAN:  JACOB RINCON MD                   Date Dictated: 12/07/23 1108        Signed By:  JACOB RINCON MD     Date Signed:  12/07/23 1302     CC: JEWELS NERI MD ; JEWELS NERI MD      ADMITTING PHYSICIAN:                                                                                                    ORDERING PHY: JEWELS NERI MD                                                                                                                                                      ATTENDING PHY: JEWELS NERI MD    Patient Status:  PRE SDC    Admit Service Date: 12/05/23            Past Medical History:   Diagnosis Date    Depression     Diabetes mellitus     DM (diabetes mellitus), type 2     Elderly multigravida     Hypertension      Family History   Problem Relation Name Age of Onset    Hypertension Mother      Diabetes Mother      Hyperlipidemia Mother      Hyperthyroidism Mother      Stroke Maternal Uncle      No Known Problems Father      No Known Problems Sister      No Known Problems Brother      No Known Problems Maternal Grandmother      No Known Problems Maternal Grandfather      No Known Problems Paternal Grandmother      No Known Problems Paternal Grandfather       Social History     Socioeconomic History    Marital status:    Tobacco Use    Smoking status: Never    Smokeless tobacco: Never   Substance and Sexual Activity    Alcohol use: Not Currently    Drug use: Never    Sexual activity: Yes     Partners: Male     Birth control/protection: None     Comment: history of IUD      Past Surgical History:   Procedure Laterality Date    LEFT OOPHORECTOMY Left 2012    left ovary removed    LUMPECTOMY, BREAST Left     PORTACATH PLACEMENT Right          Review of systems:  Review of Systems   Constitutional:  Negative for activity change, appetite change, chills, diaphoresis, fatigue and unexpected weight change.   HENT:  Negative for congestion, facial swelling, hearing loss, mouth sores, trouble swallowing and voice change.    Eyes:  Negative for photophobia, pain, discharge and itching.   Respiratory:  Negative for apnea, cough, choking, chest tightness and shortness of breath.    Cardiovascular:  Negative for chest pain, palpitations and leg swelling.   Gastrointestinal:  Positive for nausea. Negative for abdominal distention, abdominal pain, anal bleeding and blood in stool.   Endocrine: Negative for cold intolerance, heat intolerance, polydipsia and polyphagia.   Genitourinary:  Negative for difficulty urinating, dysuria, flank pain and hematuria.   Musculoskeletal:  Negative for arthralgias, back pain, joint swelling, myalgias, neck pain and neck stiffness.        Positive for breast mass. See HPI   Skin:  Negative for color change, pallor and wound.   Allergic/Immunologic: Negative for environmental allergies, food allergies and immunocompromised state.   Neurological:  Negative for dizziness, seizures, facial asymmetry, speech difficulty, light-headedness, numbness and headaches.   Hematological:  Negative for adenopathy. Does not bruise/bleed easily.   Psychiatric/Behavioral:  Negative for agitation, behavioral problems, confusion, decreased concentration and sleep disturbance.          Physical Exam  Vitals and nursing note reviewed.   Constitutional:       General: She is not in acute distress.     Appearance: Normal appearance. She is not ill-appearing.   HENT:      Head: Normocephalic and atraumatic.      Nose: No congestion or rhinorrhea.   Eyes:      General: No scleral  icterus.     Extraocular Movements: Extraocular movements intact.      Pupils: Pupils are equal, round, and reactive to light.   Cardiovascular:      Rate and Rhythm: Normal rate and regular rhythm.      Pulses: Normal pulses.      Heart sounds: Normal heart sounds. No murmur heard.     No gallop.   Pulmonary:      Effort: Pulmonary effort is normal. No respiratory distress.      Breath sounds: Normal breath sounds. No stridor. No wheezing or rhonchi.   Abdominal:      General: Bowel sounds are normal. There is no distension.      Palpations: There is no mass.      Tenderness: There is no abdominal tenderness. There is no guarding.   Musculoskeletal:         General: No swelling, tenderness, deformity or signs of injury. Normal range of motion.      Cervical back: Normal range of motion and neck supple. No rigidity. No muscular tenderness.      Right lower leg: No edema.      Left lower leg: No edema.   Skin:     General: Skin is warm.      Coloration: Skin is not jaundiced or pale.      Findings: No bruising or lesion.      Comments: Status post left breast lumpectomy.  Healing well   Neurological:      General: No focal deficit present.      Mental Status: She is alert and oriented to person, place, and time.      Cranial Nerves: No cranial nerve deficit.      Sensory: No sensory deficit.      Motor: No weakness.      Gait: Gait normal.   Psychiatric:         Mood and Affect: Mood normal.         Behavior: Behavior normal.         Thought Content: Thought content normal.                         There were no vitals filed for this visit.      There is no height or weight on file to calculate BSA.    Assessment/Plan:      ECOG 1    Infiltrating ductal carcinoma arising from the left breast in female: Stage IB  == Triple negative.  Status post left breast lumpectomy and sentinel lymph node evaluation  ==  pT1c  pN0(sn)  M-n/a  G3.  Size of invasive component is more than 1 cm (13 mm).  Based on her triple negative  status and young age my recommendation would be for adjuvant chemotherapy with dose dense AC x4 followed by weekly paclitaxel for 12 cycles.  Post lumpectomy and after completion of chemotherapy patient will be followed by radiation oncology for evaluation of radiation treatment. Will need genetic testing.  ==11/3/22:  Started dose dense AC followed by Abraxane (due to taxol reaction) completed 4/13/23. Completed xrt with Dr Boyle on 5/30/23.   ==5/29/24: No clinical evidence of recurrence. Annual MMG recommended for the rest of her life. Next due 08/2024.     Plan:    RTC in 3 months with: CBC with diff, CMP prior      Total time spent in counseling and discussion about further management options including relevant lab work, treatment,  prognosis, medications and intended side effects was more than 30 minutes. More than 50% of the time was spent on counseling and coordination of care.  I spent a total of 30 minutes on the day of the visit.This includes face to face time and non-face to face time preparing to see the patient (eg, review of tests), Obtaining and/or reviewing separately obtained history, Documenting clinical information in the electronic or other health record, Independently interpreting resultsand communicating results to the patient/family/caregiver, or Care coordination.

## 2024-07-10 ENCOUNTER — OFFICE VISIT (OUTPATIENT)
Dept: OBSTETRICS AND GYNECOLOGY | Facility: CLINIC | Age: 49
End: 2024-07-10
Payer: COMMERCIAL

## 2024-07-10 VITALS
HEIGHT: 61 IN | SYSTOLIC BLOOD PRESSURE: 130 MMHG | DIASTOLIC BLOOD PRESSURE: 84 MMHG | BODY MASS INDEX: 30.74 KG/M2 | WEIGHT: 162.81 LBS

## 2024-07-10 DIAGNOSIS — C50.919 TRIPLE NEGATIVE BREAST CANCER: ICD-10-CM

## 2024-07-10 DIAGNOSIS — Z12.4 SCREENING FOR CERVICAL CANCER: Primary | ICD-10-CM

## 2024-07-10 DIAGNOSIS — Z00.00 ENCOUNTER FOR WELLNESS EXAMINATION: ICD-10-CM

## 2024-07-10 PROCEDURE — 3075F SYST BP GE 130 - 139MM HG: CPT | Mod: CPTII,S$GLB,, | Performed by: OBSTETRICS & GYNECOLOGY

## 2024-07-10 PROCEDURE — 3008F BODY MASS INDEX DOCD: CPT | Mod: CPTII,S$GLB,, | Performed by: OBSTETRICS & GYNECOLOGY

## 2024-07-10 PROCEDURE — 99459 PELVIC EXAMINATION: CPT | Mod: S$GLB,,, | Performed by: OBSTETRICS & GYNECOLOGY

## 2024-07-10 PROCEDURE — 3079F DIAST BP 80-89 MM HG: CPT | Mod: CPTII,S$GLB,, | Performed by: OBSTETRICS & GYNECOLOGY

## 2024-07-10 PROCEDURE — 99396 PREV VISIT EST AGE 40-64: CPT | Mod: S$GLB,,, | Performed by: OBSTETRICS & GYNECOLOGY

## 2024-07-10 PROCEDURE — 1159F MED LIST DOCD IN RCRD: CPT | Mod: CPTII,S$GLB,, | Performed by: OBSTETRICS & GYNECOLOGY

## 2024-07-10 RX ORDER — VALSARTAN AND HYDROCHLOROTHIAZIDE 160; 12.5 MG/1; MG/1
1 TABLET, FILM COATED ORAL
COMMUNITY
Start: 2024-05-28

## 2024-07-10 NOTE — PROGRESS NOTES
Subjective     Patient ID: Tequila Hernandez is a 48 y.o. female.    Chief Complaint:  Well Woman      History of Present Illness  HPI  This is a 48 year old female here for her annual exam and has no additional complaints    GYN & OB History  No LMP recorded. Patient has had an implant.   Date of Last Pap: 2023    OB History    Para Term  AB Living   2 2 2     2   SAB IAB Ectopic Multiple Live Births           2      # Outcome Date GA Lbr Travis/2nd Weight Sex Type Anes PTL Lv   2 Term 2018    F Vag-Spont   MAXIMUS   1 Term     M Vag-Spont   MAXIMUS       Review of Systems  Review of Systems   Constitutional:  Negative for fatigue, fever and unexpected weight change.   Respiratory:  Negative for cough and shortness of breath.    Cardiovascular:  Negative for chest pain, palpitations and leg swelling.   Gastrointestinal:  Negative for abdominal pain, bloating, blood in stool, constipation, diarrhea, nausea and vomiting.   Genitourinary:  Negative for decreased libido, dysmenorrhea, dyspareunia, dysuria, frequency, genital sores, hematuria, menorrhagia, menstrual problem, pelvic pain, urgency, vaginal discharge, urinary incontinence and vaginal odor.   Musculoskeletal:  Negative for arthralgias and joint swelling.   Integumentary:  Negative for rash, mole/lesion, breast mass, nipple discharge, breast skin changes and breast tenderness.   Psychiatric/Behavioral: Negative.     Breast: Negative for asymmetry, lump, mass, nipple discharge, skin changes and tenderness         Objective   Physical Exam:   Constitutional: She appears well-developed and well-nourished.      Neck: No thyroid mass and no thyromegaly present.     Pulmonary/Chest: Chest wall is not dull to percussion. She exhibits no mass, no tenderness, no bony tenderness, no laceration, no crepitus, no edema, no deformity, no swelling and no retraction. Right breast exhibits no inverted nipple, no mass, no nipple discharge, no skin change, no  tenderness, presence, no bleeding and no swelling. Left breast exhibits no inverted nipple, no mass, no nipple discharge, no skin change, no tenderness, presence, no bleeding and no swelling.   Left simple mastectomy        Abdominal: Soft. Bowel sounds are normal. There is no abdominal tenderness. Hernia confirmed negative in the right inguinal area and confirmed negative in the left inguinal area.     Genitourinary:    Vagina and uterus normal.      Pelvic exam was performed with patient supine.     Labial bartholins normal.There is no rash, tenderness, lesion or injury on the right labia. There is no rash, tenderness, lesion or injury on the left labia. Cervix is normal. Right adnexum displays no mass, no tenderness and no fullness. Left adnexum displays no mass, no tenderness and no fullness. No rectocele, cystocele or prolapse of vaginal walls in the vagina.                Skin: Skin is warm and dry.    Psychiatric: She has a normal mood and affect. Her speech is normal and behavior is normal.    Chaperone present           Assessment and Plan     1. Screening for cervical cancer    2. Encounter for wellness examination    3. Triple negative breast cancer            Plan:  Screening for cervical cancer  -     Liquid-based pap smear, screening    Encounter for wellness examination    Triple negative breast cancer

## 2024-07-12 ENCOUNTER — PATIENT MESSAGE (OUTPATIENT)
Dept: OBSTETRICS AND GYNECOLOGY | Facility: CLINIC | Age: 49
End: 2024-07-12
Payer: COMMERCIAL

## 2024-07-12 LAB — Lab: NORMAL

## 2024-08-28 LAB
ALBUMIN SERPL BCP-MCNC: 4.1 G/DL (ref 3.4–5)
ALBUMIN/GLOBULIN RATIO: 1.32 RATIO (ref 1.1–1.8)
ALP SERPL-CCNC: 69 U/L (ref 46–116)
ALT SERPL W P-5'-P-CCNC: 25 U/L (ref 12–78)
ANION GAP SERPL CALC-SCNC: 9 MMOL/L (ref 3–11)
AST SERPL-CCNC: 15 U/L (ref 15–37)
BASOPHILS NFR BLD: 0.3 % (ref 0–3)
BILIRUB SERPL-MCNC: 0.5 MG/DL (ref 0–1)
BUN SERPL-MCNC: 9 MG/DL (ref 7–18)
BUN/CREAT SERPL: 14.75 RATIO (ref 7–18)
CALCIUM SERPL-MCNC: 9 MG/DL (ref 8.8–10.5)
CHLORIDE SERPL-SCNC: 103 MMOL/L (ref 100–108)
CO2 SERPL-SCNC: 28 MMOL/L (ref 21–32)
CREAT SERPL-MCNC: 0.61 MG/DL (ref 0.55–1.02)
EOSINOPHIL NFR BLD: 2.9 % (ref 1–3)
ERYTHROCYTE [DISTWIDTH] IN BLOOD BY AUTOMATED COUNT: 12.3 % (ref 12.5–18)
GFR ESTIMATION: > 60
GLOBULIN: 3.1 G/DL (ref 2.3–3.5)
GLUCOSE SERPL-MCNC: 105 MG/DL (ref 70–110)
HCT VFR BLD AUTO: 37.5 % (ref 37–47)
HGB BLD-MCNC: 12.9 G/DL (ref 12–16)
LYMPHOCYTES NFR BLD: 29 % (ref 25–40)
MCH RBC QN AUTO: 29.3 PG (ref 27–31.2)
MCHC RBC AUTO-ENTMCNC: 34.4 G/DL (ref 31.8–35.4)
MCV RBC AUTO: 85 FL (ref 80–97)
MONOCYTES NFR BLD: 6.8 % (ref 1–15)
NEUTROPHILS # BLD AUTO: 3.73 10*3/UL (ref 1.8–7.7)
NEUTROPHILS NFR BLD: 60.8 % (ref 37–80)
NUCLEATED RED BLOOD CELLS: 0 %
PLATELETS: 314 10*3/UL (ref 142–424)
POTASSIUM SERPL-SCNC: 3.7 MMOL/L (ref 3.6–5.2)
PROT SERPL-MCNC: 7.2 G/DL (ref 6.4–8.2)
RBC # BLD AUTO: 4.41 10*6/UL (ref 4.2–5.4)
SODIUM BLD-SCNC: 140 MMOL/L (ref 135–145)
WBC # BLD: 6.1 10*3/UL (ref 4.6–10.2)

## 2024-08-29 ENCOUNTER — OFFICE VISIT (OUTPATIENT)
Dept: HEMATOLOGY/ONCOLOGY | Facility: CLINIC | Age: 49
End: 2024-08-29
Payer: COMMERCIAL

## 2024-08-29 VITALS
RESPIRATION RATE: 16 BRPM | DIASTOLIC BLOOD PRESSURE: 88 MMHG | BODY MASS INDEX: 31.34 KG/M2 | HEART RATE: 86 BPM | OXYGEN SATURATION: 96 % | HEIGHT: 61 IN | WEIGHT: 166 LBS | SYSTOLIC BLOOD PRESSURE: 152 MMHG

## 2024-08-29 DIAGNOSIS — C50.919 TRIPLE NEGATIVE BREAST CANCER: Primary | ICD-10-CM

## 2024-08-29 NOTE — PROGRESS NOTES
MEDICAL ONCOLOGY FOLLOW UP CONSULTATION NOTE    Patient ID: Tequila Hernandez is a 48 y.o. female.    Chief Complaint:  Breast cancer    HPI:  Patient is a 47-year-old female with past medical history of diabetes mellitus, hypertension with recent diagnosis of triple negative breast cancer for which she underwent left breast partial mastectomy and sentinel lymph node biopsy.  Patient reports recovering well from recent surgery and presents to our clinic today for further evaluation.  Voices no acute complaints.  She also has undergone genetic testing with surgery does of her young age and triple negative status.      Pathology:     10/7/22:  1. LEFT AXILLARY SENTINEL LYMPH NODE, EXCISIONAL BIOPSY:   - BENIGN REACTIVE SENTINEL LYMPH NODE EXAMINED, AND IT IS NEGATIVE FOR   METASTATIC CARCINOMA (0/1).   2. LEFT BREAST, LEFT SIMPLE MASTECTOMY:   - RESIDUAL INFILTRATING DUCTAL CARCINOMA, BOO GRADE 3, SIZE 13 MM, ASSOCIATED WITH PRIOR    BIOPSY SCAR/SEED MARKER, MARGINS UNINVOLVED IN THIS SPECIMEN, SEE TUMOR SUMMARY.   - NO EVIDENCE OF DCIS.   - NONPROLIFERATIVE FIBROCYSTIC CHANGES AND PSEUDOANGIOMATOUS   HYPERPLASIA(PASH) PRESENT      COMPRISING A GROSSLY EVIDENT 8 MM NODULE AS WELL INCIDENTAL SMALL   FIBROADENOMA..   3. BREAST, ADDITIONAL SUPERIOR MARGIN, MARGIN EXCISION:   - BENIGN MAMMARY TISSUE AND ADIPOSE TISSUE, NEGATIVE FOR TUMOR, MARGIN   CLEAR.   4. LEFT BREAST, ADDITIONAL MEDIAL MARGIN, MARGIN EXCISION:   - BENIGN MAMMARY TISSUE AND ADIPOSE TISSUE, NEGATIVE FOR TUMOR, MARGIN   CLEAR.   LEFT  BREAST TUMOR SUMMARY(INCORPORATES PARTS 1 TO 4):   SPECIMEN TYPE AND PROCEDURE:      Simple mastectomy with additional margin   excisions and sentinel node biopsy.   SPECIMEN LATERALITY:         Left.   TUMOR SITE (QUADRANT):         Not specified.   SPECIMEN INTEGRITY:         Intact    HISTOLOGIC TYPE:         Infiltrating ductal carcinoma.   COMBINED HISTOLOGIC GRADE:      Sheldon Grade 3   BOO HISTOLOGIC  "SCORE:      3,3,,3= total score 9 of 9    SIZE OF INVASIVE COMPONENT:      13 mm   TUMOR FOCALITY:         Unifocal.   TUMOR NECROSIS:         Not identified.   LYMPHOVASCULAR INVASION:      Not identified.   PERINEURAL INVASION:         Not identified   SKIN:               Not applicable (Skin is present and uninvolved.)   NIPPLE:               Not applicable (Nipple is not present.)   SIZE/TYPE/EXTENT INTRADUCTAL CANCER:  Not identified.        EXTENSIVE INTRADUCTAL COMPONENT (EIC): Not applicable (No DCIS).        NECROSIS:            Not applicable.   MICROCALCIFICATION:         Not identified   SURGICAL MARGINS:             INVASIVE:      Negative                IN SITU:      Not applicable.   DISTANCE TO CLOSEST MARGIN:      Invasive carcinoma extends to 3.5 mm of   superior margin.   LYMPH NODES:                 # TOTAL LYMPH NODES EXAMINED:   1  (1 sentinel in part 1)                 # SENTINEL NODES EXAMINED:   1                 # NODES WITH MACROMETASTASIS:   0                 # NODES WITH MICROMETASTASIS:   0    METASTASIS:            Not applicable.   TREATMENT EFFECT:         No known prior therapy.   TNM CODE:            pT1c  pN0(sn)  M-n/a  G3   ANCILLARY STUDIES: BREAST PROGNOSTIC PANEL: Per prior biopsy [id number], the   tumor is ER-, AL-, HER2 negative ("Triple Negative").   ___________________________________________________________________________    Imaging:   [facility] UNKNOWN RAD EAP                                RADIOLOGY REPORT        PT NAME: SARAH JALLOH      [facility]     : 1975 F 48             [address]    ACCT: [id number]                                              [facility] Rec #: [id number]                                        [phone]    Patient Location: [email or web address]/             Procedure: DREW SCREEN JAVAD W CAD ARTURO    REQUISITION #: [id number]      REPORT #: [id number]           DATE OF EXAM: 24    TIME OF EXAM: 1530  "      DREW SCREEN JAVAD W CAD ARTURO, 8/21/2024 2:55 PM CDT        Comparison: Comparison made with priors through 2022        History: Routine screening mammography        Findings:    Standard 2D and 3D CC and MLO views were performed. R2 computer-aided   detection software was utilized in the interpretation of this examination.         The breasts are extremely dense decreasing the sensitivity of mammography.   Benign type calcifications are identified. Prior left lumpectomy noted.        No suspicious mass, architectural distortion or suspicious calcification is    identified.        There has been no suspicious interval change since the previous exam.        IMPRESSION:    No mammographic evidence of malignancy.        Patient should continue routine annual screening mammography.        BI-RADS: 2- Benign finding- negative        BI-RADS classification:              0 - Assessment is incomplete.      1 - Negative mammogram.      2 - Benign finding - negative.      3 - Probably benign finding - short interval followup suggested.      4 - Suspicious abnormality - biopsy should be considered.      5 - Highly suggestive of malignancy - appropriate action should be taken.      6 - Known biopsy - proven malignancy - appropriate action should be taken.        Based on ACR recommendations, your patient's lifetime risk for developing   breast cancer has been discussed with her today. Because your patient's   personal and/or family history warrants further evaluation, your patient was   offered hereditary cancer testing to help determine potential risks. You can   expect results and further communications to come from our nurse navigator   within the next 30 days. As part of this service, the nurse navigator or   medical director will be discussing results and management plan with the   patient directly. Please call our navigator with any questions.            NOTE: This facility uses a reminder system to ensure that all  patients   receive reminder letters and/or direct phone calls for appointments. This   includes reminders for routine screening mammograms, diagnostic mammograms,    and other breast interventions when appropriate. The patient will be placed    in the appropriate reminder system.        DICTATING PHYSICIAN:  SHELIA LIMON MD                   Date Dictated: 08/22/24 0847        Signed By:  SHELIA LIMON MD <Electronically signed by SHELIA LIMON MD   in OV>    Date Signed:  08/22/24 0848     CC: MARIA LUISA OSPINA MD ; MARIA LUISA OSPINA MD      ADMITTING PHYSICIAN:                                                                                                    ORDERING PHY: MARIA LUISA OSPINA MD                                                                                                                                                      ATTENDING PHY: MARIA LUISA OSPINA MD    Patient Status:  REG CLI    Admit Service Date: 08/21/24            Past Medical History:   Diagnosis Date    Depression     Diabetes mellitus     DM (diabetes mellitus), type 2     Elderly multigravida     Hypertension     Triple negative breast cancer 2022    left breast     Family History   Problem Relation Name Age of Onset    No Known Problems Paternal Grandfather      No Known Problems Paternal Grandmother      No Known Problems Maternal Grandmother      No Known Problems Maternal Grandfather      No Known Problems Father      Hypertension Mother      Diabetes Mother      Hyperlipidemia Mother      Hyperthyroidism Mother      No Known Problems Brother      No Known Problems Sister      Stroke Maternal Uncle      Breast cancer Neg Hx      Colon cancer Neg Hx      Ovarian cancer Neg Hx      Uterine cancer Neg Hx      Cervical cancer Neg Hx      Melanoma Neg Hx      Pancreatic cancer Neg Hx      Prostate cancer Neg Hx       Social History     Socioeconomic History    Marital status:    Tobacco Use    Smoking status: Never    Smokeless  tobacco: Never   Substance and Sexual Activity    Alcohol use: Not Currently    Drug use: Never    Sexual activity: Yes     Partners: Male     Birth control/protection: I.U.D.     Comment: history of IUD     Past Surgical History:   Procedure Laterality Date    LEFT OOPHORECTOMY Left 2012    left ovary removed    LUMPECTOMY, BREAST Left 2022    PORTACATH PLACEMENT Right          Review of systems:  Review of Systems   Constitutional:  Negative for activity change, appetite change, chills, diaphoresis, fatigue and unexpected weight change.   HENT:  Negative for congestion, facial swelling, hearing loss, mouth sores, trouble swallowing and voice change.    Eyes:  Negative for photophobia, pain, discharge and itching.   Respiratory:  Negative for apnea, cough, choking, chest tightness and shortness of breath.    Cardiovascular:  Negative for chest pain, palpitations and leg swelling.   Gastrointestinal:  Positive for nausea. Negative for abdominal distention, abdominal pain, anal bleeding and blood in stool.   Endocrine: Negative for cold intolerance, heat intolerance, polydipsia and polyphagia.   Genitourinary:  Negative for difficulty urinating, dysuria, flank pain and hematuria.   Musculoskeletal:  Negative for arthralgias, back pain, joint swelling, myalgias, neck pain and neck stiffness.        Positive for breast mass. See HPI   Skin:  Negative for color change, pallor and wound.   Allergic/Immunologic: Negative for environmental allergies, food allergies and immunocompromised state.   Neurological:  Negative for dizziness, seizures, facial asymmetry, speech difficulty, light-headedness, numbness and headaches.   Hematological:  Negative for adenopathy. Does not bruise/bleed easily.   Psychiatric/Behavioral:  Negative for agitation, behavioral problems, confusion, decreased concentration and sleep disturbance.          Physical Exam  Vitals and nursing note reviewed.   Constitutional:       General: She is not in  acute distress.     Appearance: Normal appearance. She is not ill-appearing.   HENT:      Head: Normocephalic and atraumatic.      Nose: No congestion or rhinorrhea.   Eyes:      General: No scleral icterus.     Extraocular Movements: Extraocular movements intact.      Pupils: Pupils are equal, round, and reactive to light.   Cardiovascular:      Rate and Rhythm: Normal rate and regular rhythm.      Pulses: Normal pulses.      Heart sounds: Normal heart sounds. No murmur heard.     No gallop.   Pulmonary:      Effort: Pulmonary effort is normal. No respiratory distress.      Breath sounds: Normal breath sounds. No stridor. No wheezing or rhonchi.   Abdominal:      General: Bowel sounds are normal. There is no distension.      Palpations: There is no mass.      Tenderness: There is no abdominal tenderness. There is no guarding.   Musculoskeletal:         General: No swelling, tenderness, deformity or signs of injury. Normal range of motion.      Cervical back: Normal range of motion and neck supple. No rigidity. No muscular tenderness.      Right lower leg: No edema.      Left lower leg: No edema.   Skin:     General: Skin is warm.      Coloration: Skin is not jaundiced or pale.      Findings: No bruising or lesion.      Comments: Status post left breast lumpectomy.  Healing well   Neurological:      General: No focal deficit present.      Mental Status: She is alert and oriented to person, place, and time.      Cranial Nerves: No cranial nerve deficit.      Sensory: No sensory deficit.      Motor: No weakness.      Gait: Gait normal.   Psychiatric:         Mood and Affect: Mood normal.         Behavior: Behavior normal.         Thought Content: Thought content normal.                         Vitals:    08/29/24 1042   BP: (!) 152/88   Pulse: 86   Resp: 16         Body surface area is 1.8 meters squared.    Assessment/Plan:      ECOG 1    Infiltrating ductal carcinoma arising from the left breast in female: Stage  IB  == Triple negative.  Status post left breast lumpectomy and sentinel lymph node evaluation  ==  pT1c  pN0(sn)  M-n/a  G3.  Size of invasive component is more than 1 cm (13 mm).  Based on her triple negative status and young age my recommendation would be for adjuvant chemotherapy with dose dense AC x4 followed by weekly paclitaxel for 12 cycles.  Post lumpectomy and after completion of chemotherapy patient will be followed by radiation oncology for evaluation of radiation treatment. Will need genetic testing.  ==11/3/22:  Started dose dense AC followed by Abraxane (due to taxol reaction) completed 4/13/23. Completed xrt with Dr Boyle on 5/30/23.   ==8/29/24:  Continues to do well.  Recent labs reviewed. No clinical evidence of recurrence on evaluation, labs and recent mammogram. Annual MMG recommended for the rest of her life. Next due after 08/22/2025.     Plan:    RTC in 3 months with: CBC with diff, CMP prior      Total time spent in counseling and discussion about further management options including relevant lab work, treatment,  prognosis, medications and intended side effects was more than 30 minutes. More than 50% of the time was spent on counseling and coordination of care.  I spent a total of 30 minutes on the day of the visit.This includes face to face time and non-face to face time preparing to see the patient (eg, review of tests), Obtaining and/or reviewing separately obtained history, Documenting clinical information in the electronic or other health record, Independently interpreting resultsand communicating results to the patient/family/caregiver, or Care coordination.

## 2024-10-22 ENCOUNTER — DOCUMENTATION ONLY (OUTPATIENT)
Dept: OBSTETRICS AND GYNECOLOGY | Facility: CLINIC | Age: 49
End: 2024-10-22
Payer: COMMERCIAL

## 2024-11-26 LAB
ALBUMIN SERPL BCP-MCNC: 4.5 G/DL (ref 3.4–5)
ALBUMIN/GLOBULIN RATIO: 1.18 RATIO (ref 1.1–1.8)
ALP SERPL-CCNC: 75 U/L (ref 46–116)
ALT SERPL W P-5'-P-CCNC: 8 U/L (ref 12–78)
ANION GAP SERPL CALC-SCNC: 6 MMOL/L (ref 3–11)
AST SERPL-CCNC: 13 U/L (ref 15–37)
BASOPHILS NFR BLD: 0.3 % (ref 0–3)
BILIRUB SERPL-MCNC: 0.7 MG/DL (ref 0–1)
BUN SERPL-MCNC: 11 MG/DL (ref 7–18)
BUN/CREAT SERPL: 17.74 RATIO (ref 7–18)
CALCIUM SERPL-MCNC: 9.8 MG/DL (ref 8.8–10.5)
CHLORIDE SERPL-SCNC: 101 MMOL/L (ref 100–108)
CO2 SERPL-SCNC: 31 MMOL/L (ref 21–32)
CREAT SERPL-MCNC: 0.62 MG/DL (ref 0.55–1.02)
EOSINOPHIL NFR BLD: 4.4 % (ref 1–3)
ERYTHROCYTE [DISTWIDTH] IN BLOOD BY AUTOMATED COUNT: 12.7 % (ref 12.5–18)
GFR ESTIMATION: 110
GLOBULIN: 3.8 G/DL (ref 2.3–3.5)
GLUCOSE SERPL-MCNC: 92 MG/DL (ref 70–110)
HCT VFR BLD AUTO: 40.1 % (ref 37–47)
HGB BLD-MCNC: 13.8 G/DL (ref 12–16)
LYMPHOCYTES NFR BLD: 27.8 % (ref 25–40)
MCH RBC QN AUTO: 29.2 PG (ref 27–31.2)
MCHC RBC AUTO-ENTMCNC: 34.4 G/DL (ref 31.8–35.4)
MCV RBC AUTO: 84.8 FL (ref 80–97)
MONOCYTES NFR BLD: 6.8 % (ref 1–15)
NEUTROPHILS # BLD AUTO: 4.09 10*3/UL (ref 1.8–7.7)
NEUTROPHILS NFR BLD: 60.4 % (ref 37–80)
NUCLEATED RED BLOOD CELLS: 0 %
PLATELETS: 331 10*3/UL (ref 142–424)
POTASSIUM SERPL-SCNC: 3.5 MMOL/L (ref 3.6–5.2)
PROT SERPL-MCNC: 8.3 G/DL (ref 6.4–8.2)
RBC # BLD AUTO: 4.73 10*6/UL (ref 4.2–5.4)
SODIUM BLD-SCNC: 138 MMOL/L (ref 135–145)
WBC # BLD: 6.8 10*3/UL (ref 4.6–10.2)

## 2024-12-02 ENCOUNTER — OFFICE VISIT (OUTPATIENT)
Dept: HEMATOLOGY/ONCOLOGY | Facility: CLINIC | Age: 49
End: 2024-12-02
Payer: COMMERCIAL

## 2024-12-02 VITALS
BODY MASS INDEX: 31.38 KG/M2 | HEIGHT: 61 IN | TEMPERATURE: 99 F | OXYGEN SATURATION: 98 % | RESPIRATION RATE: 16 BRPM | HEART RATE: 87 BPM | DIASTOLIC BLOOD PRESSURE: 88 MMHG | WEIGHT: 166.19 LBS | SYSTOLIC BLOOD PRESSURE: 160 MMHG

## 2024-12-02 DIAGNOSIS — Z17.421 TRIPLE NEGATIVE BREAST CANCER: Primary | ICD-10-CM

## 2024-12-02 DIAGNOSIS — C50.919 TRIPLE NEGATIVE BREAST CANCER: Primary | ICD-10-CM

## 2024-12-02 PROCEDURE — 3008F BODY MASS INDEX DOCD: CPT | Mod: CPTII,,, | Performed by: INTERNAL MEDICINE

## 2024-12-02 PROCEDURE — 3077F SYST BP >= 140 MM HG: CPT | Mod: CPTII,,, | Performed by: INTERNAL MEDICINE

## 2024-12-02 PROCEDURE — 1160F RVW MEDS BY RX/DR IN RCRD: CPT | Mod: CPTII,,, | Performed by: INTERNAL MEDICINE

## 2024-12-02 PROCEDURE — 1159F MED LIST DOCD IN RCRD: CPT | Mod: CPTII,,, | Performed by: INTERNAL MEDICINE

## 2024-12-02 PROCEDURE — 3079F DIAST BP 80-89 MM HG: CPT | Mod: CPTII,,, | Performed by: INTERNAL MEDICINE

## 2024-12-02 PROCEDURE — 99214 OFFICE O/P EST MOD 30 MIN: CPT | Mod: S$PBB,,, | Performed by: INTERNAL MEDICINE

## 2024-12-02 NOTE — PROGRESS NOTES
MEDICAL ONCOLOGY FOLLOW UP CONSULTATION NOTE    Patient ID: Tequila Hernandez is a 48 y.o. female.    Chief Complaint:  Breast cancer    HPI:  Patient is a 47-year-old female with past medical history of diabetes mellitus, hypertension with recent diagnosis of triple negative breast cancer for which she underwent left breast partial mastectomy and sentinel lymph node biopsy.  Patient reports recovering well from recent surgery and presents to our clinic today for further evaluation.  Voices no acute complaints.  She also has undergone genetic testing with surgery does of her young age and triple negative status.      Pathology:     10/7/22:  1. LEFT AXILLARY SENTINEL LYMPH NODE, EXCISIONAL BIOPSY:   - BENIGN REACTIVE SENTINEL LYMPH NODE EXAMINED, AND IT IS NEGATIVE FOR   METASTATIC CARCINOMA (0/1).   2. LEFT BREAST, LEFT SIMPLE MASTECTOMY:   - RESIDUAL INFILTRATING DUCTAL CARCINOMA, BOO GRADE 3, SIZE 13 MM, ASSOCIATED WITH PRIOR    BIOPSY SCAR/SEED MARKER, MARGINS UNINVOLVED IN THIS SPECIMEN, SEE TUMOR SUMMARY.   - NO EVIDENCE OF DCIS.   - NONPROLIFERATIVE FIBROCYSTIC CHANGES AND PSEUDOANGIOMATOUS   HYPERPLASIA(PASH) PRESENT      COMPRISING A GROSSLY EVIDENT 8 MM NODULE AS WELL INCIDENTAL SMALL   FIBROADENOMA..   3. BREAST, ADDITIONAL SUPERIOR MARGIN, MARGIN EXCISION:   - BENIGN MAMMARY TISSUE AND ADIPOSE TISSUE, NEGATIVE FOR TUMOR, MARGIN   CLEAR.   4. LEFT BREAST, ADDITIONAL MEDIAL MARGIN, MARGIN EXCISION:   - BENIGN MAMMARY TISSUE AND ADIPOSE TISSUE, NEGATIVE FOR TUMOR, MARGIN   CLEAR.   LEFT  BREAST TUMOR SUMMARY(INCORPORATES PARTS 1 TO 4):   SPECIMEN TYPE AND PROCEDURE:      Simple mastectomy with additional margin   excisions and sentinel node biopsy.   SPECIMEN LATERALITY:         Left.   TUMOR SITE (QUADRANT):         Not specified.   SPECIMEN INTEGRITY:         Intact    HISTOLOGIC TYPE:         Infiltrating ductal carcinoma.   COMBINED HISTOLOGIC GRADE:      [place] Grade 3   BOO HISTOLOGIC  "SCORE:      3,3,,3= total score 9 of 9    SIZE OF INVASIVE COMPONENT:      13 mm   TUMOR FOCALITY:         Unifocal.   TUMOR NECROSIS:         Not identified.   LYMPHOVASCULAR INVASION:      Not identified.   PERINEURAL INVASION:         Not identified   SKIN:               Not applicable (Skin is present and uninvolved.)   NIPPLE:               Not applicable (Nipple is not present.)   SIZE/TYPE/EXTENT INTRADUCTAL CANCER:  Not identified.        EXTENSIVE INTRADUCTAL COMPONENT (EIC): Not applicable (No DCIS).        NECROSIS:            Not applicable.   MICROCALCIFICATION:         Not identified   SURGICAL MARGINS:             INVASIVE:      Negative                IN SITU:      Not applicable.   DISTANCE TO CLOSEST MARGIN:      Invasive carcinoma extends to 3.5 mm of   superior margin.   LYMPH NODES:                 # TOTAL LYMPH NODES EXAMINED:   1  (1 sentinel in part 1)                 # SENTINEL NODES EXAMINED:   1                 # NODES WITH MACROMETASTASIS:   0                 # NODES WITH MICROMETASTASIS:   0    METASTASIS:            Not applicable.   TREATMENT EFFECT:         No known prior therapy.   TNM CODE:            pT1c  pN0(sn)  M-n/a  G3   ANCILLARY STUDIES: BREAST PROGNOSTIC PANEL: Per prior biopsy [id number], the   tumor is ER-, WY-, HER2 negative ("Triple Negative").   ___________________________________________________________________________    Imaging:   [facility] UNKNOWN RAD EAP                                RADIOLOGY REPORT        PT NAME: SARAH JALLOH      [facility]     : 1975 F 48             [address]    ACCT: [id number]                                              [place] Rec #: [id number]                                        [phone]    Patient Location: [email or web address]/             Procedure: DREW SCREEN JAVAD W CAD ARTURO    REQUISITION #: [id number]      REPORT #: [id number]           DATE OF EXAM: 24    TIME OF EXAM: 1530  "      DREW SCREEN JAVAD W CAD ARTURO, 8/21/2024 2:55 PM CDT        Comparison: Comparison made with priors through 2022        History: Routine screening mammography        Findings:    Standard 2D and 3D CC and MLO views were performed. R2 computer-aided   detection software was utilized in the interpretation of this examination.         The breasts are extremely dense decreasing the sensitivity of mammography.   Benign type calcifications are identified. Prior left lumpectomy noted.        No suspicious mass, architectural distortion or suspicious calcification is    identified.        There has been no suspicious interval change since the previous exam.        IMPRESSION:    No mammographic evidence of malignancy.        Patient should continue routine annual screening mammography.        BI-RADS: 2- Benign finding- negative        BI-RADS classification:              0 - Assessment is incomplete.      1 - Negative mammogram.      2 - Benign finding - negative.      3 - Probably benign finding - short interval followup suggested.      4 - Suspicious abnormality - biopsy should be considered.      5 - Highly suggestive of malignancy - appropriate action should be taken.      6 - Known biopsy - proven malignancy - appropriate action should be taken.        Based on ACR recommendations, your patient's lifetime risk for developing   breast cancer has been discussed with her today. Because your patient's   personal and/or family history warrants further evaluation, your patient was   offered hereditary cancer testing to help determine potential risks. You can   expect results and further communications to come from our nurse navigator   within the next 30 days. As part of this service, the nurse navigator or   medical director will be discussing results and management plan with the   patient directly. Please call our navigator with any questions.            NOTE: This facility uses a reminder system to ensure that all  patients   receive reminder letters and/or direct phone calls for appointments. This   includes reminders for routine screening mammograms, diagnostic mammograms,    and other breast interventions when appropriate. The patient will be placed    in the appropriate reminder system.        DICTATING PHYSICIAN:  SHELIA LIMON MD                   Date Dictated: 08/22/24 0847        Signed By:  SHELIA LIMON MD <Electronically signed by SHELIA LIMON MD   in OV>    Date Signed:  08/22/24 0848     CC: MARIA LUISA OSPINA MD ; MARIA LUISA OSPINA MD      ADMITTING PHYSICIAN:                                                                                                    ORDERING PHY: MARIA LUISA OSPINA MD                                                                                                                                                      ATTENDING PHY: MARIA LUISA OSPINA MD    Patient Status:  REG CLI    Admit Service Date: 08/21/24            Past Medical History:   Diagnosis Date    Depression     Diabetes mellitus     DM (diabetes mellitus), type 2     Elderly multigravida     Hypertension     Triple negative breast cancer 2022    left breast     Family History   Problem Relation Name Age of Onset    No Known Problems Paternal Grandfather      No Known Problems Paternal Grandmother      No Known Problems Maternal Grandmother      No Known Problems Maternal Grandfather      No Known Problems Father      Hypertension Mother      Diabetes Mother      Hyperlipidemia Mother      Hyperthyroidism Mother      No Known Problems Brother      No Known Problems Sister      Stroke Maternal Uncle      Breast cancer Neg Hx      Colon cancer Neg Hx      Ovarian cancer Neg Hx      Uterine cancer Neg Hx      Cervical cancer Neg Hx      Melanoma Neg Hx      Pancreatic cancer Neg Hx      Prostate cancer Neg Hx       Social History     Socioeconomic History    Marital status:    Tobacco Use    Smoking status: Never    Smokeless  tobacco: Never   Substance and Sexual Activity    Alcohol use: Not Currently    Drug use: Never    Sexual activity: Yes     Partners: Male     Birth control/protection: I.U.D.     Comment: history of IUD     Past Surgical History:   Procedure Laterality Date    LEFT OOPHORECTOMY Left 2012    left ovary removed    LUMPECTOMY, BREAST Left 2022    PORTACATH PLACEMENT Right          Review of systems:  Review of Systems   Constitutional:  Negative for activity change, appetite change, chills, diaphoresis, fatigue and unexpected weight change.   HENT:  Negative for congestion, facial swelling, hearing loss, mouth sores, trouble swallowing and voice change.    Eyes:  Negative for photophobia, pain, discharge and itching.   Respiratory:  Negative for apnea, cough, choking, chest tightness and shortness of breath.    Cardiovascular:  Negative for chest pain, palpitations and leg swelling.   Gastrointestinal:  Positive for nausea. Negative for abdominal distention, abdominal pain, anal bleeding and blood in stool.   Endocrine: Negative for cold intolerance, heat intolerance, polydipsia and polyphagia.   Genitourinary:  Negative for difficulty urinating, dysuria, flank pain and hematuria.   Musculoskeletal:  Negative for arthralgias, back pain, joint swelling, myalgias, neck pain and neck stiffness.        Positive for breast mass. See HPI   Skin:  Negative for color change, pallor and wound.   Allergic/Immunologic: Negative for environmental allergies, food allergies and immunocompromised state.   Neurological:  Negative for dizziness, seizures, facial asymmetry, speech difficulty, light-headedness, numbness and headaches.   Hematological:  Negative for adenopathy. Does not bruise/bleed easily.   Psychiatric/Behavioral:  Negative for agitation, behavioral problems, confusion, decreased concentration and sleep disturbance.          Physical Exam  Vitals and nursing note reviewed.   Constitutional:       General: She is not in  acute distress.     Appearance: Normal appearance. She is not ill-appearing.   HENT:      Head: Normocephalic and atraumatic.      Nose: No congestion or rhinorrhea.   Eyes:      General: No scleral icterus.     Extraocular Movements: Extraocular movements intact.      Pupils: Pupils are equal, round, and reactive to light.   Cardiovascular:      Rate and Rhythm: Normal rate and regular rhythm.      Pulses: Normal pulses.      Heart sounds: Normal heart sounds. No murmur heard.     No gallop.   Pulmonary:      Effort: Pulmonary effort is normal. No respiratory distress.      Breath sounds: Normal breath sounds. No stridor. No wheezing or rhonchi.   Abdominal:      General: Bowel sounds are normal. There is no distension.      Palpations: There is no mass.      Tenderness: There is no abdominal tenderness. There is no guarding.   Musculoskeletal:         General: No swelling, tenderness, deformity or signs of injury. Normal range of motion.      Cervical back: Normal range of motion and neck supple. No rigidity. No muscular tenderness.      Right lower leg: No edema.      Left lower leg: No edema.   Skin:     General: Skin is warm.      Coloration: Skin is not jaundiced or pale.      Findings: No bruising or lesion.      Comments: Status post left breast lumpectomy.  Healing well   Neurological:      General: No focal deficit present.      Mental Status: She is alert and oriented to person, place, and time.      Cranial Nerves: No cranial nerve deficit.      Sensory: No sensory deficit.      Motor: No weakness.      Gait: Gait normal.   Psychiatric:         Mood and Affect: Mood normal.         Behavior: Behavior normal.         Thought Content: Thought content normal.                         There were no vitals filed for this visit.        There is no height or weight on file to calculate BSA.    Assessment/Plan:      ECOG 1    Infiltrating ductal carcinoma arising from the left breast in female: Stage IB  ==  Triple negative.  Status post left breast lumpectomy and sentinel lymph node evaluation  ==  pT1c  pN0(sn)  M-n/a  G3.  Size of invasive component is more than 1 cm (13 mm).  Based on her triple negative status and young age my recommendation would be for adjuvant chemotherapy with dose dense AC x4 followed by weekly paclitaxel for 12 cycles.  Post lumpectomy and after completion of chemotherapy patient will be followed by radiation oncology for evaluation of radiation treatment. Will need genetic testing.  ==11/3/22:  Started dose dense AC followed by Abraxane (due to taxol reaction) completed 4/13/23. Completed xrt with Dr Boyle on 5/30/23.   ==12/2/24:  Continues to do well.  Recent labs reviewed. No clinical evidence of recurrence on evaluation and last mammogram. Annual MMG recommended for the rest of her life. Next due after 08/22/2025.     Plan:    RTC in 3 months with: CBC with diff, CMP prior      Total time spent in counseling and discussion about further management options including relevant lab work, treatment,  prognosis, medications and intended side effects was more than 30 minutes. More than 50% of the time was spent on counseling and coordination of care.  I spent a total of 30 minutes on the day of the visit.This includes face to face time and non-face to face time preparing to see the patient (eg, review of tests), Obtaining and/or reviewing separately obtained history, Documenting clinical information in the electronic or other health record, Independently interpreting resultsand communicating results to the patient/family/caregiver, or Care coordination.

## 2025-02-28 LAB
ALBUMIN SERPL BCP-MCNC: 4.1 G/DL (ref 3.4–5)
ALBUMIN/GLOBULIN RATIO: 1.32 RATIO (ref 1.1–1.8)
ALP SERPL-CCNC: 69 U/L (ref 46–116)
ALT SERPL W P-5'-P-CCNC: 27 U/L (ref 12–78)
ANION GAP SERPL CALC-SCNC: 12 MMOL/L (ref 3–11)
AST SERPL-CCNC: 18 U/L (ref 15–37)
BASOPHILS NFR BLD: 0.4 % (ref 0–3)
BILIRUB SERPL-MCNC: 0.5 MG/DL (ref 0–1)
BUN SERPL-MCNC: 9 MG/DL (ref 7–18)
BUN/CREAT SERPL: 17.64 RATIO (ref 7–18)
CALCIUM SERPL-MCNC: 9.4 MG/DL (ref 8.8–10.5)
CHLORIDE SERPL-SCNC: 104 MMOL/L (ref 100–108)
CO2 SERPL-SCNC: 27 MMOL/L (ref 21–32)
CREAT SERPL-MCNC: 0.51 MG/DL (ref 0.55–1.02)
EOSINOPHIL NFR BLD: 4 % (ref 1–3)
ERYTHROCYTE [DISTWIDTH] IN BLOOD BY AUTOMATED COUNT: 12.6 % (ref 12.5–18)
GFR ESTIMATION: 115
GLOBULIN: 3.1 G/DL (ref 2.3–3.5)
GLUCOSE SERPL-MCNC: 99 MG/DL (ref 70–110)
HCT VFR BLD AUTO: 38.2 % (ref 37–47)
HGB BLD-MCNC: 12.7 G/DL (ref 12–16)
LYMPHOCYTES NFR BLD: 35.2 % (ref 25–40)
MCH RBC QN AUTO: 29 PG (ref 27–31.2)
MCHC RBC AUTO-ENTMCNC: 33.2 G/DL (ref 31.8–35.4)
MCV RBC AUTO: 87.2 FL (ref 80–97)
MONOCYTES NFR BLD: 8.1 % (ref 1–15)
NEUTROPHILS # BLD AUTO: 2.63 10*3/UL (ref 1.8–7.7)
NEUTROPHILS NFR BLD: 51.9 % (ref 37–80)
NUCLEATED RED BLOOD CELLS: 0 %
PLATELETS: 344 10*3/UL (ref 142–424)
POTASSIUM SERPL-SCNC: 3.7 MMOL/L (ref 3.6–5.2)
PROT SERPL-MCNC: 7.2 G/DL (ref 6.4–8.2)
RBC # BLD AUTO: 4.38 10*6/UL (ref 4.2–5.4)
SODIUM BLD-SCNC: 143 MMOL/L (ref 135–145)
WBC # BLD: 5.1 10*3/UL (ref 4.6–10.2)

## 2025-03-03 ENCOUNTER — OFFICE VISIT (OUTPATIENT)
Dept: HEMATOLOGY/ONCOLOGY | Facility: CLINIC | Age: 50
End: 2025-03-03
Payer: COMMERCIAL

## 2025-03-03 VITALS
BODY MASS INDEX: 31.6 KG/M2 | WEIGHT: 167.38 LBS | DIASTOLIC BLOOD PRESSURE: 76 MMHG | OXYGEN SATURATION: 97 % | HEIGHT: 61 IN | HEART RATE: 81 BPM | RESPIRATION RATE: 16 BRPM | SYSTOLIC BLOOD PRESSURE: 122 MMHG

## 2025-03-03 DIAGNOSIS — C50.919 TRIPLE NEGATIVE BREAST CANCER: Primary | ICD-10-CM

## 2025-03-03 DIAGNOSIS — Z17.421 TRIPLE NEGATIVE BREAST CANCER: Primary | ICD-10-CM

## 2025-03-03 PROCEDURE — 3078F DIAST BP <80 MM HG: CPT | Mod: CPTII,,, | Performed by: INTERNAL MEDICINE

## 2025-03-03 PROCEDURE — 99214 OFFICE O/P EST MOD 30 MIN: CPT | Mod: S$PBB,,, | Performed by: INTERNAL MEDICINE

## 2025-03-03 PROCEDURE — 3008F BODY MASS INDEX DOCD: CPT | Mod: CPTII,,, | Performed by: INTERNAL MEDICINE

## 2025-03-03 PROCEDURE — 3074F SYST BP LT 130 MM HG: CPT | Mod: CPTII,,, | Performed by: INTERNAL MEDICINE

## 2025-03-03 PROCEDURE — 1159F MED LIST DOCD IN RCRD: CPT | Mod: CPTII,,, | Performed by: INTERNAL MEDICINE

## 2025-03-03 NOTE — PROGRESS NOTES
MEDICAL ONCOLOGY FOLLOW UP CONSULTATION NOTE    Patient ID: Tequila Hernandez is a 49 y.o. female.    Chief Complaint:  Breast cancer    HPI:  Patient is a 47-year-old female with past medical history of diabetes mellitus, hypertension with recent diagnosis of triple negative breast cancer for which she underwent left breast partial mastectomy and sentinel lymph node biopsy.  Patient reports recovering well from recent surgery and presents to our clinic today for further evaluation.  Voices no acute complaints.  She also has undergone genetic testing with surgery does of her young age and triple negative status.      Pathology:     10/7/22:  1. LEFT AXILLARY SENTINEL LYMPH NODE, EXCISIONAL BIOPSY:   - BENIGN REACTIVE SENTINEL LYMPH NODE EXAMINED, AND IT IS NEGATIVE FOR   METASTATIC CARCINOMA (0/1).   2. LEFT BREAST, LEFT SIMPLE MASTECTOMY:   - RESIDUAL INFILTRATING DUCTAL CARCINOMA, BOO GRADE 3, SIZE 13 MM, ASSOCIATED WITH PRIOR    BIOPSY SCAR/SEED MARKER, MARGINS UNINVOLVED IN THIS SPECIMEN, SEE TUMOR SUMMARY.   - NO EVIDENCE OF DCIS.   - NONPROLIFERATIVE FIBROCYSTIC CHANGES AND PSEUDOANGIOMATOUS   HYPERPLASIA(PASH) PRESENT      COMPRISING A GROSSLY EVIDENT 8 MM NODULE AS WELL INCIDENTAL SMALL   FIBROADENOMA..   3. BREAST, ADDITIONAL SUPERIOR MARGIN, MARGIN EXCISION:   - BENIGN MAMMARY TISSUE AND ADIPOSE TISSUE, NEGATIVE FOR TUMOR, MARGIN   CLEAR.   4. LEFT BREAST, ADDITIONAL MEDIAL MARGIN, MARGIN EXCISION:   - BENIGN MAMMARY TISSUE AND ADIPOSE TISSUE, NEGATIVE FOR TUMOR, MARGIN   CLEAR.   LEFT  BREAST TUMOR SUMMARY(INCORPORATES PARTS 1 TO 4):   SPECIMEN TYPE AND PROCEDURE:      Simple mastectomy with additional margin   excisions and sentinel node biopsy.   SPECIMEN LATERALITY:         Left.   TUMOR SITE (QUADRANT):         Not specified.   SPECIMEN INTEGRITY:         Intact    HISTOLOGIC TYPE:         Infiltrating ductal carcinoma.   COMBINED HISTOLOGIC GRADE:      [place] Grade 3   BOO HISTOLOGIC  "SCORE:      3,3,,3= total score 9 of 9    SIZE OF INVASIVE COMPONENT:      13 mm   TUMOR FOCALITY:         Unifocal.   TUMOR NECROSIS:         Not identified.   LYMPHOVASCULAR INVASION:      Not identified.   PERINEURAL INVASION:         Not identified   SKIN:               Not applicable (Skin is present and uninvolved.)   NIPPLE:               Not applicable (Nipple is not present.)   SIZE/TYPE/EXTENT INTRADUCTAL CANCER:  Not identified.        EXTENSIVE INTRADUCTAL COMPONENT (EIC): Not applicable (No DCIS).        NECROSIS:            Not applicable.   MICROCALCIFICATION:         Not identified   SURGICAL MARGINS:             INVASIVE:      Negative                IN SITU:      Not applicable.   DISTANCE TO CLOSEST MARGIN:      Invasive carcinoma extends to 3.5 mm of   superior margin.   LYMPH NODES:                 # TOTAL LYMPH NODES EXAMINED:   1  (1 sentinel in part 1)                 # SENTINEL NODES EXAMINED:   1                 # NODES WITH MACROMETASTASIS:   0                 # NODES WITH MICROMETASTASIS:   0    METASTASIS:            Not applicable.   TREATMENT EFFECT:         No known prior therapy.   TNM CODE:            pT1c  pN0(sn)  M-n/a  G3   ANCILLARY STUDIES: BREAST PROGNOSTIC PANEL: Per prior biopsy [id number], the   tumor is ER-, HI-, HER2 negative ("Triple Negative").   ___________________________________________________________________________    Imaging:   [facility] UNKNOWN RAD EAP                                RADIOLOGY REPORT        PT NAME: SARAH JALLOH      [facility]     : 1975 F 48             1950 [address]    ACCT: [id number]                                              [facility] Rec #: [id number]                                        [phone]    Patient Location: [email or web address]/             Procedure: DREW SCREEN JAVAD W CAD ARTURO    REQUISITION #: [id number]      REPORT #: [id number]           DATE OF EXAM: 24    TIME OF EXAM: 1530  "      DREW SCREEN JAVAD W CAD ARTURO, 8/21/2024 2:55 PM CDT        Comparison: Comparison made with priors through 2022        History: Routine screening mammography        Findings:    Standard 2D and 3D CC and MLO views were performed. R2 computer-aided   detection software was utilized in the interpretation of this examination.         The breasts are extremely dense decreasing the sensitivity of mammography.   Benign type calcifications are identified. Prior left lumpectomy noted.        No suspicious mass, architectural distortion or suspicious calcification is    identified.        There has been no suspicious interval change since the previous exam.        IMPRESSION:    No mammographic evidence of malignancy.        Patient should continue routine annual screening mammography.        BI-RADS: 2- Benign finding- negative        BI-RADS classification:              0 - Assessment is incomplete.      1 - Negative mammogram.      2 - Benign finding - negative.      3 - Probably benign finding - short interval followup suggested.      4 - Suspicious abnormality - biopsy should be considered.      5 - Highly suggestive of malignancy - appropriate action should be taken.      6 - Known biopsy - proven malignancy - appropriate action should be taken.        Based on ACR recommendations, your patient's lifetime risk for developing   breast cancer has been discussed with her today. Because your patient's   personal and/or family history warrants further evaluation, your patient was   offered hereditary cancer testing to help determine potential risks. You can   expect results and further communications to come from our nurse navigator   within the next 30 days. As part of this service, the nurse navigator or   medical director will be discussing results and management plan with the   patient directly. Please call our navigator with any questions.            NOTE: This facility uses a reminder system to ensure that all  patients   receive reminder letters and/or direct phone calls for appointments. This   includes reminders for routine screening mammograms, diagnostic mammograms,    and other breast interventions when appropriate. The patient will be placed    in the appropriate reminder system.        DICTATING PHYSICIAN:  SHELIA LIMON MD                   Date Dictated: 08/22/24 0847        Signed By:  SHELIA LIMON MD <Electronically signed by SHELIA LIMON MD   in OV>    Date Signed:  08/22/24 0848     CC: MARIA LUISA OSPINA MD ; MARIA LUISA OSPINA MD      ADMITTING PHYSICIAN:                                                                                                    ORDERING PHY: MARIA LUISA OSPINA MD                                                                                                                                                      ATTENDING PHY: MARIA LUISA OSPINA MD    Patient Status:  REG CLI    Admit Service Date: 08/21/24            Past Medical History:   Diagnosis Date    Depression     Diabetes mellitus     DM (diabetes mellitus), type 2     Elderly multigravida     Hypertension     Triple negative breast cancer 2022    left breast     Family History   Problem Relation Name Age of Onset    No Known Problems Paternal Grandfather      No Known Problems Paternal Grandmother      No Known Problems Maternal Grandmother      No Known Problems Maternal Grandfather      No Known Problems Father      Hypertension Mother      Diabetes Mother      Hyperlipidemia Mother      Hyperthyroidism Mother      No Known Problems Brother      No Known Problems Sister      Stroke Maternal Uncle      Breast cancer Neg Hx      Colon cancer Neg Hx      Ovarian cancer Neg Hx      Uterine cancer Neg Hx      Cervical cancer Neg Hx      Melanoma Neg Hx      Pancreatic cancer Neg Hx      Prostate cancer Neg Hx       Social History     Socioeconomic History    Marital status:    Tobacco Use    Smoking status: Never    Smokeless  tobacco: Never   Substance and Sexual Activity    Alcohol use: Not Currently    Drug use: Never    Sexual activity: Yes     Partners: Male     Birth control/protection: I.U.D.     Comment: history of IUD     Past Surgical History:   Procedure Laterality Date    LEFT OOPHORECTOMY Left 2012    left ovary removed    LUMPECTOMY, BREAST Left 2022    PORTACATH PLACEMENT Right          Review of systems:  Review of Systems   Constitutional:  Negative for activity change, appetite change, chills, diaphoresis, fatigue and unexpected weight change.   HENT:  Negative for congestion, facial swelling, hearing loss, mouth sores, trouble swallowing and voice change.    Eyes:  Negative for photophobia, pain, discharge and itching.   Respiratory:  Negative for apnea, cough, choking, chest tightness and shortness of breath.    Cardiovascular:  Negative for chest pain, palpitations and leg swelling.   Gastrointestinal:  Negative for abdominal distention, abdominal pain, anal bleeding, blood in stool and nausea.   Endocrine: Negative for cold intolerance, heat intolerance, polydipsia and polyphagia.   Genitourinary:  Negative for difficulty urinating, dysuria, flank pain and hematuria.   Musculoskeletal:  Negative for arthralgias, back pain, joint swelling, myalgias, neck pain and neck stiffness.        Positive for breast mass. See HPI   Skin:  Negative for color change, pallor and wound.   Allergic/Immunologic: Negative for environmental allergies, food allergies and immunocompromised state.   Neurological:  Negative for dizziness, seizures, facial asymmetry, speech difficulty, light-headedness, numbness and headaches.   Hematological:  Negative for adenopathy. Does not bruise/bleed easily.   Psychiatric/Behavioral:  Negative for agitation, behavioral problems, confusion, decreased concentration and sleep disturbance.          Physical Exam  Vitals and nursing note reviewed.   Constitutional:       General: She is not in acute  distress.     Appearance: Normal appearance. She is not ill-appearing.   HENT:      Head: Normocephalic and atraumatic.      Nose: No congestion or rhinorrhea.   Eyes:      General: No scleral icterus.     Extraocular Movements: Extraocular movements intact.      Pupils: Pupils are equal, round, and reactive to light.   Cardiovascular:      Rate and Rhythm: Normal rate and regular rhythm.      Pulses: Normal pulses.      Heart sounds: Normal heart sounds. No murmur heard.     No gallop.   Pulmonary:      Effort: Pulmonary effort is normal. No respiratory distress.      Breath sounds: Normal breath sounds. No stridor. No wheezing or rhonchi.   Abdominal:      General: Bowel sounds are normal. There is no distension.      Palpations: There is no mass.      Tenderness: There is no abdominal tenderness. There is no guarding.   Musculoskeletal:         General: No swelling, tenderness, deformity or signs of injury. Normal range of motion.      Cervical back: Normal range of motion and neck supple. No rigidity. No muscular tenderness.      Right lower leg: No edema.      Left lower leg: No edema.   Skin:     General: Skin is warm.      Coloration: Skin is not jaundiced or pale.      Findings: No bruising or lesion.      Comments: Status post left breast lumpectomy.  Healing well   Neurological:      General: No focal deficit present.      Mental Status: She is alert and oriented to person, place, and time.      Cranial Nerves: No cranial nerve deficit.      Sensory: No sensory deficit.      Motor: No weakness.      Gait: Gait normal.   Psychiatric:         Mood and Affect: Mood normal.         Behavior: Behavior normal.         Thought Content: Thought content normal.                         Vitals:    03/03/25 0940   BP: 122/76   Pulse: 81   Resp: 16           Body surface area is 1.81 meters squared.    Assessment/Plan:      ECOG 1    Infiltrating ductal carcinoma arising from the left breast in female: Stage IB  ==  Triple negative.  Status post left breast lumpectomy and sentinel lymph node evaluation  ==  pT1c  pN0(sn)  M-n/a  G3.  Size of invasive component is more than 1 cm (13 mm).  Based on her triple negative status and young age my recommendation would be for adjuvant chemotherapy with dose dense AC x4 followed by weekly paclitaxel for 12 cycles.  Post lumpectomy and after completion of chemotherapy patient will be followed by radiation oncology for evaluation of radiation treatment. Will need genetic testing.  ==11/3/22:  Started dose dense AC followed by Abraxane (due to taxol reaction) completed 4/13/23. Completed xrt with Dr Boyle on 5/30/23.   ==3/3/25:  Continues to do well.  Recent labs reviewed. No clinical evidence of recurrence on evaluation and last mammogram. Annual MMG recommended for the rest of her life. Next due after 08/22/2025.     Plan:    RTC in 3 months with: CBC with diff, CMP prior      Total time spent in counseling and discussion about further management options including relevant lab work, treatment,  prognosis, medications and intended side effects was more than 30 minutes. More than 50% of the time was spent on counseling and coordination of care.  I spent a total of 30 minutes on the day of the visit.This includes face to face time and non-face to face time preparing to see the patient (eg, review of tests), Obtaining and/or reviewing separately obtained history, Documenting clinical information in the electronic or other health record, Independently interpreting resultsand communicating results to the patient/family/caregiver, or Care coordination.

## 2025-03-08 NOTE — PROGRESS NOTES
MEDICAL ONCOLOGY FOLLOW UP CONSULTATION NOTE    Patient ID: Tequila Hernandez is a 47 y.o. female.    Chief Complaint:  Breast cancer    HPI:  Patient is a 47-year-old female with past medical history of diabetes mellitus, hypertension with recent diagnosis of triple negative breast cancer for which she underwent left breast partial mastectomy and sentinel lymph node biopsy.  Patient reports recovering well from recent surgery and presents to our clinic today for further evaluation.  Voices no acute complaints.  She also has undergone genetic testing with surgery does of her young age and triple negative status.      Pathology:     10/7/22:  1. LEFT AXILLARY SENTINEL LYMPH NODE, EXCISIONAL BIOPSY:   - BENIGN REACTIVE SENTINEL LYMPH NODE EXAMINED, AND IT IS NEGATIVE FOR   METASTATIC CARCINOMA (0/1).   2. LEFT BREAST, LEFT SIMPLE MASTECTOMY:   - RESIDUAL INFILTRATING DUCTAL CARCINOMA, BOO GRADE 3, SIZE 13 MM, ASSOCIATED WITH PRIOR    BIOPSY SCAR/SEED MARKER, MARGINS UNINVOLVED IN THIS SPECIMEN, SEE TUMOR SUMMARY.   - NO EVIDENCE OF DCIS.   - NONPROLIFERATIVE FIBROCYSTIC CHANGES AND PSEUDOANGIOMATOUS   HYPERPLASIA(PASH) PRESENT      COMPRISING A GROSSLY EVIDENT 8 MM NODULE AS WELL INCIDENTAL SMALL   FIBROADENOMA..   3. BREAST, ADDITIONAL SUPERIOR MARGIN, MARGIN EXCISION:   - BENIGN MAMMARY TISSUE AND ADIPOSE TISSUE, NEGATIVE FOR TUMOR, MARGIN   CLEAR.   4. LEFT BREAST, ADDITIONAL MEDIAL MARGIN, MARGIN EXCISION:   - BENIGN MAMMARY TISSUE AND ADIPOSE TISSUE, NEGATIVE FOR TUMOR, MARGIN   CLEAR.   LEFT  BREAST TUMOR SUMMARY(INCORPORATES PARTS 1 TO 4):   SPECIMEN TYPE AND PROCEDURE:      Simple mastectomy with additional margin   excisions and sentinel node biopsy.   SPECIMEN LATERALITY:         Left.   TUMOR SITE (QUADRANT):         Not specified.   SPECIMEN INTEGRITY:         Intact    HISTOLOGIC TYPE:         Infiltrating ductal carcinoma.   COMBINED HISTOLOGIC GRADE:      [place] Grade 3   BOO HISTOLOGIC  "SCORE:      3,3,,3= total score 9 of 9    SIZE OF INVASIVE COMPONENT:      13 mm   TUMOR FOCALITY:         Unifocal.   TUMOR NECROSIS:         Not identified.   LYMPHOVASCULAR INVASION:      Not identified.   PERINEURAL INVASION:         Not identified   SKIN:               Not applicable (Skin is present and uninvolved.)   NIPPLE:               Not applicable (Nipple is not present.)   SIZE/TYPE/EXTENT INTRADUCTAL CANCER:  Not identified.        EXTENSIVE INTRADUCTAL COMPONENT (EIC): Not applicable (No DCIS).        NECROSIS:            Not applicable.   MICROCALCIFICATION:         Not identified   SURGICAL MARGINS:             INVASIVE:      Negative                IN SITU:      Not applicable.   DISTANCE TO CLOSEST MARGIN:      Invasive carcinoma extends to 3.5 mm of   superior margin.   LYMPH NODES:                 # TOTAL LYMPH NODES EXAMINED:   1  (1 sentinel in part 1)                 # SENTINEL NODES EXAMINED:   1                 # NODES WITH MACROMETASTASIS:   0                 # NODES WITH MICROMETASTASIS:   0    METASTASIS:            Not applicable.   TREATMENT EFFECT:         No known prior therapy.   TNM CODE:            pT1c  pN0(sn)  M-n/a  G3   ANCILLARY STUDIES: BREAST PROGNOSTIC PANEL: Per prior biopsy [id number], the   tumor is ER-, FL-, HER2 negative ("Triple Negative").   ___________________________________________________________________________    Imaging:     Past Medical History:   Diagnosis Date    Depression     Diabetes mellitus     DM (diabetes mellitus), type 2     Elderly multigravida     Hypertension      Family History   Problem Relation Age of Onset    Hypertension Mother     Diabetes Mother     Hyperlipidemia Mother     Hyperthyroidism Mother     Stroke Maternal Uncle     No Known Problems Father     No Known Problems Sister     No Known Problems Brother     No Known Problems Maternal Grandmother     No Known Problems Maternal Grandfather     No Known Problems Paternal Grandmother  "    No Known Problems Paternal Grandfather      Social History     Socioeconomic History    Marital status:    Tobacco Use    Smoking status: Never    Smokeless tobacco: Never   Substance and Sexual Activity    Alcohol use: Not Currently    Drug use: Never    Sexual activity: Yes     Partners: Male     Birth control/protection: None     Comment: history of IUD     Past Surgical History:   Procedure Laterality Date    LEFT OOPHORECTOMY Left 2012    left ovary removed         Review of systems:  Review of Systems   Constitutional:  Negative for activity change, appetite change, chills, diaphoresis, fatigue and unexpected weight change.   HENT:  Negative for congestion, facial swelling, hearing loss, mouth sores, trouble swallowing and voice change.    Eyes:  Negative for photophobia, pain, discharge and itching.   Respiratory:  Negative for apnea, cough, choking, chest tightness and shortness of breath.    Cardiovascular:  Negative for chest pain, palpitations and leg swelling.   Gastrointestinal:  Positive for nausea. Negative for abdominal distention, abdominal pain, anal bleeding and blood in stool.   Endocrine: Negative for cold intolerance, heat intolerance, polydipsia and polyphagia.   Genitourinary:  Negative for difficulty urinating, dysuria, flank pain and hematuria.   Musculoskeletal:  Negative for arthralgias, back pain, joint swelling, myalgias, neck pain and neck stiffness.        Positive for breast mass. See HPI   Skin:  Negative for color change, pallor and wound.   Allergic/Immunologic: Negative for environmental allergies, food allergies and immunocompromised state.   Neurological:  Negative for dizziness, seizures, facial asymmetry, speech difficulty, light-headedness, numbness and headaches.   Hematological:  Negative for adenopathy. Does not bruise/bleed easily.   Psychiatric/Behavioral:  Negative for agitation, behavioral problems, confusion, decreased concentration and sleep disturbance.         Physical Exam  Vitals and nursing note reviewed.   Constitutional:       General: She is not in acute distress.     Appearance: Normal appearance. She is not ill-appearing.   HENT:      Head: Normocephalic and atraumatic.      Nose: No congestion or rhinorrhea.   Eyes:      General: No scleral icterus.     Extraocular Movements: Extraocular movements intact.      Pupils: Pupils are equal, round, and reactive to light.   Cardiovascular:      Rate and Rhythm: Normal rate and regular rhythm.      Pulses: Normal pulses.      Heart sounds: Normal heart sounds. No murmur heard.    No gallop.   Pulmonary:      Effort: Pulmonary effort is normal. No respiratory distress.      Breath sounds: Normal breath sounds. No stridor. No wheezing or rhonchi.   Abdominal:      General: Bowel sounds are normal. There is no distension.      Palpations: There is no mass.      Tenderness: There is no abdominal tenderness. There is no guarding.   Musculoskeletal:         General: No swelling, tenderness, deformity or signs of injury. Normal range of motion.      Cervical back: Normal range of motion and neck supple. No rigidity. No muscular tenderness.      Right lower leg: No edema.      Left lower leg: No edema.   Skin:     General: Skin is warm.      Coloration: Skin is not jaundiced or pale.      Findings: No bruising or lesion.      Comments: Status post left breast lumpectomy.  Healing well   Neurological:      General: No focal deficit present.      Mental Status: She is alert and oriented to person, place, and time.      Cranial Nerves: No cranial nerve deficit.      Sensory: No sensory deficit.      Motor: No weakness.      Gait: Gait normal.   Psychiatric:         Mood and Affect: Mood normal.         Behavior: Behavior normal.         Thought Content: Thought content normal.                       There were no vitals filed for this visit.  There is no height or weight on file to calculate BSA.    Assessment/Plan:      ECOG  1    Infiltrating ductal carcinoma arising from the left breast in female: Stage IB  == Triple negative.  Status post left breast lumpectomy and sentinel lymph node evaluation  ==  pT1c  pN0(sn)  M-n/a  G3.  Size of invasive component is more than 1 cm (13 mm).  Based on her triple negative status and young age my recommendation would be for adjuvant chemotherapy with dose dense AC x4 followed by weekly paclitaxel for 12 cycles.  Post lumpectomy and after completion of chemotherapy patient will be followed by radiation oncology for evaluation of radiation treatment.   == Patient will also need genetic testing and based on documentation from surgery she is agreeable to that.  == 10/19/22:  Patient here today to discuss upcoming chemotherapy, side effect profile, risk versus benefits, and expected outcomes have been discussed today. All questions and concerns answered and consents have been signed. Echo planned for tomorrow, port placement TBD. Plan to start treatment on 11/3/22 as she had lumpectomy 10/4/22.   ==11/3/22:  Status post port placement on Tuesday with Dr. Huerta, review of echo on 10/22/2022 shows EF> 55%, labs reviewed and patient is cleared to begin cycle 1 dose dense AC with Neulasta support as planned for today  ==11/17/22:  Tolerated cycle 1 of ddAC very well with no complaints other than mild nausea x1 day.  Patient was able to take Phenergan without any lingering effects.  Labs reviewed and patient is cleared for for today review of recent genetic testing showing no mutation identified.  ==12/15/22:  Tolerated cycle 3 dose dense AC fairly well with complaints of prolonged nausea which was not relieved with Phenergan and Zofran. Will send out compazine.  Patient is still able to work and is feeling good today.  Labs reviewed and patient is cleared for cycle 4 of 4 AC treatment as planned for today  ==12/29/22: Patient had reaction to Taxol. During first few minutes of infusion she had flushing, ears  felt hot, blood pressure increased to 174/90.  Taxol stopped, benadryl and solumedrol administered, patient was monitored per chemobay nurses. Will hence change her chemotherapy to Abraxane. PA request sent for weekly abraxane 80mg/m2 for 12 cycles  ==01/19/2023:  Pt here today to discuss upcoming chemotherapy, side effect profile, risk versus benefits, and expected outcomes have been discussed today. All questions and concerns answered and consents have been signed. She previously had reaction to Taxol.  Starting Abraxane x 12 weeks.    ==01/26/2023: Patient had some mild myalgias after treatment, otherwise very well tolerated. Nausea improved.  Labs reviewed and patient is cleared for chemotherapy    2. DMII  ==Continue to follow up with pcp  ==Continue Metformin per pcp  ==Will monitor for any neuropathy with Taxol, no baseline neuropathy reported    3. CINV  ==Continue Ondansetron, may alternate with phenergan for breakthrough nausea    4. Mucositis  ==Salt and soda rinse  ==Notify clinic if increase in mucositis and will send out magic mouthwash    Plan:  RTC 2 weeks np visit  Continue weekly chemo and labs          Total time spent in counseling and discussion about further management options including relevant lab work, treatment,  prognosis, medications and intended side effects was more than 60 minutes. More than 50% of the time was spent on counseling and coordination of care.  I spent a total of 60 minutes on the day of the visit.This includes face to face time and non-face to face time preparing to see the patient (eg, review of tests), Obtaining and/or reviewing separately obtained history, Documenting clinical information in the electronic or other health record, Independently interpreting resultsand communicating results to the patient/family/caregiver, or Care coordination.                          Normal for race

## 2025-06-03 ENCOUNTER — OFFICE VISIT (OUTPATIENT)
Dept: HEMATOLOGY/ONCOLOGY | Facility: CLINIC | Age: 50
End: 2025-06-03
Payer: COMMERCIAL

## 2025-06-03 VITALS
HEART RATE: 81 BPM | WEIGHT: 167.5 LBS | SYSTOLIC BLOOD PRESSURE: 130 MMHG | RESPIRATION RATE: 16 BRPM | HEIGHT: 61 IN | OXYGEN SATURATION: 93 % | DIASTOLIC BLOOD PRESSURE: 88 MMHG | BODY MASS INDEX: 31.63 KG/M2

## 2025-06-03 DIAGNOSIS — C50.919 TRIPLE NEGATIVE BREAST CANCER: Primary | ICD-10-CM

## 2025-06-03 DIAGNOSIS — Z17.421 TRIPLE NEGATIVE BREAST CANCER: Primary | ICD-10-CM

## 2025-06-03 PROBLEM — Z17.1 MALIGNANT NEOPLASM OF UPPER-INNER QUADRANT OF LEFT BREAST IN FEMALE, ESTROGEN RECEPTOR NEGATIVE: Status: ACTIVE | Noted: 2022-10-14

## 2025-06-03 PROCEDURE — 3008F BODY MASS INDEX DOCD: CPT | Mod: CPTII,,, | Performed by: NURSE PRACTITIONER

## 2025-06-03 PROCEDURE — 99214 OFFICE O/P EST MOD 30 MIN: CPT | Mod: S$PBB,,, | Performed by: NURSE PRACTITIONER

## 2025-06-03 PROCEDURE — 3079F DIAST BP 80-89 MM HG: CPT | Mod: CPTII,,, | Performed by: NURSE PRACTITIONER

## 2025-06-03 PROCEDURE — 1159F MED LIST DOCD IN RCRD: CPT | Mod: CPTII,,, | Performed by: NURSE PRACTITIONER

## 2025-06-03 PROCEDURE — 3075F SYST BP GE 130 - 139MM HG: CPT | Mod: CPTII,,, | Performed by: NURSE PRACTITIONER

## 2025-07-14 ENCOUNTER — OFFICE VISIT (OUTPATIENT)
Dept: OBSTETRICS AND GYNECOLOGY | Facility: CLINIC | Age: 50
End: 2025-07-14
Payer: COMMERCIAL

## 2025-07-14 VITALS
DIASTOLIC BLOOD PRESSURE: 88 MMHG | BODY MASS INDEX: 31.65 KG/M2 | SYSTOLIC BLOOD PRESSURE: 134 MMHG | HEART RATE: 86 BPM | HEIGHT: 61 IN

## 2025-07-14 DIAGNOSIS — Z00.00 ENCOUNTER FOR WELLNESS EXAMINATION: Primary | ICD-10-CM

## 2025-07-14 PROCEDURE — 1159F MED LIST DOCD IN RCRD: CPT | Mod: CPTII,,, | Performed by: OBSTETRICS & GYNECOLOGY

## 2025-07-14 PROCEDURE — 3079F DIAST BP 80-89 MM HG: CPT | Mod: CPTII,,, | Performed by: OBSTETRICS & GYNECOLOGY

## 2025-07-14 PROCEDURE — 3075F SYST BP GE 130 - 139MM HG: CPT | Mod: CPTII,,, | Performed by: OBSTETRICS & GYNECOLOGY

## 2025-07-14 PROCEDURE — 99396 PREV VISIT EST AGE 40-64: CPT | Mod: S$PBB,,, | Performed by: OBSTETRICS & GYNECOLOGY

## 2025-07-14 PROCEDURE — 3008F BODY MASS INDEX DOCD: CPT | Mod: CPTII,,, | Performed by: OBSTETRICS & GYNECOLOGY

## 2025-07-14 RX ORDER — METFORMIN HYDROCHLORIDE 1000 MG/1
1000 TABLET ORAL 2 TIMES DAILY
COMMUNITY

## 2025-07-14 RX ORDER — DEXTROMETHORPHAN HYDROBROMIDE, GUAIFENESIN, PHENYLEPHRINE HYDROCHLORIDE 17.5; 400; 1 MG/1; MG/1; MG/1
TABLET ORAL
COMMUNITY
Start: 2025-03-05 | End: 2025-07-14

## 2025-07-14 RX ORDER — PROMETHAZINE HYDROCHLORIDE AND DEXTROMETHORPHAN HYDROBROMIDE 6.25; 15 MG/5ML; MG/5ML
SYRUP ORAL
COMMUNITY
Start: 2025-03-05 | End: 2025-07-14

## 2025-07-14 RX ORDER — SEMAGLUTIDE 1.34 MG/ML
INJECTION, SOLUTION SUBCUTANEOUS
COMMUNITY
Start: 2025-05-21

## 2025-07-14 RX ORDER — LANOLIN ALCOHOL/MO/W.PET/CERES
CREAM (GRAM) TOPICAL DAILY
COMMUNITY

## 2025-07-14 RX ORDER — VALSARTAN AND HYDROCHLOROTHIAZIDE 160; 12.5 MG/1; MG/1
1 TABLET, FILM COATED ORAL
COMMUNITY

## 2025-07-14 NOTE — PROGRESS NOTES
Subjective     Patient ID: Tequila Hernandez is a 49 y.o. female.    Chief Complaint:  Well Woman      History of Present Illness  HPI  This is a 49 year old female here for her annual exam and has no additional complaints      GYN & OB History  No LMP recorded. Patient has had an implant.   Date of Last Pap: 2024    OB History    Para Term  AB Living   2 2 2   2   SAB IAB Ectopic Multiple Live Births       2      # Outcome Date GA Lbr Travis/2nd Weight Sex Type Anes PTL Lv   2 Term 2018    F Vag-Spont   MAXIMUS   1 Term     M Vag-Spont   MAXIMUS       Review of Systems  Review of Systems   Constitutional:  Negative for fatigue, fever and unexpected weight change.   Respiratory:  Negative for cough and shortness of breath.    Cardiovascular:  Negative for chest pain, palpitations and leg swelling.   Gastrointestinal:  Negative for abdominal pain, bloating, blood in stool, constipation, diarrhea, nausea and vomiting.   Genitourinary:  Negative for decreased libido, dysmenorrhea, dyspareunia, dysuria, frequency, genital sores, hematuria, menorrhagia, menstrual problem, pelvic pain, urgency, vaginal discharge, urinary incontinence and vaginal odor.   Musculoskeletal:  Negative for arthralgias and joint swelling.   Integumentary:  Negative for rash, mole/lesion, breast mass, nipple discharge, breast skin changes and breast tenderness.   Psychiatric/Behavioral: Negative.     Breast: Negative for asymmetry, lump, mass, nipple discharge, skin changes and tenderness         Objective   Physical Exam:   Constitutional: She appears well-developed and well-nourished.      Neck: No thyroid mass and no thyromegaly present.     Pulmonary/Chest: Chest wall is not dull to percussion. She exhibits no mass, no tenderness, no bony tenderness, no laceration, no crepitus, no edema, no deformity, no swelling and no retraction. Right breast exhibits no inverted nipple, no mass, no nipple discharge, no skin change, no tenderness,  presence, no bleeding and no swelling. Left breast exhibits no inverted nipple, no mass, no nipple discharge, no skin change, no tenderness, presence, no bleeding and no swelling.   Left simple mastectomy        Abdominal: Soft. Bowel sounds are normal. There is no abdominal tenderness. Hernia confirmed negative in the right inguinal area and confirmed negative in the left inguinal area.     Genitourinary:    Vagina and uterus normal.      Pelvic exam was performed with patient supine.     Labial bartholins normal.There is no rash, tenderness, lesion or injury on the right labia. There is no rash, tenderness, lesion or injury on the left labia. Cervix is normal. Right adnexum displays no mass, no tenderness and no fullness. Left adnexum displays no mass, no tenderness and no fullness. No rectocele, cystocele or prolapse of vaginal walls in the vagina. IUD strings visualized.               Skin: Skin is warm and dry.    Psychiatric: She has a normal mood and affect. Her speech is normal and behavior is normal.       Chaperone present        Assessment and Plan     1. Encounter for wellness examination            Plan:  Encounter for wellness examination

## 2025-08-26 ENCOUNTER — PATIENT MESSAGE (OUTPATIENT)
Dept: OBSTETRICS AND GYNECOLOGY | Facility: CLINIC | Age: 50
End: 2025-08-26
Payer: COMMERCIAL

## 2025-09-03 ENCOUNTER — OFFICE VISIT (OUTPATIENT)
Dept: HEMATOLOGY/ONCOLOGY | Facility: CLINIC | Age: 50
End: 2025-09-03
Payer: COMMERCIAL

## 2025-09-03 VITALS
RESPIRATION RATE: 16 BRPM | OXYGEN SATURATION: 98 % | HEIGHT: 61 IN | DIASTOLIC BLOOD PRESSURE: 77 MMHG | SYSTOLIC BLOOD PRESSURE: 113 MMHG | BODY MASS INDEX: 30.75 KG/M2 | WEIGHT: 162.88 LBS | HEART RATE: 86 BPM

## 2025-09-03 DIAGNOSIS — Z17.421 TRIPLE NEGATIVE BREAST CANCER: Primary | ICD-10-CM

## 2025-09-03 DIAGNOSIS — R92.343 EXTREMELY DENSE TISSUE OF BOTH BREASTS ON MAMMOGRAPHY: ICD-10-CM

## 2025-09-03 DIAGNOSIS — C50.919 TRIPLE NEGATIVE BREAST CANCER: Primary | ICD-10-CM

## 2025-09-03 PROCEDURE — 99215 OFFICE O/P EST HI 40 MIN: CPT | Mod: S$GLB,,, | Performed by: NURSE PRACTITIONER

## 2025-09-03 PROCEDURE — 1159F MED LIST DOCD IN RCRD: CPT | Mod: CPTII,S$GLB,, | Performed by: NURSE PRACTITIONER

## 2025-09-03 PROCEDURE — 3078F DIAST BP <80 MM HG: CPT | Mod: CPTII,S$GLB,, | Performed by: NURSE PRACTITIONER

## 2025-09-03 PROCEDURE — 3074F SYST BP LT 130 MM HG: CPT | Mod: CPTII,S$GLB,, | Performed by: NURSE PRACTITIONER

## 2025-09-03 PROCEDURE — 3008F BODY MASS INDEX DOCD: CPT | Mod: CPTII,S$GLB,, | Performed by: NURSE PRACTITIONER
